# Patient Record
Sex: MALE | Race: WHITE | NOT HISPANIC OR LATINO | Employment: UNEMPLOYED | URBAN - METROPOLITAN AREA
[De-identification: names, ages, dates, MRNs, and addresses within clinical notes are randomized per-mention and may not be internally consistent; named-entity substitution may affect disease eponyms.]

---

## 2017-11-17 ENCOUNTER — APPOINTMENT (OUTPATIENT)
Dept: ADMISSIONS | Facility: MEDICAL CENTER | Age: 57
End: 2017-11-17
Attending: SURGERY
Payer: MEDICAID

## 2017-11-17 RX ORDER — ACETAMINOPHEN 500 MG
1000 TABLET ORAL EVERY 6 HOURS PRN
COMMUNITY

## 2017-11-17 RX ORDER — IBUPROFEN 200 MG
400 TABLET ORAL EVERY 4 HOURS PRN
COMMUNITY

## 2017-11-17 NOTE — OR NURSING
" Pre-admit appointment completed. \"Preparing for your procedure\" sheet given to pt along with verbal and written instructions. Pt instructed to continue regularly prescribed medications through the day before surgery. Pt instructed to take the following medications the day of surgery with a sip of water, per anesthesia protocol; tylenol.  "

## 2017-11-20 ENCOUNTER — HOSPITAL ENCOUNTER (OUTPATIENT)
Facility: MEDICAL CENTER | Age: 57
End: 2017-11-20
Attending: SURGERY | Admitting: SURGERY
Payer: MEDICAID

## 2017-11-20 VITALS
BODY MASS INDEX: 24.9 KG/M2 | HEART RATE: 62 BPM | TEMPERATURE: 97.3 F | DIASTOLIC BLOOD PRESSURE: 101 MMHG | RESPIRATION RATE: 18 BRPM | OXYGEN SATURATION: 96 % | SYSTOLIC BLOOD PRESSURE: 138 MMHG | HEIGHT: 74 IN | WEIGHT: 194 LBS

## 2017-11-20 PROCEDURE — 160047 HCHG PACU  - EA ADDL 30 MINS PHASE II: Performed by: SURGERY

## 2017-11-20 PROCEDURE — 502571 HCHG PACK, LAP CHOLE: Performed by: SURGERY

## 2017-11-20 PROCEDURE — 700105 HCHG RX REV CODE 258: Performed by: SURGERY

## 2017-11-20 PROCEDURE — 700111 HCHG RX REV CODE 636 W/ 250 OVERRIDE (IP)

## 2017-11-20 PROCEDURE — 501568 HCHG TROCAR, BLUNTPORT 12MM: Performed by: SURGERY

## 2017-11-20 PROCEDURE — 700101 HCHG RX REV CODE 250

## 2017-11-20 PROCEDURE — C1781 MESH (IMPLANTABLE): HCPCS | Performed by: SURGERY

## 2017-11-20 PROCEDURE — 160009 HCHG ANES TIME/MIN: Performed by: SURGERY

## 2017-11-20 PROCEDURE — 160002 HCHG RECOVERY MINUTES (STAT): Performed by: SURGERY

## 2017-11-20 PROCEDURE — 160048 HCHG OR STATISTICAL LEVEL 1-5: Performed by: SURGERY

## 2017-11-20 PROCEDURE — 501570 HCHG TROCAR, SEPARATOR: Performed by: SURGERY

## 2017-11-20 PROCEDURE — 160039 HCHG SURGERY MINUTES - EA ADDL 1 MIN LEVEL 3: Performed by: SURGERY

## 2017-11-20 PROCEDURE — 160046 HCHG PACU - 1ST 60 MINS PHASE II: Performed by: SURGERY

## 2017-11-20 PROCEDURE — 500048 HCHG BALLOON, TROCAR FOR HERNIA: Performed by: SURGERY

## 2017-11-20 PROCEDURE — 160025 RECOVERY II MINUTES (STATS): Performed by: SURGERY

## 2017-11-20 PROCEDURE — 160028 HCHG SURGERY MINUTES - 1ST 30 MINS LEVEL 3: Performed by: SURGERY

## 2017-11-20 PROCEDURE — 700102 HCHG RX REV CODE 250 W/ 637 OVERRIDE(OP)

## 2017-11-20 PROCEDURE — A9270 NON-COVERED ITEM OR SERVICE: HCPCS

## 2017-11-20 PROCEDURE — 501583 HCHG TROCAR, THRD CAN&SEAL 5X100: Performed by: SURGERY

## 2017-11-20 PROCEDURE — 502691 HCHG STAPLER ABSORBATACK FIXTN: Performed by: SURGERY

## 2017-11-20 PROCEDURE — 501838 HCHG SUTURE GENERAL: Performed by: SURGERY

## 2017-11-20 PROCEDURE — 160035 HCHG PACU - 1ST 60 MINS PHASE I: Performed by: SURGERY

## 2017-11-20 DEVICE — MESH PROGRIP LAPROSCOPIC SELF FIXATING (1/CA): Type: IMPLANTABLE DEVICE | Site: GROIN | Status: FUNCTIONAL

## 2017-11-20 RX ORDER — BUPIVACAINE HYDROCHLORIDE AND EPINEPHRINE 5; 5 MG/ML; UG/ML
INJECTION, SOLUTION EPIDURAL; INTRACAUDAL; PERINEURAL
Status: DISCONTINUED | OUTPATIENT
Start: 2017-11-20 | End: 2017-11-20 | Stop reason: HOSPADM

## 2017-11-20 RX ORDER — DOCUSATE SODIUM 100 MG/1
100 CAPSULE, LIQUID FILLED ORAL 2 TIMES DAILY
Qty: 30 CAP | Refills: 3 | Status: SHIPPED | OUTPATIENT
Start: 2017-11-20

## 2017-11-20 RX ORDER — LIDOCAINE HYDROCHLORIDE 10 MG/ML
INJECTION, SOLUTION INFILTRATION; PERINEURAL
Status: COMPLETED
Start: 2017-11-20 | End: 2017-11-20

## 2017-11-20 RX ORDER — CEFAZOLIN SODIUM 1 G/3ML
INJECTION, POWDER, FOR SOLUTION INTRAMUSCULAR; INTRAVENOUS
Status: DISCONTINUED | OUTPATIENT
Start: 2017-11-20 | End: 2017-11-20 | Stop reason: HOSPADM

## 2017-11-20 RX ORDER — SODIUM CHLORIDE, SODIUM LACTATE, POTASSIUM CHLORIDE, CALCIUM CHLORIDE 600; 310; 30; 20 MG/100ML; MG/100ML; MG/100ML; MG/100ML
INJECTION, SOLUTION INTRAVENOUS
Status: DISCONTINUED | OUTPATIENT
Start: 2017-11-20 | End: 2017-11-20 | Stop reason: HOSPADM

## 2017-11-20 RX ORDER — OXYCODONE HCL 5 MG/5 ML
SOLUTION, ORAL ORAL
Status: COMPLETED
Start: 2017-11-20 | End: 2017-11-20

## 2017-11-20 RX ORDER — HYDROMORPHONE HYDROCHLORIDE 2 MG/1
2-4 TABLET ORAL EVERY 4 HOURS PRN
Qty: 10 TAB | Refills: 0 | Status: SHIPPED | OUTPATIENT
Start: 2017-11-20

## 2017-11-20 RX ORDER — ONDANSETRON 4 MG/1
4 TABLET, FILM COATED ORAL EVERY 4 HOURS PRN
Qty: 20 TAB | Refills: 3 | Status: SHIPPED | OUTPATIENT
Start: 2017-11-20

## 2017-11-20 RX ADMIN — LIDOCAINE HYDROCHLORIDE 0.2 ML: 10 INJECTION, SOLUTION INFILTRATION; PERINEURAL at 14:00

## 2017-11-20 RX ADMIN — SODIUM CHLORIDE, POTASSIUM CHLORIDE, SODIUM LACTATE AND CALCIUM CHLORIDE: 600; 310; 30; 20 INJECTION, SOLUTION INTRAVENOUS at 14:13

## 2017-11-20 RX ADMIN — OXYCODONE HYDROCHLORIDE 10 MG: 5 SOLUTION ORAL at 17:11

## 2017-11-20 RX ADMIN — FENTANYL CITRATE 50 MCG: 50 INJECTION, SOLUTION INTRAMUSCULAR; INTRAVENOUS at 17:25

## 2017-11-20 RX ADMIN — FENTANYL CITRATE 50 MCG: 50 INJECTION, SOLUTION INTRAMUSCULAR; INTRAVENOUS at 17:09

## 2017-11-20 ASSESSMENT — PAIN SCALES - GENERAL
PAINLEVEL_OUTOF10: 0
PAINLEVEL_OUTOF10: 4
PAINLEVEL_OUTOF10: 0
PAINLEVEL_OUTOF10: 5
PAINLEVEL_OUTOF10: 4
PAINLEVEL_OUTOF10: 5

## 2017-11-20 NOTE — OR NURSING
Patient to preop, allergies and NPO status verified, home medications reconciled, belongings secured, verbalizes understanding of pain scale, surgical site verified, IV access established, SCDs in place.

## 2017-11-20 NOTE — PROGRESS NOTES
Discharge Instructions:  Inguinal Hernia Repair     1. DIET: After discharge from the hospital you may resume your normal preoperative diet without restrictions.    2. ACTIVITIES: After discharge from the hospital, you may resume full routine activities. Heavy lifting (over 15 pounds) and strenuous activities will make your incision sore and should be avoided for one month after surgery. Routine activities of daily living such as walking, going up and down stairs are acceptable.    3. DRIVING: You may drive whenever you are no longer taking narcotic pain medications and are able to perform the activities needed to drive safely, i.e. turning, bending, twisting, etc.    4. BATHING: OK to shower starting one day after surgery.  The incision is covered with skin glue which is waterproof.  It will start to peel off in 5-7 days which is normal.  Avoid submerging the incision in water (tub, bath, pool) for at least a week.     5. BOWEL FUNCTION: The combination of pain medication and decreased activity level can cause constipation in otherwise normal patients. If you feel this is occurring, take a laxative (Milk of Magnesia, Ex-Lax, Senokot, Miralax, Magnesium Citrate) until the problem has resolved.    6. PAIN MEDICATION: You will be given a prescription for pain medication at discharge. Please take these as directed. It is advisable to take your medication around the clock for the first 24 to 48 hours. You may continue any non-steroidal anti-inflammatory medications (NSAIDs) such as Advil in the post-operative period. These may and should be taken with your narcotic pain medication. It is important to remember not to take medications on an empty stomach as this may cause nausea. Do not consume alcohol while taking pain medication.  You can put ice packs on the groin(s) if it helps for additional pain relief.  Ice should be applied for 20 minutes at a time, with a 20 minute break before reapplying.    7. WHAT TO EXPECT:  You may develop swelling and bruising at the base of your genitalia and within the scrotum (males) or labias (females).  This is due to bleeding from the operative site tracking down into these areas.  The bleeding typically stops within a few hours after surgery.  The bruising may take several weeks to resolve.      8. CALL IF YOU HAVE: (1) Fevers to more than 101.5F, (2) Unusual chest or leg pain, (3) Drainage or fluid from incision that may be foul smelling, increased tenderness or soreness at the wound or the wound edges are no longer together, redness or swelling at the incision site.  (4) Profound swelling at the incision site or in the genitals.    9. FOLLOW-UP: Call and schedule a follow up appointment in about 2 weeks. Our office number is [phone]    If you have any additional questions at all, please do not hesitate to call the office and speak to my medical assistant, myself, or the physician on call.    Pradeep West MD  [facility]  [address] [place] [address], [address]  [place] [address]

## 2017-11-21 NOTE — OP REPORT
Operative Report     Date:    11/20/2017    Pre-operative Diagnosis:  Reducible bilateral inguinal hernias. Right side is recurrent    Post-operative Diagnosis: same     Procedure:   Laparoscopic Bilateral Inguinal Hernia Repair with Mesh    Surgeon:   Pradeep West M.D.     Assistant:    Stacy ADKINS    Anesthesia:   General plus local 0.5% Marcaine with epinephrine    Blood Loss:   Minimal    Specimen:   None    Findings:   small indirect hernias bilaterally      Laparoscopic Progrip mesh for the repair    Wound classification: Clean    Disposition:   PACU in stable condition  ---------------------------------------------------------    History:  57 y.o. male evaluated in the clinic and found to have a reducible bilateral inguinal hernias that were causing him frequent pain.  I had an extensive conversation with the patient regarding the recommendation for surgery.  I explained the details of the operation, alternatives, and potential risks, including but not limited to bleeding, infection, injury to vessels or nerves, injury to organs or intestines, and risks of anesthesia.  All questions were answered. They understand and agree to proceed.  Informed consent was obtained.    PROCEDURE:  The patient voluntarily voided their urinary bladder just prior to being brought back to the OR.  The patient was taken back to the operating room and placed in supine position.  General endotracheal anesthesia was administered and the patient was intubated. Intravenous antibiotics were administered by the anesthesiologist in correct time interval. Sequential compression devices were placed. All bony prominences were protected.  The abdomen was prepped and draped in a sterile fashion.  A time-out was performed and the patient and procedure were both verified.      Marcaine 0.5% with epinephrine was used to infiltrate all port sites. A 2cm transverse incision was made below the umbilicus.  The subcutaneous tissues were  spread bluntly to expose the fascia.  The anterior fascia was open sharply along the entire length of the incision to expose the right rectus muscular bundle.  This muscular bundle was swept laterally to expose the posterior fascia.  A 10mm dissecting balloon was then guided through the incision, along the pre-peritoneal space, and down to the pubic symphysis.  The balloon was then hand-pump inflated under direct visualization.  The balloon was deflated and removed.  A 12mm Centeno port was placed through the incision, the balloon was inflated, and the collar was cinched down to the skin level to secure the port.  Gas was applied to the port and insufflation was achieved.  A 10mm laparoscope was placed through the port.  There was no evidence of vascular injury or injury to the peritoneum.  Dissection by the dissecting balloon was adequate.  The peritoneum was noted to be very thin and there was a number of small holes in the peritoneum after the insufflation of the dissecting balloon.    Two 5mm ports were placed in the lower midline between the umbilicus and the pubic symphysis under direct visualization.  Dissection was begun on the right side.  The epigastric vascular bundle was identified in its usual location.  The loose areolar tissue was swept down laterally to free the peritoneum.  Dissection continued medially until the cord structures were identified.  The cord was carefully dissected free from surrounding attachments.  The vas deferens was identified and protected.  The femoral, direct, and indirect spaces were all examined and the peritoneum was swept down posteriorly.  The area of dissection was noted to be hemostatic. Pre-peritoneal fat was cleared off the pubic symphysis and Jeb's ligament on the right side.    At this point a laparoscopic progrip mesh was introduced into the right pre-peritoneal space through the Centeno port.  It was laid out flat and care was taken to cover the femoral, direct,  and indirect spaces entirely.  The mesh overlapped the midline by 1cm.    Dissection was performed in a similar fashion on the left side.    At this point a laparoscopic progrip mesh was introduced into the left pre-peritoneal space through the Centeno port.  It was laid out flat and care was taken to cover the femoral, direct, and indirect spaces entirely.  The mesh overlapped the midline by 1cm.    The gas was released slowly and the meshes appeared to lay in the pre-peritoneal space without wrinkles or folds.  The Centeno port was removed.  The gibson-umbilical port site fascia was closed with an 0 vicryl suture.  The fascia was noted to be tight and well approximated.  All remaining ports were then removed.  All skin incisions were closed with interrupted 4-0 Vicryl subcuticular sutures and dressed with skin glue.     The patient tolerated the procedure well and there were no immediate apparent complications. All sponge, sharps, and instrument counts were correct on 2 separate occasions. The patient was transferred to the PACU in stable condition.     Pradeep West MD  General and Vascular Surgery  [facility]  Cell: [phone]

## 2017-11-21 NOTE — OR NURSING
1653: To PACU from OR via gurney, respirations spontaneous and non-laboredIcepack applied over c/d/i midline abdominal surgical dressings.  1700: Sleepy, but arouses to voice. Pain is starting to increase, medications given per MAR.  1715: Pain is tolerable, no nausea. Talking on the phone with friend.  1730: Pain is not as tolerable, pain medication given per MAR,  no nausea, tolerating room air.  1745: Pain is tolerable, no nausea, tolerating room air. Meets criteria to transfer to Stage 2, report given bedside to JHONY Avila and she transported him to Stage 2.

## 2017-11-21 NOTE — OR NURSING
1822- Pt DC'd home to daughter via w/c to private vehicle.  VSS.  Pt stated pain tolerable, midline abd incisions x3 with surgical glue cdi. Pt and daughter verbalized understanding of DC instructions, script for dilaudid, zofran, and colace given to pt's daughter.

## 2017-11-21 NOTE — DISCHARGE INSTRUCTIONS
ACTIVITY: Rest and take it easy for the first 24 hours.  A responsible adult is recommended to remain with you during that time.  It is normal to feel sleepy.  We encourage you to not do anything that requires balance, judgment or coordination.    MILD FLU-LIKE SYMPTOMS ARE NORMAL. YOU MAY EXPERIENCE GENERALIZED MUSCLE ACHES, THROAT IRRITATION, HEADACHE AND/OR SOME NAUSEA.    FOR 24 HOURS DO NOT:  Drive, operate machinery or run household appliances.  Drink beer or alcoholic beverages.   Make important decisions or sign legal documents.    SPECIAL INSTRUCTIONS:     Discharge Instructions:  Inguinal Hernia Repair      1. DIET: After discharge from the hospital you may resume your normal preoperative diet without restrictions.     2. ACTIVITIES: After discharge from the hospital, you may resume full routine activities. Heavy lifting (over 15 pounds) and strenuous activities will make your incision sore and should be avoided for one month after surgery. Routine activities of daily living such as walking, going up and down stairs are acceptable.     3. DRIVING: You may drive whenever you are no longer taking narcotic pain medications and are able to perform the activities needed to drive safely, i.e. turning, bending, twisting, etc.     4. BATHING: OK to shower starting one day after surgery.  The incision is covered with skin glue which is waterproof.  It will start to peel off in 5-7 days which is normal.  Avoid submerging the incision in water (tub, bath, pool) for at least a week.      5. BOWEL FUNCTION: The combination of pain medication and decreased activity level can cause constipation in otherwise normal patients. If you feel this is occurring, take a laxative (Milk of Magnesia, Ex-Lax, Senokot, Miralax, Magnesium Citrate) until the problem has resolved.     6. PAIN MEDICATION: You will be given a prescription for pain medication at discharge. Please take these as directed. It is advisable to take your  medication around the clock for the first 24 to 48 hours. You may continue any non-steroidal anti-inflammatory medications (NSAIDs) such as Advil in the post-operative period. These may and should be taken with your narcotic pain medication. It is important to remember not to take medications on an empty stomach as this may cause nausea. Do not consume alcohol while taking pain medication.  You can put ice packs on the groin(s) if it helps for additional pain relief.  Ice should be applied for 20 minutes at a time, with a 20 minute break before reapplying.     7. WHAT TO EXPECT: You may develop swelling and bruising at the base of your genitalia and within the scrotum (males) or labias (females).  This is due to bleeding from the operative site tracking down into these areas.  The bleeding typically stops within a few hours after surgery.  The bruising may take several weeks to resolve.       8. CALL IF YOU HAVE: (1) Fevers to more than 101.5F, (2) Unusual chest or leg pain, (3) Drainage or fluid from incision that may be foul smelling, increased tenderness or soreness at the wound or the wound edges are no longer together, redness or swelling at the incision site.  (4) Profound swelling at the incision site or in the genitals.     9. FOLLOW-UP: Call and schedule a follow up appointment in about 2 weeks. Our office number is [phone]     If you have any additional questions at all, please do not hesitate to call the office and speak to my medical assistant, myself, or the physician on call.     Pradeep West MD  [facility]  [address] [place] [address], [address]  [place] [address]       DIET: To avoid nausea, slowly advance diet as tolerated, avoiding spicy or greasy foods for the first day.  Add more substantial food to your diet according to your physician's instructions. INCREASE FLUIDS AND FIBER TO AVOID CONSTIPATION.      FOLLOW-UP APPOINTMENT:  A follow-up appointment should be arranged with your doctor  in; call to schedule.    You should CALL YOUR PHYSICIAN if you develop:  Fever greater than 101 degrees F.  Pain not relieved by medication, or persistent nausea or vomiting.  Excessive bleeding (blood soaking through dressing) or unexpected drainage from the wound.  Extreme redness or swelling around the incision site, drainage of pus or foul smelling drainage.  Inability to urinate or empty your bladder within 8 hours.  Problems with breathing or chest pain.    You should call 911 if you develop problems with breathing or chest pain.  If you are unable to contact your doctor or surgical center, you should go to the nearest emergency room or urgent care center.     Dr West's telephone #: [phone]    If any questions arise, call your doctor.  If your doctor is not available, please feel free to call the Surgical Center at [phone].  The Center is open Monday through Friday from 7AM to 7PM.  You can also call the [facility] HOTLINE open 24 hours/day, 7 days/week and speak to a nurse at [phone], or toll free at [phone].    A registered nurse may call you a few days after your surgery to see how you are doing after your procedure.    MEDICATIONS: Resume taking daily medication.  Take prescribed pain medication with food.  If no medication is prescribed, you may take non-aspirin pain medication if needed.  PAIN MEDICATION CAN BE VERY CONSTIPATING.  Take a stool softener or laxative such as senokot, pericolace, or milk of magnesia if needed.    Prescription given for Dilaudid and Colace.  Last pain medication given at 5:11 PM.    If your physician has prescribed pain medication that includes Acetaminophen (Tylenol), do not take additional Acetaminophen (Tylenol) while taking the prescribed medication.    Depression / Suicide Risk    As you are discharged from this [facility] facility, it is important to learn how to keep safe from harming yourself.    Recognize the warning signs:  · Abrupt changes in  personality, positive or negative- including increase in energy   · Giving away possessions  · Change in eating patterns- significant weight changes-  positive or negative  · Change in sleeping patterns- unable to sleep or sleeping all the time   · Unwillingness or inability to communicate  · Depression  · Unusual sadness, discouragement and loneliness  · Talk of wanting to die  · Neglect of personal appearance   · Rebelliousness- reckless behavior  · Withdrawal from people/activities they love  · Confusion- inability to concentrate     If you or a loved one observes any of these behaviors or has concerns about self-harm, here's what you can do:  · Talk about it- your feelings and reasons for harming yourself  · Remove any means that you might use to hurt yourself (examples: pills, rope, extension cords, firearm)  · Get professional help from the community (Mental Health, Substance Abuse, psychological counseling)  · Do not be alone:Call your Safe Contact- someone whom you trust who will be there for you.  · Call your local CRISIS HOTLINE 299-0039 or 918-649-8798  · Call your local Children's Mobile Crisis Response Team [place] (993) 950-9514 or www.[facility]  · Call the toll free National Suicide Prevention Hotlines   · National Suicide Prevention Lifeline 627-181-UCSP (8019)  · National Hope Line Network 800-SUICIDE (272-3488)

## 2020-09-18 ENCOUNTER — APPOINTMENT (OUTPATIENT)
Dept: RADIOLOGY | Facility: MEDICAL CENTER | Age: 60
End: 2020-09-18
Attending: EMERGENCY MEDICINE
Payer: MEDICAID

## 2020-09-18 ENCOUNTER — HOSPITAL ENCOUNTER (EMERGENCY)
Facility: MEDICAL CENTER | Age: 60
End: 2020-09-18
Attending: EMERGENCY MEDICINE | Admitting: EMERGENCY MEDICINE
Payer: MEDICAID

## 2020-09-18 VITALS
WEIGHT: 185 LBS | SYSTOLIC BLOOD PRESSURE: 134 MMHG | TEMPERATURE: 98.8 F | BODY MASS INDEX: 23.74 KG/M2 | DIASTOLIC BLOOD PRESSURE: 86 MMHG | HEIGHT: 74 IN | RESPIRATION RATE: 17 BRPM | HEART RATE: 84 BPM | OXYGEN SATURATION: 93 %

## 2020-09-18 DIAGNOSIS — L03.119 CELLULITIS OF LOWER EXTREMITY, UNSPECIFIED LATERALITY: ICD-10-CM

## 2020-09-18 DIAGNOSIS — S93.402A SPRAIN OF LEFT ANKLE, UNSPECIFIED LIGAMENT, INITIAL ENCOUNTER: ICD-10-CM
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
## 2020-09-18 DIAGNOSIS — T07.XXXA ABRASIONS OF MULTIPLE SITES: ICD-10-CM

## 2020-09-18 PROCEDURE — 99284 EMERGENCY DEPT VISIT MOD MDM: CPT

## 2020-09-18 PROCEDURE — 73610 X-RAY EXAM OF ANKLE: CPT | Mod: LT

## 2020-09-18 PROCEDURE — 700102 HCHG RX REV CODE 250 W/ 637 OVERRIDE(OP): Performed by: EMERGENCY MEDICINE

## 2020-09-18 PROCEDURE — A9270 NON-COVERED ITEM OR SERVICE: HCPCS | Performed by: EMERGENCY MEDICINE

## 2020-09-18 PROCEDURE — 304217 HCHG IRRIGATION SYSTEM

## 2020-09-18 RX ORDER — CEPHALEXIN 500 MG/1
500 CAPSULE ORAL 3 TIMES DAILY
Qty: 21 CAP | Refills: 0 | Status: SHIPPED | OUTPATIENT
Start: 2020-09-18 | End: 2020-09-25

## 2020-09-18 RX ORDER — ACETAMINOPHEN 325 MG/1
650 TABLET ORAL ONCE
Status: COMPLETED | OUTPATIENT
Start: 2020-09-18 | End: 2020-09-18

## 2020-09-18 RX ADMIN — ACETAMINOPHEN 650 MG: 325 TABLET, FILM COATED ORAL at 07:42

## 2020-09-18 NOTE — ED NOTES
Air splint to left ankle. Pt ambulating with steady gait. Discharged to self. Pt understands to take prescription as prescribed.  from pharmacy.
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
## 2022-06-08 ENCOUNTER — HOSPITAL ENCOUNTER (EMERGENCY)
Facility: MEDICAL CENTER | Age: 62
End: 2022-06-08
Attending: EMERGENCY MEDICINE
Payer: MEDICAID

## 2022-06-08 ENCOUNTER — APPOINTMENT (OUTPATIENT)
Dept: RADIOLOGY | Facility: MEDICAL CENTER | Age: 62
End: 2022-06-08
Attending: EMERGENCY MEDICINE
Payer: MEDICAID

## 2022-06-08 VITALS
HEIGHT: 74 IN | OXYGEN SATURATION: 93 % | BODY MASS INDEX: 22.89 KG/M2 | TEMPERATURE: 98.5 F | SYSTOLIC BLOOD PRESSURE: 141 MMHG | DIASTOLIC BLOOD PRESSURE: 92 MMHG | RESPIRATION RATE: 18 BRPM | WEIGHT: 178.35 LBS | HEART RATE: 69 BPM

## 2022-06-08 DIAGNOSIS — J10.1 INFLUENZA A: ICD-10-CM

## 2022-06-08 LAB
ALBUMIN SERPL BCP-MCNC: 4 G/DL (ref 3.2–4.9)
ALBUMIN/GLOB SERPL: 1.2 G/DL
ALP SERPL-CCNC: 98 U/L (ref 30–99)
ALT SERPL-CCNC: 17 U/L (ref 2–50)
ANION GAP SERPL CALC-SCNC: 12 MMOL/L (ref 7–16)
AST SERPL-CCNC: 25 U/L (ref 12–45)
BASOPHILS # BLD AUTO: 0.7 % (ref 0–1.8)
BASOPHILS # BLD: 0.05 K/UL (ref 0–0.12)
BILIRUB SERPL-MCNC: 0.5 MG/DL (ref 0.1–1.5)
BUN SERPL-MCNC: 10 MG/DL (ref 8–22)
CALCIUM SERPL-MCNC: 8.7 MG/DL (ref 8.5–10.5)
CHLORIDE SERPL-SCNC: 99 MMOL/L (ref 96–112)
CO2 SERPL-SCNC: 20 MMOL/L (ref 20–33)
CREAT SERPL-MCNC: 0.58 MG/DL (ref 0.5–1.4)
EOSINOPHIL # BLD AUTO: 0.02 K/UL (ref 0–0.51)
EOSINOPHIL NFR BLD: 0.3 % (ref 0–6.9)
ERYTHROCYTE [DISTWIDTH] IN BLOOD BY AUTOMATED COUNT: 41.1 FL (ref 35.9–50)
FLUAV RNA SPEC QL NAA+PROBE: POSITIVE
FLUBV RNA SPEC QL NAA+PROBE: NEGATIVE
GFR SERPLBLD CREATININE-BSD FMLA CKD-EPI: 112 ML/MIN/1.73 M 2
GLOBULIN SER CALC-MCNC: 3.4 G/DL (ref 1.9–3.5)
GLUCOSE SERPL-MCNC: 104 MG/DL (ref 65–99)
HCT VFR BLD AUTO: 39.7 % (ref 42–52)
HGB BLD-MCNC: 13.3 G/DL (ref 14–18)
IMM GRANULOCYTES # BLD AUTO: 0.05 K/UL (ref 0–0.11)
IMM GRANULOCYTES NFR BLD AUTO: 0.7 % (ref 0–0.9)
LYMPHOCYTES # BLD AUTO: 0.42 K/UL (ref 1–4.8)
LYMPHOCYTES NFR BLD: 5.5 % (ref 22–41)
MCH RBC QN AUTO: 26.8 PG (ref 27–33)
MCHC RBC AUTO-ENTMCNC: 33.5 G/DL (ref 33.7–35.3)
MCV RBC AUTO: 79.9 FL (ref 81.4–97.8)
MONOCYTES # BLD AUTO: 0.83 K/UL (ref 0–0.85)
MONOCYTES NFR BLD AUTO: 10.9 % (ref 0–13.4)
NEUTROPHILS # BLD AUTO: 6.27 K/UL (ref 1.82–7.42)
NEUTROPHILS NFR BLD: 81.9 % (ref 44–72)
NRBC # BLD AUTO: 0 K/UL
NRBC BLD-RTO: 0 /100 WBC
PLATELET # BLD AUTO: 320 K/UL (ref 164–446)
PMV BLD AUTO: 8.5 FL (ref 9–12.9)
POTASSIUM SERPL-SCNC: 3.8 MMOL/L (ref 3.6–5.5)
PROT SERPL-MCNC: 7.4 G/DL (ref 6–8.2)
RBC # BLD AUTO: 4.97 M/UL (ref 4.7–6.1)
RSV RNA SPEC QL NAA+PROBE: NEGATIVE
SARS-COV-2 RNA RESP QL NAA+PROBE: NOTDETECTED
SODIUM SERPL-SCNC: 131 MMOL/L (ref 135–145)
SPECIMEN SOURCE: ABNORMAL
WBC # BLD AUTO: 7.6 K/UL (ref 4.8–10.8)

## 2022-06-08 PROCEDURE — 36415 COLL VENOUS BLD VENIPUNCTURE: CPT

## 2022-06-08 PROCEDURE — 80053 COMPREHEN METABOLIC PANEL: CPT

## 2022-06-08 PROCEDURE — A9270 NON-COVERED ITEM OR SERVICE: HCPCS | Performed by: EMERGENCY MEDICINE

## 2022-06-08 PROCEDURE — 700102 HCHG RX REV CODE 250 W/ 637 OVERRIDE(OP): Performed by: EMERGENCY MEDICINE

## 2022-06-08 PROCEDURE — 71045 X-RAY EXAM CHEST 1 VIEW: CPT

## 2022-06-08 PROCEDURE — 700111 HCHG RX REV CODE 636 W/ 250 OVERRIDE (IP): Performed by: EMERGENCY MEDICINE

## 2022-06-08 PROCEDURE — 0241U HCHG SARS-COV-2 COVID-19 NFCT DS RESP RNA 4 TRGT MIC: CPT

## 2022-06-08 PROCEDURE — 96372 THER/PROPH/DIAG INJ SC/IM: CPT

## 2022-06-08 PROCEDURE — 85025 COMPLETE CBC W/AUTO DIFF WBC: CPT

## 2022-06-08 PROCEDURE — C9803 HOPD COVID-19 SPEC COLLECT: HCPCS | Performed by: EMERGENCY MEDICINE

## 2022-06-08 PROCEDURE — 99283 EMERGENCY DEPT VISIT LOW MDM: CPT

## 2022-06-08 RX ORDER — KETOROLAC TROMETHAMINE 30 MG/ML
30 INJECTION, SOLUTION INTRAMUSCULAR; INTRAVENOUS ONCE
Status: COMPLETED | OUTPATIENT
Start: 2022-06-08 | End: 2022-06-08

## 2022-06-08 RX ORDER — OSELTAMIVIR PHOSPHATE 75 MG/1
75 CAPSULE ORAL 2 TIMES DAILY
Qty: 10 CAPSULE | Refills: 0 | Status: SHIPPED | OUTPATIENT
Start: 2022-06-08

## 2022-06-08 RX ORDER — ACETAMINOPHEN 325 MG/1
650 TABLET ORAL ONCE
Status: COMPLETED | OUTPATIENT
Start: 2022-06-08 | End: 2022-06-08

## 2022-06-08 RX ADMIN — ACETAMINOPHEN 650 MG: 325 TABLET ORAL at 11:07

## 2022-06-08 RX ADMIN — KETOROLAC TROMETHAMINE 30 MG: 30 INJECTION, SOLUTION INTRAMUSCULAR at 11:10

## 2022-06-08 ASSESSMENT — PAIN DESCRIPTION - PAIN TYPE: TYPE: ACUTE PAIN

## 2022-06-08 NOTE — ED NOTES
Patient ambulated to Ortho 1 without incident. Patient changed into gown and oriented to care area. Primary assessment complete. Chart up for ERP.

## 2022-06-08 NOTE — ED TRIAGE NOTES
"Chief Complaint   Patient presents with   • Generalized Body Aches     For a few days with associated cough. Denies SOB       Patient to triage ambulatory with a steady gait, AAOx4, Appropriate precautions in place.     Explained wait time and triage process. Placed back in lobby. Told to notify ED tech or RN of any changes, verbalized understanding.    BP (!) 134/98   Pulse 93   Temp 36.3 °C (97.4 °F) (Temporal)   Resp 20   Ht 1.88 m (6' 2\")   Wt 80.9 kg (178 lb 5.6 oz)   SpO2 97%   BMI 22.90 kg/m²     "

## 2022-06-08 NOTE — DISCHARGE INSTRUCTIONS
Continue Tylenol or ibuprofen for your body aches and fever.  You have the flu and will take a few days to get better.  Rest and drink plenty of fluids.  I hope you get better soon.

## 2022-06-08 NOTE — ED PROVIDER NOTES
ED Provider Note    Scribed for Basilia Joseph M.D. by Oumar Shipman. 6/8/2022, 10:17 AM.    Primary care provider: Pcp Pt States None  Means of arrival: Private Vehicle  History obtained from: Patient  History limited by: None    CHIEF COMPLAINT  Chief Complaint   Patient presents with    Generalized Body Aches     For a few days with associated cough. Denies SOB     HPI  Donovan Jerrell Vail is a 62 y.o. male who presents to the Emergency Department with moderate body aches for a couple of days. He reports associated symptoms of cough, congestion and headache, but denies vomiting, diarrhea, neck pain, sore throat, dysuria, chest pain, swelling or rash. No alleviating or exacerbating factors reported. He is vaccinated for COVID-19. He denies history of diabetes or pulmonary history.     REVIEW OF SYSTEMS  Pertinent positives include body aches, cough, congestion, headache.   Pertinent negatives include no vomiting, diarrhea, neck pain, sore throat, dysuria, chest pain, swelling and rash.    All other system negative     PAST MEDICAL HISTORY   has a past medical history of Dental disorder, Marijuana use (11/17/2017), and Pain (11/17/2017).    SURGICAL HISTORY   has a past surgical history that includes split thickness skin graft (1/21/2015); other surgical procedure (Right, 2013); other surgical procedure (Right, 2014); nerve ulnar transfer (Right, 03/2017); other surgical procedure (Right, 2234-2339); inguinal hernia repair (Right, 2005); and inguinal hernia laparoscopic bilateral (Bilateral, 11/20/2017).    SOCIAL HISTORY  Social History     Tobacco Use    Smoking status: Never Smoker    Smokeless tobacco: Current User     Types: Snuff, Chew   Substance Use Topics    Alcohol use: No    Drug use: Yes     Types: Inhaled     Comment: marijuana 3-4 times per week      Social History     Substance and Sexual Activity   Drug Use Yes    Types: Inhaled    Comment: marijuana 3-4 times per week       FAMILY HISTORY  Not  "pertinent    CURRENT MEDICATIONS  Home Medications       Reviewed by Shirlene Nunn R.N. (Registered Nurse) on 06/08/22 at 1006  Med List Status: Partial     Medication Last Dose Status   acetaminophen (TYLENOL) 500 MG Tab  Active   docusate sodium (COLACE) 100 MG Cap  Active   HYDROmorphone (DILAUDID) 2 MG Tab  Active   ibuprofen (MOTRIN) 200 MG Tab  Active   ondansetron (ZOFRAN) 4 MG Tab tablet  Active                    ALLERGIES  Allergies   Allergen Reactions    Percocet [Oxycodone-Acetaminophen] Vomiting and Nausea     \"it makes me VERY sick\"    Vicodin [Hydrocodone-Acetaminophen] Vomiting and Nausea     \"it makes me VERY sick\"       PHYSICAL EXAM  VITAL SIGNS: BP (!) 134/98   Pulse 93   Temp 36.3 °C (97.4 °F) (Temporal)   Resp 20   Ht 1.88 m (6' 2\")   Wt 80.9 kg (178 lb 5.6 oz)   SpO2 97%   BMI 22.90 kg/m²     Constitutional:  Alert in no apparent distress. Looks uncomfortable   HENT: Normocephalic, Bilateral external ears normal. Nose normal.   Neck: no meningeal signs   Eyes: Pupils are equal and reactive. Conjunctiva normal, non-icteric.   Cardiovascular: Regular rhythm, Regular rate  Thorax & Lungs: Easy unlabored respirations, clear to ausculation throughout   Abdomen:  No gross signs of peritonitis, no pain with movement   Skin: Visualized skin is  Dry, No erythema, No rash.   Extremities:   No edema, No asymmetry  Neurologic: Alert, Grossly non-focal.   Psychiatric: Affect and Mood normal    DIAGNOSTIC STUDIES / PROCEDURES    LABS  Results for orders placed or performed during the hospital encounter of 06/08/22   CoV-2, FLU A/B, and RSV by PCR (2-4 Hours CEPHEID) : Collect NP swab in VTM    Specimen: Nasopharyngeal; Respirate   Result Value Ref Range    Influenza virus A RNA POSITIVE (A) Negative    Influenza virus B, PCR Negative Negative    RSV, PCR Negative Negative    SARS-CoV-2 by PCR NotDetected     SARS-CoV-2 Source NP Swab    CBC WITH DIFFERENTIAL   Result Value Ref Range    WBC 7.6 4.8 " - 10.8 K/uL    RBC 4.97 4.70 - 6.10 M/uL    Hemoglobin 13.3 (L) 14.0 - 18.0 g/dL    Hematocrit 39.7 (L) 42.0 - 52.0 %    MCV 79.9 (L) 81.4 - 97.8 fL    MCH 26.8 (L) 27.0 - 33.0 pg    MCHC 33.5 (L) 33.7 - 35.3 g/dL    RDW 41.1 35.9 - 50.0 fL    Platelet Count 320 164 - 446 K/uL    MPV 8.5 (L) 9.0 - 12.9 fL    Neutrophils-Polys 81.90 (H) 44.00 - 72.00 %    Lymphocytes 5.50 (L) 22.00 - 41.00 %    Monocytes 10.90 0.00 - 13.40 %    Eosinophils 0.30 0.00 - 6.90 %    Basophils 0.70 0.00 - 1.80 %    Immature Granulocytes 0.70 0.00 - 0.90 %    Nucleated RBC 0.00 /100 WBC    Neutrophils (Absolute) 6.27 1.82 - 7.42 K/uL    Lymphs (Absolute) 0.42 (L) 1.00 - 4.80 K/uL    Monos (Absolute) 0.83 0.00 - 0.85 K/uL    Eos (Absolute) 0.02 0.00 - 0.51 K/uL    Baso (Absolute) 0.05 0.00 - 0.12 K/uL    Immature Granulocytes (abs) 0.05 0.00 - 0.11 K/uL    NRBC (Absolute) 0.00 K/uL   CMP   Result Value Ref Range    Sodium 131 (L) 135 - 145 mmol/L    Potassium 3.8 3.6 - 5.5 mmol/L    Chloride 99 96 - 112 mmol/L    Co2 20 20 - 33 mmol/L    Anion Gap 12.0 7.0 - 16.0    Glucose 104 (H) 65 - 99 mg/dL    Bun 10 8 - 22 mg/dL    Creatinine 0.58 0.50 - 1.40 mg/dL    Calcium 8.7 8.5 - 10.5 mg/dL    AST(SGOT) 25 12 - 45 U/L    ALT(SGPT) 17 2 - 50 U/L    Alkaline Phosphatase 98 30 - 99 U/L    Total Bilirubin 0.5 0.1 - 1.5 mg/dL    Albumin 4.0 3.2 - 4.9 g/dL    Total Protein 7.4 6.0 - 8.2 g/dL    Globulin 3.4 1.9 - 3.5 g/dL    A-G Ratio 1.2 g/dL   ESTIMATED GFR   Result Value Ref Range    GFR (CKD-EPI) 112 >60 mL/min/1.73 m 2      All labs reviewed by me.    RADIOLOGY  DX-CHEST-PORTABLE (1 VIEW)   Final Result         1. No acute cardiopulmonary abnormalities are identified.        The radiologist's interpretation of all radiological studies have been reviewed by me.    COURSE & MEDICAL DECISION MAKING  Nursing notes, VS, PMSFHx reviewed in chart.    Patient presents emergency department with a dry cough and generalized body aches.  Patient is not  hypoxic or any acute respiratory distress.  Does not complain of any difficulty breathing.  He has a mild headache but no meningeal signs.  He looks well-hydrated.  No vomiting or diarrhea.    10:17 AM - Patient seen and examined at bedside with body aches and the differential diagnosis include but are not limited to Viral Illness, COVID, Pneumonia, Electrolyte Abnormality. Patient will be treated with Toradol 30 mg injection, Tylenol 650 mg tablet. I informed the patient of my plan to run diagnostic studies to evaluate their symptoms including imaging and labs. Patient verbalizes understanding and support with my plan of care. Ordered DX- chest, CBC with diff, CMP, CoV-2, Flu A/B, and RSV by  PCR to evaluate his symptoms.     Patient has a normal white count.  Sodium was 131.  Otherwise no significant electrolyte abnormalities.  No significant anemia.  Chest x-ray was normal.  Patient feels slightly better after the medications given.  COVID test was negative but patient is influenza positive which is consistent with his history and exam.  Symptoms only started yesterday and therefore will write for Tamiflu.    2:40 PM - I reevaluated the patient at bedside. I discussed the patient's diagnostic study results as shown above, which shows he tested positive for Influenza. I discussed plan for discharge and follow up as outlined below. He was prescribed Tamiflu. The patient is stable for discharge at this time and will return for any new or worsening symptoms. Patient verbalizes understanding and support with my plan for discharge.      The patient is referred to a primary physician for blood pressure management, diabetic screening, and for all other preventative health concerns.    DISPOSITION:  Patient will be discharged home in stable condition.    FOLLOW UP:  [facility], Emergency Dept  [address] [place] [id number] [phone]    If symptoms worsen, return to the er.    OUTPATIENT  MEDICATIONS:  New Prescriptions    OSELTAMIVIR (TAMIFLU) 75 MG CAP    Take 1 Capsule by mouth 2 times a day.     FINAL IMPRESSION  1. Influenza A          IOumar (Scribe), am scribing for, and in the presence of, Basilia Joseph M.D..    Electronically signed by: Oumar Shipman (Lilliane), 6/8/2022    Basilia NYE M.D. personally performed the services described in this documentation, as scribed by Oumar Shipman in my presence, and it is both accurate and complete.    The note accurately reflects work and decisions made by me.  Basilia Joseph M.D.  6/8/2022  3:47 PM

## 2022-06-08 NOTE — ED NOTES
PT now resting in bed with equal rise and fall of chest. In last hour PT bed sheets changed. PT given urinal at bedside.

## 2023-10-03 ENCOUNTER — HOSPITAL ENCOUNTER (EMERGENCY)
Facility: MEDICAL CENTER | Age: 63
End: 2023-10-03
Attending: STUDENT IN AN ORGANIZED HEALTH CARE EDUCATION/TRAINING PROGRAM
Payer: MEDICAID

## 2023-10-03 VITALS
BODY MASS INDEX: 22.52 KG/M2 | RESPIRATION RATE: 17 BRPM | TEMPERATURE: 98.6 F | HEART RATE: 53 BPM | HEIGHT: 74 IN | OXYGEN SATURATION: 98 % | WEIGHT: 175.49 LBS | DIASTOLIC BLOOD PRESSURE: 90 MMHG | SYSTOLIC BLOOD PRESSURE: 152 MMHG

## 2023-10-03 DIAGNOSIS — S61.412A LACERATION OF LEFT PALM, INITIAL ENCOUNTER: ICD-10-CM

## 2023-10-03 DIAGNOSIS — R29.898 THUMB WEAKNESS: ICD-10-CM

## 2023-10-03 PROCEDURE — 304999 HCHG REPAIR-SIMPLE/INTERMED LEVEL 1

## 2023-10-03 PROCEDURE — 99283 EMERGENCY DEPT VISIT LOW MDM: CPT

## 2023-10-03 PROCEDURE — 303747 HCHG EXTRA SUTURE

## 2023-10-03 PROCEDURE — 304217 HCHG IRRIGATION SYSTEM

## 2023-10-03 PROCEDURE — 700111 HCHG RX REV CODE 636 W/ 250 OVERRIDE (IP): Mod: UD | Performed by: STUDENT IN AN ORGANIZED HEALTH CARE EDUCATION/TRAINING PROGRAM

## 2023-10-03 RX ORDER — CEPHALEXIN 500 MG/1
500 CAPSULE ORAL 2 TIMES DAILY
Qty: 6 CAPSULE | Refills: 0 | Status: ACTIVE | OUTPATIENT
Start: 2023-10-03 | End: 2023-10-06

## 2023-10-03 RX ORDER — BUPIVACAINE HYDROCHLORIDE 2.5 MG/ML
20 INJECTION, SOLUTION EPIDURAL; INFILTRATION; INTRACAUDAL ONCE
Status: COMPLETED | OUTPATIENT
Start: 2023-10-03 | End: 2023-10-03

## 2023-10-03 RX ORDER — CEPHALEXIN 500 MG/1
500 CAPSULE ORAL 2 TIMES DAILY
Qty: 6 CAPSULE | Refills: 0 | Status: ACTIVE | OUTPATIENT
Start: 2023-10-03 | End: 2023-10-03 | Stop reason: SDUPTHER

## 2023-10-03 RX ADMIN — BUPIVACAINE HYDROCHLORIDE 20 ML: 2.5 INJECTION, SOLUTION EPIDURAL; INFILTRATION; INTRACAUDAL; PERINEURAL at 18:42

## 2023-10-03 ASSESSMENT — FIBROSIS 4 INDEX: FIB4 SCORE: 0.89

## 2023-10-03 NOTE — ED TRIAGE NOTES
Donovan Jerrell Yared  63 y.o. male  Chief Complaint   Patient presents with    Hand Laceration     Pt states he caught the inside of his left palm with a hooked razor blade. Entire t-shirt soaked with blood that pt brought in for blood control.        There were no vitals filed for this visit.    Patient educated on triage process and encouraged to alert staff of any changes in condition.    While switching to pressure bandage, RN observed the hand, which is fairly filleted open. Pressure dressing in place, but is already bleeding through. Pt able to wiggle fingers, sensation intact. Last tetanus vaccine last year.

## 2023-10-04 NOTE — ED NOTES
Bedside report received from Mary BOOKER. Fall precautions in place. No supplemental O2 use at this time

## 2023-10-04 NOTE — ED NOTES
ERP at bedside for lac repair    cpap night time  nasal pillows for CPAP use  sleep hygiene discussed  weight loss discussed

## 2023-10-04 NOTE — ED NOTES
Pt back in waiting room for bleeding from lac repair site. Area cleaned, bleeding controlled, dressing applied. Pt brought back to same bed, Blue 21H. ERP made aware.

## 2023-10-04 NOTE — ED PROVIDER NOTES
ED Provider Note    CHIEF COMPLAINT  Chief Complaint   Patient presents with    Hand Laceration     Pt states he caught the inside of his left palm with a hooked razor blade. Entire t-shirt soaked with blood that pt brought in for blood control.        EXTERNAL RECORDS REVIEWED  External ED Note [facility] visit on 11/15/2022 for suture removal    HPI/ROS  LIMITATION TO HISTORY   Select: : None  OUTSIDE HISTORIAN(S):      Donovan Vail is a 63 y.o. male who presents with a wound to the left palm.  Patient reports that he was working with a hooked razor blade and it slipped on rubber that he was working on and it hit him in the hand.  Patient reports tetanus is up-to-date.  Patient denies weakness or numbness in the hand distally.  Patient denies lightheadedness or syncope.    PAST MEDICAL HISTORY   has a past medical history of Dental disorder, Marijuana use (11/17/2017), and Pain (11/17/2017).    SURGICAL HISTORY   has a past surgical history that includes split thickness skin graft (1/21/2015); other surgical procedure (Right, 2013); other surgical procedure (Right, 2014); nerve ulnar transfer (Right, 03/2017); other surgical procedure (Right, 9098-4605); inguinal hernia repair (Right, 2005); and inguinal hernia laparoscopic bilateral (Bilateral, 11/20/2017).    FAMILY HISTORY  History reviewed. No pertinent family history.    SOCIAL HISTORY  Social History     Tobacco Use    Smoking status: Every Day     Current packs/day: 0.50     Types: Cigarettes    Smokeless tobacco: Current     Types: Snuff, Chew   Vaping Use    Vaping Use: Never used   Substance and Sexual Activity    Alcohol use: No    Drug use: Yes     Types: Inhaled     Comment: marijuana 3-4 times per week    Sexual activity: Not on file       CURRENT MEDICATIONS  Home Medications       Reviewed by Carina Joshi R.N. (Registered Nurse) on 10/03/23 at 2153  Med List Status: Partial     Medication Last Dose Status   acetaminophen  "(TYLENOL) 500 MG Tab  Active   docusate sodium (COLACE) 100 MG Cap  Active   HYDROmorphone (DILAUDID) 2 MG Tab  Active   ibuprofen (MOTRIN) 200 MG Tab  Active   ondansetron (ZOFRAN) 4 MG Tab tablet  Active   oseltamivir (TAMIFLU) 75 MG Cap  Active                    ALLERGIES  Allergies   Allergen Reactions    Percocet [Oxycodone-Acetaminophen] Vomiting and Nausea     \"it makes me VERY sick\"    Vicodin [Hydrocodone-Acetaminophen] Vomiting and Nausea     \"it makes me VERY sick\"       PHYSICAL EXAM  VITAL SIGNS: BP (!) 152/90   Pulse (!) 53   Temp 37 °C (98.6 °F) (Temporal)   Resp 17   Ht 1.88 m (6' 2\")   Wt 79.6 kg (175 lb 7.8 oz)   SpO2 98%   BMI 22.53 kg/m²    Vitals and nursing note reviewed.   Constitutional:       Comments: Patient is lying in bed supine, pleasant, conversant, speaking in complete sentences   HENT:      Head: Normocephalic and atraumatic.   Eyes:      Extraocular Movements: Extraocular movements intact.      Conjunctiva/sclera: Conjunctivae normal.      Pupils: Pupils are equal, round, and reactive to light.   Cardiovascular:      Pulses: Normal pulses.      Comments: HR 75  Pulmonary:      Effort: Pulmonary effort is normal. No respiratory distress.   Musculoskeletal:      Full-thickness 5 cm laceration to the thenar eminence of the left hand.  Opposition and abduction of the left thumb are limited due to wound.  Sensation at the median/radial/ulnar nerve distributions within normal limits.  Cap refill less than 2 seconds to the tips of the distal fingers including the thumb.     Cervical back: Normal range of motion. No rigidity.   Skin:     General: Skin is warm and dry.      Capillary Refill: Capillary refill takes less than 2 seconds.   Neurological:      Mental Status: Alert.           COURSE & MEDICAL DECISION MAKING    INITIAL ASSESSMENT, COURSE AND PLAN  Care Narrative: No evidence of neurovascular compromise on exam but I am concerned about tendon rupture given lack of " opposition and abduction.  Patient's wound will be irrigated and orthopedic surgery was consulted prior to repair.  Disposition pending orthopedic surgery consultation.    Electronically signed by: Brendan Joseph M.D., 10/3/2023 5:57 PM    Laceration repaired without complication.  Patient was consulted with Dr. Perry of orthopedic surgery who recommends outpatient follow-up due to concern for tendon rupture since patient's range of motion is limited in his hand.  Patient given short course of antibiotics due to dirty wound.  Disposition Home with outpatient orthopedic surgery follow-up.  Patient counseled to return to the emergency department should his symptoms worsen or he experience worsening weakness or signs of infection.    Repeat physical exam benign.  I doubt any serious emergency process at this time.  Patient and/or family, friends given strict return precautions for worsening symptoms and care instructions. They have demonstrated understanding of discharge instructions through teach back mechanism. Advised PCP follow-up in 1-2 days.  Patient/family/friend expresses understanding and agrees to plan.    This dictation has been created using voice recognition software. I am continuously working with the software to minimize the number of voice recognition errors and I have made every attempt to manually correct the errors within my dictation. However errors  related to this voice recognition software may still exist and should be interpreted within the appropriate context.     Electronically signed by: Brendan Joseph M.D., 10/3/2023 9:34 PM    LACERATION REPAIR PROCEDURE NOTE  The patient's identification was confirmed and consent was obtained.  This procedure was performed by Dr. Joseph at 9:20 PM.  Site: left hand, thenar eminence  Sterile procedures observed  Anesthetic used (type and amt): .25% Marcaine without epinephrine, 3 cc  Suture type/size: 3-0 Ethilon  Length: 5 cm  # of Sutures:  3  Technique: Simple interrupted and horizontal mattress  Complexity: Complex  Antibx ointment applied  Tetanus UTD  Site anesthetized, irrigated with NS, explored without evidence of foreign body, wound well approximated, site covered with dry, sterile dressing. Patient tolerated procedure well without complications. Instructions for care discussed verbally and patient provided with additional written instructions for homecare and f/u.          ADDITIONAL PROBLEM LIST    DISPOSITION AND DISCUSSIONS  I have discussed management of the patient with the following physicians and CHRISTIAN's:  Vicky    Escalation of care considered, and ultimately not performed:diagnostic imaging    Decision tools and prescription drugs considered including, but not limited to: Pain Medications   over-the-counter pain medications are appropriate, narcotics not indicated at this time  .    FINAL DIAGNOSIS  1. Thumb weakness    2. Laceration of left palm, initial encounter           Electronically signed by: Brendan Joseph M.D., 10/3/2023 5:55 PM

## 2023-10-04 NOTE — ED NOTES
Previous discharge instruction reviewed with pt, pt verbalized understanding. Patient A/0x4 and ambulatory to the [facility] with a steady gait. Patient discharged home to self care.

## 2023-10-04 NOTE — ED NOTES
Performed irrigation to left hand. Used 2 liters of N/S with pressure nozzle cap. Wrapped with adaptic and sterile gauze.

## 2023-10-22 NOTE — DOCUMENTATION QUERY
"                                                                         [facility]                                                                       Query Response Note      PATIENT:               TARYN ELLINGTON  ACCT #:                  [id number]  MRN:                     [id number]  :                      1960  ADMIT DATE:       10/3/2023 3:44 PM  DISCH DATE:        10/3/2023 11:05 PM  RESPONDING  PROVIDER #:        [id number]           QUERY TEXT:    There is conflicting documentation in the medical record.      Chief Complaint is documented as a Left Hand laceration.    Procedure Notes and Final DX is documented as a Right Hand laceration.    Based on treatment, clinical findings and risk factors, can this documentation be further clarified?    Contact information: [email or web address]     The patient's Clinical Indicators include:  * Clinical indicators  Chief Complaint and Physical Exam documented:  Left palm/hand laceration    * Treatment or Monitor  Laceration Repair Procedure documented site: \"Right Hand\"    * Risk Factors:  N/A  Options provided:   -- LEFT hand laceration and repair   -- RIGHT hand laceration and repair      Query created by: Deon Ragland on 10/22/2023 6:20 AM    RESPONSE TEXT:    LEFT hand laceration and repair          Electronically signed by:  CHARLENE ARGUETA MD 10/22/2023 4:32 PM              "

## 2024-06-24 PROBLEM — H26.9 CATARACT, BILATERAL: Status: ACTIVE | Noted: 2017-10-04

## 2024-06-24 PROBLEM — G56.21 ULNAR NERVE ENTRAPMENT, RIGHT: Status: ACTIVE | Noted: 2017-10-04

## 2024-06-24 PROBLEM — E78.5 HYPERLIPIDEMIA: Status: ACTIVE | Noted: 2024-06-24

## 2024-06-24 PROBLEM — G56.21 ULNAR NERVE ENTRAPMENT, RIGHT: Status: RESOLVED | Noted: 2017-10-04 | Resolved: 2024-06-24

## 2024-06-24 PROBLEM — K40.90 INGUINAL HERNIA, LEFT: Status: ACTIVE | Noted: 2017-10-04

## 2024-07-01 ENCOUNTER — OFFICE VISIT (OUTPATIENT)
Dept: INTERNAL MEDICINE | Facility: OTHER | Age: 64
End: 2024-07-01
Payer: MEDICAID

## 2024-07-01 VITALS
HEIGHT: 74 IN | TEMPERATURE: 97.8 F | SYSTOLIC BLOOD PRESSURE: 117 MMHG | DIASTOLIC BLOOD PRESSURE: 75 MMHG | WEIGHT: 197.6 LBS | HEART RATE: 61 BPM | OXYGEN SATURATION: 95 % | BODY MASS INDEX: 25.36 KG/M2

## 2024-07-01 DIAGNOSIS — G89.21 CHRONIC PAIN AFTER MUSCULOSKELETAL INJURY: ICD-10-CM

## 2024-07-01 DIAGNOSIS — Z00.00 HEALTHCARE MAINTENANCE: ICD-10-CM

## 2024-07-01 DIAGNOSIS — E78.5 HYPERLIPIDEMIA, UNSPECIFIED HYPERLIPIDEMIA TYPE: ICD-10-CM

## 2024-07-01 DIAGNOSIS — Z11.4 SCREENING FOR HIV (HUMAN IMMUNODEFICIENCY VIRUS): ICD-10-CM

## 2024-07-01 DIAGNOSIS — Z11.59 NEED FOR HEPATITIS C SCREENING TEST: ICD-10-CM

## 2024-07-01 DIAGNOSIS — I47.10 SVT (SUPRAVENTRICULAR TACHYCARDIA) (HCC): ICD-10-CM

## 2024-07-01 PROCEDURE — 3074F SYST BP LT 130 MM HG: CPT | Mod: GC

## 2024-07-01 PROCEDURE — 90471 IMMUNIZATION ADMIN: CPT | Mod: GC

## 2024-07-01 PROCEDURE — 99204 OFFICE O/P NEW MOD 45 MIN: CPT | Mod: 25,GC

## 2024-07-01 PROCEDURE — 3078F DIAST BP <80 MM HG: CPT | Mod: GC

## 2024-07-01 PROCEDURE — 93000 ELECTROCARDIOGRAM COMPLETE: CPT | Mod: GC

## 2024-07-01 PROCEDURE — 90677 PCV20 VACCINE IM: CPT | Mod: GC

## 2024-07-01 ASSESSMENT — FIBROSIS 4 INDEX: FIB4 SCORE: 0.9

## 2024-07-16 ENCOUNTER — HOSPITAL ENCOUNTER (OUTPATIENT)
Dept: LAB | Facility: MEDICAL CENTER | Age: 64
End: 2024-07-16
Payer: MEDICAID

## 2024-07-16 DIAGNOSIS — Z00.00 HEALTHCARE MAINTENANCE: ICD-10-CM

## 2024-07-16 DIAGNOSIS — Z11.4 SCREENING FOR HIV (HUMAN IMMUNODEFICIENCY VIRUS): ICD-10-CM

## 2024-07-16 DIAGNOSIS — Z11.59 NEED FOR HEPATITIS C SCREENING TEST: ICD-10-CM

## 2024-07-16 DIAGNOSIS — E78.5 HYPERLIPIDEMIA, UNSPECIFIED HYPERLIPIDEMIA TYPE: ICD-10-CM

## 2024-07-16 LAB
ALBUMIN SERPL BCP-MCNC: 4.2 G/DL (ref 3.2–4.9)
ALBUMIN/GLOB SERPL: 1.8 G/DL
ALP SERPL-CCNC: 74 U/L (ref 30–99)
ALT SERPL-CCNC: 26 U/L (ref 2–50)
ANION GAP SERPL CALC-SCNC: 12 MMOL/L (ref 7–16)
AST SERPL-CCNC: 19 U/L (ref 12–45)
BASOPHILS # BLD AUTO: 1 % (ref 0–1.8)
BASOPHILS # BLD: 0.07 K/UL (ref 0–0.12)
BILIRUB SERPL-MCNC: 0.5 MG/DL (ref 0.1–1.5)
BUN SERPL-MCNC: 15 MG/DL (ref 8–22)
CALCIUM ALBUM COR SERPL-MCNC: 9.1 MG/DL (ref 8.5–10.5)
CALCIUM SERPL-MCNC: 9.3 MG/DL (ref 8.5–10.5)
CHLORIDE SERPL-SCNC: 106 MMOL/L (ref 96–112)
CHOLEST SERPL-MCNC: 190 MG/DL (ref 100–199)
CO2 SERPL-SCNC: 23 MMOL/L (ref 20–33)
CREAT SERPL-MCNC: 0.97 MG/DL (ref 0.5–1.4)
EOSINOPHIL # BLD AUTO: 0.05 K/UL (ref 0–0.51)
EOSINOPHIL NFR BLD: 0.7 % (ref 0–6.9)
ERYTHROCYTE [DISTWIDTH] IN BLOOD BY AUTOMATED COUNT: 48.4 FL (ref 35.9–50)
EST. AVERAGE GLUCOSE BLD GHB EST-MCNC: 123 MG/DL
GFR SERPLBLD CREATININE-BSD FMLA CKD-EPI: 88 ML/MIN/1.73 M 2
GLOBULIN SER CALC-MCNC: 2.4 G/DL (ref 1.9–3.5)
GLUCOSE SERPL-MCNC: 95 MG/DL (ref 65–99)
HBA1C MFR BLD: 5.9 % (ref 4–5.6)
HCT VFR BLD AUTO: 42.3 % (ref 42–52)
HCV AB SER QL: NORMAL
HDLC SERPL-MCNC: 38 MG/DL
HGB BLD-MCNC: 14.4 G/DL (ref 14–18)
HIV 1+2 AB+HIV1 P24 AG SERPL QL IA: NORMAL
IMM GRANULOCYTES # BLD AUTO: 0.02 K/UL (ref 0–0.11)
IMM GRANULOCYTES NFR BLD AUTO: 0.3 % (ref 0–0.9)
LDLC SERPL CALC-MCNC: 114 MG/DL
LYMPHOCYTES # BLD AUTO: 1.75 K/UL (ref 1–4.8)
LYMPHOCYTES NFR BLD: 26 % (ref 22–41)
MCH RBC QN AUTO: 28.6 PG (ref 27–33)
MCHC RBC AUTO-ENTMCNC: 34 G/DL (ref 32.3–36.5)
MCV RBC AUTO: 83.9 FL (ref 81.4–97.8)
MONOCYTES # BLD AUTO: 0.52 K/UL (ref 0–0.85)
MONOCYTES NFR BLD AUTO: 7.7 % (ref 0–13.4)
NEUTROPHILS # BLD AUTO: 4.33 K/UL (ref 1.82–7.42)
NEUTROPHILS NFR BLD: 64.3 % (ref 44–72)
NRBC # BLD AUTO: 0 K/UL
NRBC BLD-RTO: 0 /100 WBC (ref 0–0.2)
PLATELET # BLD AUTO: 304 K/UL (ref 164–446)
PMV BLD AUTO: 9.6 FL (ref 9–12.9)
POTASSIUM SERPL-SCNC: 4.5 MMOL/L (ref 3.6–5.5)
PROT SERPL-MCNC: 6.6 G/DL (ref 6–8.2)
RBC # BLD AUTO: 5.04 M/UL (ref 4.7–6.1)
SODIUM SERPL-SCNC: 141 MMOL/L (ref 135–145)
TRIGL SERPL-MCNC: 190 MG/DL (ref 0–149)
WBC # BLD AUTO: 6.7 K/UL (ref 4.8–10.8)

## 2024-07-16 PROCEDURE — 80053 COMPREHEN METABOLIC PANEL: CPT

## 2024-07-16 PROCEDURE — 36415 COLL VENOUS BLD VENIPUNCTURE: CPT

## 2024-07-16 PROCEDURE — 85025 COMPLETE CBC W/AUTO DIFF WBC: CPT

## 2024-07-16 PROCEDURE — 87389 HIV-1 AG W/HIV-1&-2 AB AG IA: CPT

## 2024-07-16 PROCEDURE — 83036 HEMOGLOBIN GLYCOSYLATED A1C: CPT

## 2024-07-16 PROCEDURE — 86803 HEPATITIS C AB TEST: CPT

## 2024-07-16 PROCEDURE — 80061 LIPID PANEL: CPT

## 2024-08-12 ENCOUNTER — APPOINTMENT (OUTPATIENT)
Dept: RADIOLOGY | Facility: MEDICAL CENTER | Age: 64
End: 2024-08-12
Attending: EMERGENCY MEDICINE
Payer: OTHER MISCELLANEOUS

## 2024-08-12 ENCOUNTER — HOSPITAL ENCOUNTER (EMERGENCY)
Facility: MEDICAL CENTER | Age: 64
End: 2024-08-12
Attending: EMERGENCY MEDICINE
Payer: OTHER MISCELLANEOUS

## 2024-08-12 ENCOUNTER — PHARMACY VISIT (OUTPATIENT)
Dept: PHARMACY | Facility: MEDICAL CENTER | Age: 64
End: 2024-08-12
Payer: COMMERCIAL

## 2024-08-12 VITALS
WEIGHT: 195 LBS | TEMPERATURE: 97.8 F | RESPIRATION RATE: 16 BRPM | OXYGEN SATURATION: 91 % | HEART RATE: 85 BPM | DIASTOLIC BLOOD PRESSURE: 77 MMHG | SYSTOLIC BLOOD PRESSURE: 120 MMHG | HEIGHT: 74 IN | BODY MASS INDEX: 25.03 KG/M2

## 2024-08-12 DIAGNOSIS — I71.21 ANEURYSM OF ASCENDING AORTA WITHOUT RUPTURE (HCC): ICD-10-CM

## 2024-08-12 DIAGNOSIS — S02.2XXA CLOSED FRACTURE OF NASAL BONE, INITIAL ENCOUNTER: ICD-10-CM

## 2024-08-12 DIAGNOSIS — S49.91XA INJURY OF RIGHT SHOULDER, INITIAL ENCOUNTER: ICD-10-CM

## 2024-08-12 DIAGNOSIS — S02.5XXA CLOSED FRACTURE OF TOOTH, INITIAL ENCOUNTER: ICD-10-CM

## 2024-08-12 DIAGNOSIS — S99.911A INJURY OF RIGHT ANKLE, INITIAL ENCOUNTER: ICD-10-CM

## 2024-08-12 DIAGNOSIS — E04.1 THYROID NODULE: ICD-10-CM

## 2024-08-12 LAB
ABO + RH BLD: NORMAL
ABO GROUP BLD: NORMAL
ALBUMIN SERPL BCP-MCNC: 4.1 G/DL (ref 3.2–4.9)
ALBUMIN/GLOB SERPL: 1.6 G/DL
ALP SERPL-CCNC: 76 U/L (ref 30–99)
ALT SERPL-CCNC: 20 U/L (ref 2–50)
ANION GAP SERPL CALC-SCNC: 15 MMOL/L (ref 7–16)
APTT PPP: 25.9 SEC (ref 24.7–36)
AST SERPL-CCNC: 23 U/L (ref 12–45)
BILIRUB SERPL-MCNC: 0.7 MG/DL (ref 0.1–1.5)
BLD GP AB SCN SERPL QL: NORMAL
BUN SERPL-MCNC: 17 MG/DL (ref 8–22)
CALCIUM ALBUM COR SERPL-MCNC: 8.8 MG/DL (ref 8.5–10.5)
CALCIUM SERPL-MCNC: 8.9 MG/DL (ref 8.5–10.5)
CHLORIDE SERPL-SCNC: 104 MMOL/L (ref 96–112)
CO2 SERPL-SCNC: 19 MMOL/L (ref 20–33)
CREAT SERPL-MCNC: 0.9 MG/DL (ref 0.5–1.4)
ERYTHROCYTE [DISTWIDTH] IN BLOOD BY AUTOMATED COUNT: 48.7 FL (ref 35.9–50)
ETHANOL BLD-MCNC: <10.1 MG/DL
GFR SERPLBLD CREATININE-BSD FMLA CKD-EPI: 96 ML/MIN/1.73 M 2
GLOBULIN SER CALC-MCNC: 2.5 G/DL (ref 1.9–3.5)
GLUCOSE SERPL-MCNC: 148 MG/DL (ref 65–99)
HCT VFR BLD AUTO: 43.1 % (ref 42–52)
HGB BLD-MCNC: 14.3 G/DL (ref 14–18)
INR PPP: 1.06 (ref 0.87–1.13)
MCH RBC QN AUTO: 27.9 PG (ref 27–33)
MCHC RBC AUTO-ENTMCNC: 33.2 G/DL (ref 32.3–36.5)
MCV RBC AUTO: 84.2 FL (ref 81.4–97.8)
PLATELET # BLD AUTO: 328 K/UL (ref 164–446)
PMV BLD AUTO: 8.9 FL (ref 9–12.9)
POTASSIUM SERPL-SCNC: 4 MMOL/L (ref 3.6–5.5)
PROT SERPL-MCNC: 6.6 G/DL (ref 6–8.2)
PROTHROMBIN TIME: 13.9 SEC (ref 12–14.6)
RBC # BLD AUTO: 5.12 M/UL (ref 4.7–6.1)
RH BLD: NORMAL
SODIUM SERPL-SCNC: 138 MMOL/L (ref 135–145)
WBC # BLD AUTO: 9.1 K/UL (ref 4.8–10.8)

## 2024-08-12 PROCEDURE — 73600 X-RAY EXAM OF ANKLE: CPT | Mod: RT

## 2024-08-12 PROCEDURE — 72125 CT NECK SPINE W/O DYE: CPT

## 2024-08-12 PROCEDURE — 96374 THER/PROPH/DIAG INJ IV PUSH: CPT

## 2024-08-12 PROCEDURE — 71045 X-RAY EXAM CHEST 1 VIEW: CPT

## 2024-08-12 PROCEDURE — 85027 COMPLETE CBC AUTOMATED: CPT

## 2024-08-12 PROCEDURE — 85730 THROMBOPLASTIN TIME PARTIAL: CPT

## 2024-08-12 PROCEDURE — 86850 RBC ANTIBODY SCREEN: CPT

## 2024-08-12 PROCEDURE — RXMED WILLOW AMBULATORY MEDICATION CHARGE: Performed by: EMERGENCY MEDICINE

## 2024-08-12 PROCEDURE — 700101 HCHG RX REV CODE 250: Mod: UD | Performed by: EMERGENCY MEDICINE

## 2024-08-12 PROCEDURE — 36415 COLL VENOUS BLD VENIPUNCTURE: CPT

## 2024-08-12 PROCEDURE — 70486 CT MAXILLOFACIAL W/O DYE: CPT

## 2024-08-12 PROCEDURE — 82077 ASSAY SPEC XCP UR&BREATH IA: CPT

## 2024-08-12 PROCEDURE — 71260 CT THORAX DX C+: CPT

## 2024-08-12 PROCEDURE — 86901 BLOOD TYPING SEROLOGIC RH(D): CPT

## 2024-08-12 PROCEDURE — 72128 CT CHEST SPINE W/O DYE: CPT

## 2024-08-12 PROCEDURE — 99285 EMERGENCY DEPT VISIT HI MDM: CPT

## 2024-08-12 PROCEDURE — 305948 HCHG GREEN TRAUMA ACT PRE-NOTIFY NO CC

## 2024-08-12 PROCEDURE — 72170 X-RAY EXAM OF PELVIS: CPT

## 2024-08-12 PROCEDURE — 85610 PROTHROMBIN TIME: CPT

## 2024-08-12 PROCEDURE — 700117 HCHG RX CONTRAST REV CODE 255: Performed by: EMERGENCY MEDICINE

## 2024-08-12 PROCEDURE — 86900 BLOOD TYPING SEROLOGIC ABO: CPT

## 2024-08-12 PROCEDURE — 80053 COMPREHEN METABOLIC PANEL: CPT

## 2024-08-12 PROCEDURE — 96375 TX/PRO/DX INJ NEW DRUG ADDON: CPT

## 2024-08-12 PROCEDURE — 70450 CT HEAD/BRAIN W/O DYE: CPT

## 2024-08-12 PROCEDURE — 700111 HCHG RX REV CODE 636 W/ 250 OVERRIDE (IP): Mod: JZ | Performed by: EMERGENCY MEDICINE

## 2024-08-12 PROCEDURE — 72131 CT LUMBAR SPINE W/O DYE: CPT

## 2024-08-12 RX ORDER — LIDOCAINE 50 MG/G
1 PATCH TOPICAL EVERY 24 HOURS
Qty: 10 PATCH | Refills: 0 | Status: SHIPPED | OUTPATIENT
Start: 2024-08-12 | End: 2024-09-09

## 2024-08-12 RX ORDER — KETOROLAC TROMETHAMINE 15 MG/ML
15 INJECTION, SOLUTION INTRAMUSCULAR; INTRAVENOUS EVERY 6 HOURS PRN
Status: DISCONTINUED | OUTPATIENT
Start: 2024-08-12 | End: 2024-08-12 | Stop reason: HOSPADM

## 2024-08-12 RX ORDER — LIDOCAINE 4 G/G
2 PATCH TOPICAL EVERY 24 HOURS
Status: DISCONTINUED | OUTPATIENT
Start: 2024-08-12 | End: 2024-08-12 | Stop reason: HOSPADM

## 2024-08-12 RX ORDER — MORPHINE SULFATE 4 MG/ML
4 INJECTION INTRAVENOUS ONCE
Status: COMPLETED | OUTPATIENT
Start: 2024-08-12 | End: 2024-08-12

## 2024-08-12 RX ORDER — ONDANSETRON 2 MG/ML
INJECTION INTRAMUSCULAR; INTRAVENOUS
Status: COMPLETED | OUTPATIENT
Start: 2024-08-12 | End: 2024-08-12

## 2024-08-12 RX ORDER — OXYCODONE AND ACETAMINOPHEN 5; 325 MG/1; MG/1
1 TABLET ORAL EVERY 4 HOURS PRN
Qty: 20 TABLET | Refills: 0 | Status: SHIPPED | OUTPATIENT
Start: 2024-08-12 | End: 2024-08-17

## 2024-08-12 RX ADMIN — KETOROLAC TROMETHAMINE 15 MG: 15 INJECTION, SOLUTION INTRAMUSCULAR; INTRAVENOUS at 18:20

## 2024-08-12 RX ADMIN — MORPHINE SULFATE 4 MG: 4 INJECTION, SOLUTION INTRAMUSCULAR; INTRAVENOUS at 17:52

## 2024-08-12 RX ADMIN — ONDANSETRON 4 MG: 2 INJECTION INTRAMUSCULAR; INTRAVENOUS at 15:54

## 2024-08-12 RX ADMIN — FENTANYL CITRATE 50 MCG: 50 INJECTION, SOLUTION INTRAMUSCULAR; INTRAVENOUS at 15:52

## 2024-08-12 RX ADMIN — IOHEXOL 100 ML: 350 INJECTION, SOLUTION INTRAVENOUS at 16:12

## 2024-08-12 RX ADMIN — LIDOCAINE 2 PATCH: 4 PATCH TOPICAL at 18:19
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
## 2024-08-13 ENCOUNTER — OFFICE VISIT (OUTPATIENT)
Dept: INTERNAL MEDICINE | Facility: OTHER | Age: 64
End: 2024-08-13
Payer: MEDICAID

## 2024-08-13 VITALS
HEIGHT: 74 IN | BODY MASS INDEX: 25.13 KG/M2 | OXYGEN SATURATION: 92 % | DIASTOLIC BLOOD PRESSURE: 68 MMHG | TEMPERATURE: 98.2 F | HEART RATE: 78 BPM | WEIGHT: 195.8 LBS | SYSTOLIC BLOOD PRESSURE: 100 MMHG

## 2024-08-13 DIAGNOSIS — M79.671 ACUTE PAIN OF RIGHT FOOT: ICD-10-CM

## 2024-08-13 DIAGNOSIS — R19.5 LOOSE STOOLS: ICD-10-CM

## 2024-08-13 DIAGNOSIS — I71.21 ANEURYSM OF ASCENDING AORTA WITHOUT RUPTURE (HCC): ICD-10-CM

## 2024-08-13 DIAGNOSIS — E78.5 DYSLIPIDEMIA (HIGH LDL; LOW HDL): ICD-10-CM

## 2024-08-13 DIAGNOSIS — S02.2XXS CLOSED FRACTURE OF NASAL BONE, SEQUELA: ICD-10-CM

## 2024-08-13 DIAGNOSIS — M25.511 ACUTE PAIN OF RIGHT SHOULDER DUE TO TRAUMA: ICD-10-CM

## 2024-08-13 DIAGNOSIS — Z72.0 CURRENT TOBACCO USE: ICD-10-CM

## 2024-08-13 DIAGNOSIS — S29.9XXA TRAUMATIC INJURY OF RIB: ICD-10-CM

## 2024-08-13 DIAGNOSIS — S29.9XXA INJURY OF CHEST WALL, INITIAL ENCOUNTER: ICD-10-CM

## 2024-08-13 DIAGNOSIS — E04.1 THYROID NODULE GREATER THAN OR EQUAL TO 1.5 CM IN DIAMETER INCIDENTALLY NOTED ON IMAGING STUDY: ICD-10-CM

## 2024-08-13 DIAGNOSIS — I47.10 SVT (SUPRAVENTRICULAR TACHYCARDIA) (HCC): ICD-10-CM

## 2024-08-13 DIAGNOSIS — Z91.89 CANDIDATE FOR STATIN THERAPY DUE TO RISK OF FUTURE CARDIOVASCULAR EVENT: ICD-10-CM
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
## 2024-08-13 DIAGNOSIS — R73.03 PREDIABETES: ICD-10-CM

## 2024-08-13 DIAGNOSIS — G89.11 ACUTE PAIN OF RIGHT SHOULDER DUE TO TRAUMA: ICD-10-CM

## 2024-08-13 PROBLEM — S02.2XXA CLOSED FRACTURE OF NASAL BONES: Status: ACTIVE | Noted: 2024-08-13

## 2024-08-13 PROCEDURE — 99214 OFFICE O/P EST MOD 30 MIN: CPT | Mod: GC

## 2024-08-13 RX ORDER — ROSUVASTATIN CALCIUM 20 MG/1
20 TABLET, COATED ORAL EVERY EVENING
Qty: 30 TABLET | Refills: 11 | Status: SHIPPED | OUTPATIENT
Start: 2024-08-13

## 2024-08-13 RX ORDER — OXYCODONE AND ACETAMINOPHEN 5; 325 MG/1; MG/1
1 TABLET ORAL EVERY 4 HOURS PRN
COMMUNITY
Start: 2024-08-12

## 2024-08-13 RX ORDER — LIDOCAINE 50 MG/G
PATCH TOPICAL
Qty: 30 PATCH | Refills: 0 | Status: SHIPPED | OUTPATIENT
Start: 2024-08-13

## 2024-08-13 RX ORDER — LIDOCAINE 50 MG/G
1 PATCH TOPICAL EVERY 24 HOURS
COMMUNITY

## 2024-08-13 ASSESSMENT — ENCOUNTER SYMPTOMS
DIARRHEA: 0
SPUTUM PRODUCTION: 0
ABDOMINAL PAIN: 0
BACK PAIN: 1
COUGH: 0
WHEEZING: 0
CHILLS: 0
FEVER: 0
VOMITING: 0
LOSS OF CONSCIOUSNESS: 0
HEARTBURN: 0
SHORTNESS OF BREATH: 0
BLOOD IN STOOL: 0
WEAKNESS: 1
ORTHOPNEA: 0
DIZZINESS: 0
HEADACHES: 0
WEIGHT LOSS: 0
CONSTIPATION: 0
HEMOPTYSIS: 0
PALPITATIONS: 1
PND: 0
MYALGIAS: 1
NAUSEA: 0
CLAUDICATION: 0

## 2024-08-13 ASSESSMENT — VISUAL ACUITY: OU: 1

## 2024-08-13 ASSESSMENT — FIBROSIS 4 INDEX: FIB4 SCORE: 0.76

## 2024-08-13 ASSESSMENT — PATIENT HEALTH QUESTIONNAIRE - PHQ9: CLINICAL INTERPRETATION OF PHQ2 SCORE: 0
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
## 2024-08-13 NOTE — ED NOTES
Patient with mild hypoxia after ambulation and pain medication. Discussed with ERP, will monitor prior to discharge.

## 2024-08-13 NOTE — ED NOTES
Pt discharged home. GCS 15. IV discontinued and gauze placed, pt in possession of belongings. Pt provided discharge education and information pertaining to medications and follow up appointments. Pt received copy of discharge instructions and verbalized understanding.

## 2024-08-13 NOTE — PATIENT INSTRUCTIONS
[facility]  [phone]       Get new labs done      Thyroid ultrasound
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
## 2024-08-13 NOTE — ED NOTES
Patient discharged per order. Oral and written discharge instructions reviewed. IV removed. Medications sent to home pharmacy. New medications reviewed. Opiate consent signed. All belongings accounted for and taken with patient. Questions answered, and patient agrees with discharge plan. Encouraged to follow up with PCP. Ambulatory to [facility], encouraged to follow up with ortho and plastics as needed. Patient given shirt for discharge, belongings taken with patient. Discharged home with family

## 2024-08-14 NOTE — DISCHARGE PLANNING
Patient calls today re: drug testing done outside of our facility.  He is asking if an initial was done on arrival to our facility. Patient informed that there is no documentation that this occurred that I can see.    Patient states understanding. I also notify him that I do not see any documentation that [facility] or EMS gave any medications but he could call [facility] to verify that information.    Patient is appreciative of call back.

## 2024-08-21 ENCOUNTER — HOSPITAL ENCOUNTER (OUTPATIENT)
Dept: RADIOLOGY | Facility: MEDICAL CENTER | Age: 64
End: 2024-08-21
Payer: MEDICAID

## 2024-08-21 PROCEDURE — 76536 US EXAM OF HEAD AND NECK: CPT

## 2024-09-03 ENCOUNTER — TELEPHONE (OUTPATIENT)
Dept: CARDIOLOGY | Facility: MEDICAL CENTER | Age: 64
End: 2024-09-03
Payer: MEDICAID

## 2024-09-03 NOTE — TELEPHONE ENCOUNTER
Received medical records from [facility] and scanned to pt's chart under media.

## 2024-09-03 NOTE — TELEPHONE ENCOUNTER
Spoke to patient in regards to records for NP appointment with [facility].     Patient has seen a cardiologist before?  Yes   If yes, where?: [place] ( [facility]) and [place] ([facility])     Any recent cardiac testing outside of [facility]?  Yes   What testing: EKG tracings and all cardiac-related medical records.      Where was it completed?: [facility]    Were any records requested?  Yes   Fax: [facility] [phone]              Fax: [facility] [phone]

## 2024-09-03 NOTE — TELEPHONE ENCOUNTER
Requested medical records from [facility] and fax confirmation received. Scanned to [facility].      Requested medical records from [facility] and fax confirmation received. Scanned to [facility].

## 2024-09-09 ENCOUNTER — OFFICE VISIT (OUTPATIENT)
Dept: CARDIOLOGY | Facility: MEDICAL CENTER | Age: 64
End: 2024-09-09
Payer: MEDICAID

## 2024-09-09 ENCOUNTER — TELEPHONE (OUTPATIENT)
Dept: CARDIOLOGY | Facility: MEDICAL CENTER | Age: 64
End: 2024-09-09

## 2024-09-09 VITALS
OXYGEN SATURATION: 96 % | DIASTOLIC BLOOD PRESSURE: 62 MMHG | SYSTOLIC BLOOD PRESSURE: 118 MMHG | HEART RATE: 74 BPM | RESPIRATION RATE: 16 BRPM | HEIGHT: 74 IN | WEIGHT: 197 LBS | BODY MASS INDEX: 25.28 KG/M2

## 2024-09-09 DIAGNOSIS — Z72.0 CURRENT TOBACCO USE: ICD-10-CM

## 2024-09-09 DIAGNOSIS — I71.21 ANEURYSM OF ASCENDING AORTA WITHOUT RUPTURE (HCC): ICD-10-CM

## 2024-09-09 DIAGNOSIS — E78.00 PURE HYPERCHOLESTEROLEMIA: ICD-10-CM

## 2024-09-09 DIAGNOSIS — I47.10 SVT (SUPRAVENTRICULAR TACHYCARDIA) (HCC): ICD-10-CM

## 2024-09-09 LAB — EKG IMPRESSION: NORMAL

## 2024-09-09 PROCEDURE — 3078F DIAST BP <80 MM HG: CPT | Performed by: INTERNAL MEDICINE

## 2024-09-09 PROCEDURE — 99204 OFFICE O/P NEW MOD 45 MIN: CPT | Mod: 25 | Performed by: INTERNAL MEDICINE

## 2024-09-09 PROCEDURE — 93010 ELECTROCARDIOGRAM REPORT: CPT | Performed by: INTERNAL MEDICINE

## 2024-09-09 PROCEDURE — 99406 BEHAV CHNG SMOKING 3-10 MIN: CPT | Performed by: INTERNAL MEDICINE

## 2024-09-09 PROCEDURE — 99211 OFF/OP EST MAY X REQ PHY/QHP: CPT | Performed by: INTERNAL MEDICINE

## 2024-09-09 PROCEDURE — 93005 ELECTROCARDIOGRAM TRACING: CPT | Performed by: INTERNAL MEDICINE

## 2024-09-09 PROCEDURE — 3074F SYST BP LT 130 MM HG: CPT | Performed by: INTERNAL MEDICINE

## 2024-09-09 RX ORDER — METOPROLOL SUCCINATE 25 MG/1
50 TABLET, EXTENDED RELEASE ORAL DAILY
Qty: 90 TABLET | Refills: 3 | Status: SHIPPED | OUTPATIENT
Start: 2024-09-09

## 2024-09-09 RX ORDER — ROSUVASTATIN CALCIUM 5 MG/1
5 TABLET, COATED ORAL EVERY EVENING
Qty: 90 TABLET | Refills: 3 | Status: SHIPPED | OUTPATIENT
Start: 2024-09-09

## 2024-09-09 ASSESSMENT — ENCOUNTER SYMPTOMS
WEAKNESS: 0
DEPRESSION: 0
EYES NEGATIVE: 1
VOMITING: 0
CHILLS: 0
FOCAL WEAKNESS: 0
DIZZINESS: 0
ABDOMINAL PAIN: 0
SHORTNESS OF BREATH: 0
MUSCULOSKELETAL NEGATIVE: 1
PALPITATIONS: 1
CLAUDICATION: 0
BRUISES/BLEEDS EASILY: 0
GASTROINTESTINAL NEGATIVE: 1
WEIGHT LOSS: 0
RESPIRATORY NEGATIVE: 1
FEVER: 0
BLURRED VISION: 0
CONSTITUTIONAL NEGATIVE: 1
PSYCHIATRIC NEGATIVE: 1
DOUBLE VISION: 0
NAUSEA: 0
HEADACHES: 0
COUGH: 0
NEUROLOGICAL NEGATIVE: 1
NERVOUS/ANXIOUS: 0
MYALGIAS: 0

## 2024-09-09 ASSESSMENT — FIBROSIS 4 INDEX: FIB4 SCORE: 0.97
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
## 2024-09-09 NOTE — TELEPHONE ENCOUNTER
Schedule [facility] w/aortic root per . Emailed Dr. Rich to Stefany BOOKER to have .

## 2024-09-10 ENCOUNTER — HOSPITAL ENCOUNTER (OUTPATIENT)
Dept: CARDIOLOGY | Facility: MEDICAL CENTER | Age: 64
End: 2024-09-10
Attending: INTERNAL MEDICINE
Payer: MEDICAID

## 2024-09-10 ENCOUNTER — PATIENT MESSAGE (OUTPATIENT)
Dept: CARDIOLOGY | Facility: MEDICAL CENTER | Age: 64
End: 2024-09-10
Payer: MEDICAID

## 2024-09-10 DIAGNOSIS — I47.10 SVT (SUPRAVENTRICULAR TACHYCARDIA) (HCC): ICD-10-CM

## 2024-09-10 DIAGNOSIS — I71.21 ANEURYSM OF ASCENDING AORTA WITHOUT RUPTURE (HCC): ICD-10-CM

## 2024-09-10 LAB
LV EJECT FRACT  99904: 65
LV EJECT FRACT MOD 2C 99903: 62.15
LV EJECT FRACT MOD 4C 99902: 63.78
LV EJECT FRACT MOD BP 99901: 62.79

## 2024-09-10 PROCEDURE — 93306 TTE W/DOPPLER COMPLETE: CPT

## 2024-09-10 PROCEDURE — 93306 TTE W/DOPPLER COMPLETE: CPT | Mod: 26 | Performed by: INTERNAL MEDICINE

## 2024-09-10 NOTE — TELEPHONE ENCOUNTER
Patient is scheduled on 9-16-24 for a C w/aortic root with Dr. Dtoson. No meds to stop and patient to check in at 7:30 for a 9:30 procedure. H&P was done on 9-9-24 by Dr. Tarango. Pre admit to call patient.

## 2024-09-11 ENCOUNTER — APPOINTMENT (OUTPATIENT)
Dept: ADMISSIONS | Facility: MEDICAL CENTER | Age: 64
End: 2024-09-11
Attending: INTERNAL MEDICINE
Payer: MEDICAID

## 2024-09-13 ENCOUNTER — PRE-ADMISSION TESTING (OUTPATIENT)
Dept: ADMISSIONS | Facility: MEDICAL CENTER | Age: 64
End: 2024-09-13
Attending: INTERNAL MEDICINE
Payer: MEDICAID

## 2024-09-13 DIAGNOSIS — Z01.810 PRE-OPERATIVE CARDIOVASCULAR EXAMINATION: ICD-10-CM

## 2024-09-13 DIAGNOSIS — Z01.812 PRE-OPERATIVE LABORATORY EXAMINATION: ICD-10-CM

## 2024-09-13 LAB
ALBUMIN SERPL BCP-MCNC: 4.3 G/DL (ref 3.2–4.9)
ALBUMIN/GLOB SERPL: 1.5 G/DL
ALP SERPL-CCNC: 131 U/L (ref 30–99)
ALT SERPL-CCNC: 21 U/L (ref 2–50)
ANION GAP SERPL CALC-SCNC: 10 MMOL/L (ref 7–16)
APTT PPP: 29 SEC (ref 24.7–36)
AST SERPL-CCNC: 21 U/L (ref 12–45)
BILIRUB SERPL-MCNC: 0.5 MG/DL (ref 0.1–1.5)
BUN SERPL-MCNC: 14 MG/DL (ref 8–22)
CALCIUM ALBUM COR SERPL-MCNC: 8.9 MG/DL (ref 8.5–10.5)
CALCIUM SERPL-MCNC: 9.1 MG/DL (ref 8.5–10.5)
CHLORIDE SERPL-SCNC: 105 MMOL/L (ref 96–112)
CO2 SERPL-SCNC: 24 MMOL/L (ref 20–33)
CREAT SERPL-MCNC: 0.9 MG/DL (ref 0.5–1.4)
EKG IMPRESSION: NORMAL
ERYTHROCYTE [DISTWIDTH] IN BLOOD BY AUTOMATED COUNT: 47 FL (ref 35.9–50)
GFR SERPLBLD CREATININE-BSD FMLA CKD-EPI: 96 ML/MIN/1.73 M 2
GLOBULIN SER CALC-MCNC: 2.8 G/DL (ref 1.9–3.5)
GLUCOSE SERPL-MCNC: 104 MG/DL (ref 65–99)
HCT VFR BLD AUTO: 42.3 % (ref 42–52)
HGB BLD-MCNC: 13.6 G/DL (ref 14–18)
INR PPP: 1 (ref 0.87–1.13)
MCH RBC QN AUTO: 27.6 PG (ref 27–33)
MCHC RBC AUTO-ENTMCNC: 32.2 G/DL (ref 32.3–36.5)
MCV RBC AUTO: 85.8 FL (ref 81.4–97.8)
PLATELET # BLD AUTO: 311 K/UL (ref 164–446)
PMV BLD AUTO: 8.8 FL (ref 9–12.9)
POTASSIUM SERPL-SCNC: 4.2 MMOL/L (ref 3.6–5.5)
PROT SERPL-MCNC: 7.1 G/DL (ref 6–8.2)
PROTHROMBIN TIME: 13.3 SEC (ref 12–14.6)
RBC # BLD AUTO: 4.93 M/UL (ref 4.7–6.1)
SODIUM SERPL-SCNC: 139 MMOL/L (ref 135–145)
WBC # BLD AUTO: 8.1 K/UL (ref 4.8–10.8)

## 2024-09-13 PROCEDURE — 85730 THROMBOPLASTIN TIME PARTIAL: CPT

## 2024-09-13 PROCEDURE — 36415 COLL VENOUS BLD VENIPUNCTURE: CPT

## 2024-09-13 PROCEDURE — 93005 ELECTROCARDIOGRAM TRACING: CPT

## 2024-09-13 PROCEDURE — 80053 COMPREHEN METABOLIC PANEL: CPT

## 2024-09-13 PROCEDURE — 93010 ELECTROCARDIOGRAM REPORT: CPT | Performed by: INTERNAL MEDICINE

## 2024-09-13 PROCEDURE — 85027 COMPLETE CBC AUTOMATED: CPT

## 2024-09-13 PROCEDURE — 85610 PROTHROMBIN TIME: CPT

## 2024-09-13 RX ORDER — ACETAMINOPHEN 650 MG/1
650 SUPPOSITORY RECTAL EVERY 4 HOURS PRN
Status: ON HOLD | COMMUNITY
End: 2024-09-16

## 2024-09-13 RX ORDER — IBUPROFEN 200 MG
200 TABLET ORAL EVERY 6 HOURS PRN
COMMUNITY
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
## 2024-09-16 ENCOUNTER — APPOINTMENT (OUTPATIENT)
Dept: CARDIOLOGY | Facility: MEDICAL CENTER | Age: 64
End: 2024-09-16
Attending: INTERNAL MEDICINE
Payer: MEDICAID

## 2024-09-16 ENCOUNTER — HOSPITAL ENCOUNTER (OUTPATIENT)
Facility: MEDICAL CENTER | Age: 64
End: 2024-09-16
Attending: INTERNAL MEDICINE | Admitting: INTERNAL MEDICINE
Payer: MEDICAID

## 2024-09-16 VITALS
TEMPERATURE: 97.4 F | OXYGEN SATURATION: 94 % | DIASTOLIC BLOOD PRESSURE: 80 MMHG | SYSTOLIC BLOOD PRESSURE: 119 MMHG | BODY MASS INDEX: 24.95 KG/M2 | RESPIRATION RATE: 16 BRPM | WEIGHT: 194.45 LBS | HEIGHT: 74 IN | HEART RATE: 56 BPM

## 2024-09-16 DIAGNOSIS — I71.21 ANEURYSM OF ASCENDING AORTA WITHOUT RUPTURE (HCC): ICD-10-CM

## 2024-09-16 PROCEDURE — 160046 HCHG PACU - 1ST 60 MINS PHASE II

## 2024-09-16 PROCEDURE — C1769 GUIDE WIRE: HCPCS

## 2024-09-16 PROCEDURE — 700111 HCHG RX REV CODE 636 W/ 250 OVERRIDE (IP): Mod: UD

## 2024-09-16 PROCEDURE — 93458 L HRT ARTERY/VENTRICLE ANGIO: CPT | Mod: 26 | Performed by: INTERNAL MEDICINE

## 2024-09-16 PROCEDURE — 99152 MOD SED SAME PHYS/QHP 5/>YRS: CPT | Performed by: INTERNAL MEDICINE

## 2024-09-16 PROCEDURE — 93567 NJX CAR CTH SPRVLV AORTGRPHY: CPT | Performed by: INTERNAL MEDICINE

## 2024-09-16 PROCEDURE — 160036 HCHG PACU - EA ADDL 30 MINS PHASE I

## 2024-09-16 PROCEDURE — 700117 HCHG RX CONTRAST REV CODE 255: Mod: UD | Performed by: INTERNAL MEDICINE

## 2024-09-16 PROCEDURE — 160035 HCHG PACU - 1ST 60 MINS PHASE I

## 2024-09-16 PROCEDURE — 160002 HCHG RECOVERY MINUTES (STAT)

## 2024-09-16 PROCEDURE — 700101 HCHG RX REV CODE 250: Mod: UD

## 2024-09-16 RX ORDER — LIDOCAINE HYDROCHLORIDE 20 MG/ML
INJECTION, SOLUTION INFILTRATION; PERINEURAL
Status: COMPLETED
Start: 2024-09-16 | End: 2024-09-16

## 2024-09-16 RX ORDER — MIDAZOLAM HYDROCHLORIDE 1 MG/ML
INJECTION INTRAMUSCULAR; INTRAVENOUS
Status: COMPLETED
Start: 2024-09-16 | End: 2024-09-16

## 2024-09-16 RX ORDER — VERAPAMIL HYDROCHLORIDE 2.5 MG/ML
INJECTION, SOLUTION INTRAVENOUS
Status: COMPLETED
Start: 2024-09-16 | End: 2024-09-16

## 2024-09-16 RX ORDER — HEPARIN SODIUM 200 [USP'U]/100ML
INJECTION, SOLUTION INTRAVENOUS
Status: COMPLETED
Start: 2024-09-16 | End: 2024-09-16

## 2024-09-16 RX ORDER — SODIUM CHLORIDE 9 MG/ML
INJECTION, SOLUTION INTRAVENOUS CONTINUOUS
Status: DISCONTINUED | OUTPATIENT
Start: 2024-09-16 | End: 2024-09-16 | Stop reason: HOSPADM

## 2024-09-16 RX ORDER — HEPARIN SODIUM 1000 [USP'U]/ML
INJECTION, SOLUTION INTRAVENOUS; SUBCUTANEOUS
Status: COMPLETED
Start: 2024-09-16 | End: 2024-09-16

## 2024-09-16 RX ORDER — ACETAMINOPHEN 500 MG
1000 TABLET ORAL EVERY 6 HOURS PRN
COMMUNITY

## 2024-09-16 RX ADMIN — HEPARIN SODIUM: 1000 INJECTION, SOLUTION INTRAVENOUS; SUBCUTANEOUS at 10:56

## 2024-09-16 RX ADMIN — VERAPAMIL HYDROCHLORIDE 2.5 MG: 2.5 INJECTION, SOLUTION INTRAVENOUS at 10:56

## 2024-09-16 RX ADMIN — MIDAZOLAM HYDROCHLORIDE 2 MG: 1 INJECTION, SOLUTION INTRAMUSCULAR; INTRAVENOUS at 11:18

## 2024-09-16 RX ADMIN — NITROGLYCERIN 10 ML: 20 INJECTION INTRAVENOUS at 10:56

## 2024-09-16 RX ADMIN — IOHEXOL 50 ML: 350 INJECTION, SOLUTION INTRAVENOUS at 11:20

## 2024-09-16 RX ADMIN — LIDOCAINE HYDROCHLORIDE: 20 INJECTION, SOLUTION INFILTRATION; PERINEURAL at 10:56

## 2024-09-16 RX ADMIN — FENTANYL CITRATE 75 MCG: 50 INJECTION, SOLUTION INTRAMUSCULAR; INTRAVENOUS at 11:19

## 2024-09-16 RX ADMIN — HEPARIN SODIUM 2000 UNITS: 200 INJECTION, SOLUTION INTRAVENOUS at 10:56

## 2024-09-16 ASSESSMENT — PAIN DESCRIPTION - PAIN TYPE: TYPE: SURGICAL PAIN

## 2024-09-16 ASSESSMENT — FIBROSIS 4 INDEX: FIB4 SCORE: 0.91

## 2024-09-16 NOTE — DISCHARGE INSTRUCTIONS
POST ANGIOGRAM  General Care Instructions  Maintain a bandage over the incision site for 24 hours.  It's normal to find a small bruise or dime-sized lump at the insertion site. This should disappear within a few weeks.  Do not apply lotions or powders to the site.  Do not immerse the catheter insertion site in water (bathtub/swimming) for five days. It is ok to shower 24 hours after the procedure.  You may resume your normal diet immediately; on the day of your procedure, drink 6-10 glasses of water to help flush the contrast liquid out of your system.  If the doctor inserted the catheter in your arm:  For 3 days, DO NOT lift anything heavier than 10 pounds (approximately a gallon of milk). DO NOT do any heavy pushing, pulling, or twisting.    Medications  If your current medications need to be changed, you will be provided with an updated list of your medications prior to discharge.  If you take warfarin (Coumadin), resume taking your usual dose the evening after the procedure.  DO NOT STOP taking prescribed blood thinning (anti-platelet) medications unless instructed by your cardiologist.  These medications include:  Aspirin, Clopidogrel (Plavix), Ticagrelor (Brilinta), or Prasugrel (Effient)   If you take one of the following anticoagulants, RESUME 24 HOURS after your procedure:  Apixiban (Eliquis), Rivaroxaban (Xarelto), Dabigatran (Pradaxa), Edoxaban (Savaysa)  If you take metformin (Glucophage), RESUME 48 HOURS after your procedure.    When to call your healthcare provider  Call your cardiologist right away at [phone] if you have any of the following:   Problems/Concerns taking any of your prescribed heart medicines.   The insertion site has increasing pain, swelling, redness, bleeding, or drainage.   Your arm or leg below where the insertion site changes color, is cool, or is numb.   You have chest pain or shortness of breath that does not go away with rest.   Your pulse feels irregular -- very slow  (less than 60 beats/minute) or very fast (over 100 beats/minute).   You have dizziness, fainting, or you are very tired.   You are coughing up blood or yellow or green mucus.   You have chills or a fever over 101°F (38.3°C).    If there is bleeding at the catheter insertion site, apply pressure for 10 minutes.  If bleeding persists, call 911, and continue to hold pressure until advanced medical support arrives.        Exercising Safely After Percutaneous Coronary Intervention (PCI)  After percutaneous coronary intervention (PCI), which involves angioplasty and often stenting, it's important to focus on your heart health. Exercise can help strengthen your heart. It can also help you feel good and improve your overall health. Talk with your health care provider or cardiac rehab team member about good options for you.  Start slowly. Work up to more vigorous exercise as you get stronger. Aim for at least 150 minutes of exercise each week.  Include aerobic activities. These make the heart beat faster. They work the heart and lungs, and improve the body's ability to use oxygen. Good choices include walking, swimming, and biking .  Always follow your doctor's recommendation for exercise.   You have been referred to cardiac rehabilitation, which is important for your recovery.  You may contact [facility] at [phone] to learn more and schedule a visit.        Lifestyle Management After Percutaneous Coronary Intervention (PCI)  Percutaneous coronary intervention (PCI)  involves angioplasty and often stenting. This procedure can open arteries and relieve symptoms. But, it doesn't cure coronary artery disease. New blockages can still form. You need to take steps to prevent this by managing risk factors. Doing so will help make your heart and arteries healthier. Your doctor may prescribe cardiac rehabilitation to help with this lifelong process.  Understanding risk factors  Some risk factors for  coronary artery disease can be controlled. These include smoking, high blood pressure, cholesterol, diabetes, and obesity. They can be managed with medication, diet, and exercise. Support and counseling can also play a role. The effort will pay off! Managing risk factors can help you be more active, feel better, and reduce the risk of heart attack.    If you smoke, quit!  If your doctor has been urging you to quit smoking, it's for good reasons. Smoking damages your heart, blood vessels, and lungs. The good news is that quitting can halt or even reverse the damage of smoking. To quit now:  Get medical help. Ask your doctor for advice on stop-smoking programs. Also ask about medications or nicotine replacement therapy products that may help you quit smoking.  Get support. Join a support group. Ask for help from your family and friends.  Don't give up. It often takes several tries to succeed in quitting smoking.  Avoid secondhand smoke. Ask family and friends not to smoke around you.

## 2024-09-16 NOTE — PROGRESS NOTES
1133 - patient arrived to pacu.  2 identifiers verified, attached to monitors, alarms/parameters verified, and received report.  Right radial site assessed with cath lab RN.  Site is clean dry and soft with TR Band in place.  Oriented to unit and plan of care discussed.   Provided water and snacks.  Patient declines pain, nausea, and numbness/tingling in hand.      1145 - patient's daughter phoned, given updates.     1240 - 3mL of air released from TR band.  Site remains clean, dry, and soft.  No signs of bleeding    1255 - 3mL of air released from TR band.  Site remains clean, dry, and soft.  No signs of bleeding    1310 - 3mL of air released from TR band.  Site remains clean, dry, and soft.  No signs of bleeding    1325 - 3mL of air released from TR band.  Site remains clean, dry, and soft.  No signs of bleeding    1340 - TR Band removed.  Gauze and tegaderm applied.  Phase 2

## 2024-09-16 NOTE — PROCEDURES
Cardiac Catheterization report    9/16/2024  11:35 AM    Referring MD: Dr. Tarango    Indication/Preoperative diagnosis:  Thoracic aortic aneurysm  Hyperlipidemia    Postoperative diagnosis:  Mild nonobstructive coronary artery disease  Dilated thoracic aorta    Recommendations:  Guideline directed medical therapy   Cardiovascular Risk factor modification      Procedures performed:  Coronary arteriograms  Left heart catheterization and Aortic root arteriogram   Supervision moderate sedation      FINDINGS:  I.  HEMODYNAMICS   Ao: 87/62 mmHg   LVEDP: 13 mmHg   Gradient on LV pullback: No    II. CORONARY ANGIOGRAPHY:  Left main coronary artery: Large bifurcatingno CAD  Left anterior descending artery: Moderate caliber usual complement diagonals trivial nonobstructive CAD  Left circumflex coronary artery: Moderate caliber nondominant mild nonobstructive CAD  Right coronary artery: Moderate to large caliber dominant mild nonobstructive CAD maximum eccentric diameter stenosis 30%.    III.  THORACIC AORTOGRAM:  Dilated thoracic aorta, minimally calcified.      Procedure details:  The risks and benefits of cardiac catheterization and possible intervention were explained to the patient including death, heart attack, stroke, and emergency surgery.  The patient verbalized understanding and wished to proceed.  The patient was brought to the cardiac catheterization laboratory in the fasting state and prepped and draped in the usual sterile fashion.  The right wrist was locally anesthetized with lidocaine and the right radial artery was cannulated with 5/6-Faroese equipment and standard radial cocktail was given.  Coronary angiography was performed using standard  diagnostic catheters in the usual fashion and used to cross the aortic valve to perform  left heart catheterization and left ventriculogram.  Diagnostic catheter change for 6 Faroese pigtail catheter seen by the aortic valve.  Contrast was administered at 15 cc/s for 30 cc  for thoracic aortogram.  All catheters and guidewires were removed.  A TR-Band was placed without difficulty to achieve patent hemostasis.  Patient tolerated procedure well left the Cath Lab in stable condition.    Complications:  None apparent    Sedation time:  Moderate sedation directly monitored by me during the case while supervising the administration of the sedation medication by an independent trained RN to assist in the monitoring of the patient's level of consciousness and physiological status. I, the supervising physician was present the entire time from beginning of medication administration until the end of the procedure from 11:06 AM until 11:22 AM. For detailed administration records please see the moderate sedation documentation in the media tab.    Following the procedure I discussed the results with the patient and the patients designated contact/family member.    Andrew Dotson MD, FACC, [facility]

## 2024-09-16 NOTE — OR NURSING
Report received from Juan Alberto. Pt meets DC criteria. DC instructions reviewed with patient and daughter. All questions answered. PIV removed.     1435 Pt discharged. All belongings sent with patient. Pt wheeled out by RN to car. Daughter taking patient home.

## 2024-09-18 ENCOUNTER — NON-PROVIDER VISIT (OUTPATIENT)
Dept: CARDIOLOGY | Facility: MEDICAL CENTER | Age: 64
End: 2024-09-18
Attending: INTERNAL MEDICINE
Payer: MEDICAID

## 2024-09-18 ENCOUNTER — TELEPHONE (OUTPATIENT)
Dept: HEALTH INFORMATION MANAGEMENT | Facility: OTHER | Age: 64
End: 2024-09-18
Payer: MEDICAID

## 2024-09-18 DIAGNOSIS — I47.10 SVT (SUPRAVENTRICULAR TACHYCARDIA) (HCC): ICD-10-CM

## 2024-09-18 NOTE — PROGRESS NOTES
Patient enrolled in the 14 day [facility] Holter monitoring program per Melquiades Tarango MD.  >Monitor shipped to patient by [employer].  >Currently pending EOS.

## 2024-09-24 ENCOUNTER — OFFICE VISIT (OUTPATIENT)
Dept: INTERNAL MEDICINE | Facility: OTHER | Age: 64
End: 2024-09-24
Payer: MEDICAID

## 2024-09-24 VITALS
DIASTOLIC BLOOD PRESSURE: 75 MMHG | TEMPERATURE: 97.8 F | HEIGHT: 74 IN | WEIGHT: 199.6 LBS | BODY MASS INDEX: 25.62 KG/M2 | OXYGEN SATURATION: 96 % | HEART RATE: 71 BPM | SYSTOLIC BLOOD PRESSURE: 121 MMHG

## 2024-09-24 DIAGNOSIS — E04.1 THYROID NODULE GREATER THAN OR EQUAL TO 1.5 CM IN DIAMETER INCIDENTALLY NOTED ON IMAGING STUDY: ICD-10-CM

## 2024-09-24 DIAGNOSIS — R49.9 CHANGE IN VOICE: ICD-10-CM

## 2024-09-24 DIAGNOSIS — Z23 NEED FOR VACCINE FOR DT (DIPHTHERIA-TETANUS): ICD-10-CM

## 2024-09-24 DIAGNOSIS — I35.1 NONRHEUMATIC AORTIC VALVE INSUFFICIENCY: ICD-10-CM

## 2024-09-24 DIAGNOSIS — Z80.0 FAMILY HISTORY OF COLON CANCER: ICD-10-CM

## 2024-09-24 DIAGNOSIS — R53.82 CHRONIC FATIGUE, UNSPECIFIED: ICD-10-CM

## 2024-09-24 ASSESSMENT — ENCOUNTER SYMPTOMS
DIARRHEA: 0
CHILLS: 0
BLOOD IN STOOL: 0
SHORTNESS OF BREATH: 0
COUGH: 0
WHEEZING: 0
ABDOMINAL PAIN: 0
PALPITATIONS: 1
FEVER: 0
WEIGHT LOSS: 0
LOSS OF CONSCIOUSNESS: 0
WEAKNESS: 1
HEMOPTYSIS: 0
CLAUDICATION: 0
MYALGIAS: 1
NAUSEA: 0
CONSTIPATION: 0
HEADACHES: 0
SPUTUM PRODUCTION: 0
BACK PAIN: 1
HEARTBURN: 0
PND: 0
ORTHOPNEA: 0
DIZZINESS: 0
VOMITING: 0

## 2024-09-24 ASSESSMENT — PAIN SCALES - GENERAL: PAINLEVEL: NO PAIN

## 2024-09-24 ASSESSMENT — FIBROSIS 4 INDEX: FIB4 SCORE: 0.91
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
## 2024-09-25 ENCOUNTER — OFFICE VISIT (OUTPATIENT)
Dept: CARDIOTHORACIC SURGERY | Facility: MEDICAL CENTER | Age: 64
End: 2024-09-25
Payer: MEDICAID

## 2024-09-25 VITALS
HEIGHT: 74 IN | BODY MASS INDEX: 25.21 KG/M2 | TEMPERATURE: 97.9 F | WEIGHT: 196.4 LBS | DIASTOLIC BLOOD PRESSURE: 70 MMHG | HEART RATE: 68 BPM | OXYGEN SATURATION: 96 % | SYSTOLIC BLOOD PRESSURE: 114 MMHG

## 2024-09-25 DIAGNOSIS — I71.21 ANEURYSM OF ASCENDING AORTA WITHOUT RUPTURE (HCC): ICD-10-CM

## 2024-09-25 DIAGNOSIS — I35.1 AORTIC VALVE INSUFFICIENCY, ETIOLOGY OF CARDIAC VALVE DISEASE UNSPECIFIED: ICD-10-CM

## 2024-09-25 ASSESSMENT — ENCOUNTER SYMPTOMS
MYALGIAS: 1
RESPIRATORY NEGATIVE: 1
PSYCHIATRIC NEGATIVE: 1
GASTROINTESTINAL NEGATIVE: 1
BLURRED VISION: 1
HEADACHES: 1
CARDIOVASCULAR NEGATIVE: 1

## 2024-09-25 ASSESSMENT — FIBROSIS 4 INDEX: FIB4 SCORE: 0.91

## 2024-09-25 NOTE — PROGRESS NOTES
Problem: Prehabilitation    Goal: optimize identified modifiable risk factors prior to cardiac surgery.     Intervention: Screening and interventions for the following  risk factors with educational materials provided if indicated  and patient demonstrates readiness to participate.  Dentition, malnutrition, CAD and dietary cholesterol,  obesity, alcohol and tobacco abuse, illegal drug use,  recent eye surgery/procedures, A1C > 7.5 for pharmacotherapy referral,  and social support system for post discharge planning.    Additionally, home exercise regimen initiation or continuation  (if appropriate), and teaching of and participation of  inspiratory muscle training via incentive spirometer (provided).    Review of post-surgical physical limitations, upcoming  appointments & testing, ordered diagnostics, and medication review  to identify anticoagulants, antihypertensives, HF and DM medications  that need cessation prior to surgery.    The cardiac surgery prehabilitation sheet, surgery instruction sheet,  MAP, and education booklet was provided for patient to read and review.    Surgical CHG wipes were also provided with instructions on use.    DENTITION:  Routine dental appointment prior to surgery  Is  indicated for valve procedure.    he was not encouraged to get a dental   cleaning as he does get regular  dental cleaning and denies current dental   infection or issues that should be  addressed prior to surgery.    INCENTIVE SPIROMETRY:  Discussed importance of incentive spirometry (IS) use,  20 times a day AT MINIMUM but more often if possible to  optimize cardio-pulmonary function prior to surgery.  They were instructed to allow a 5 minute rest in  between uses to achieve max IS volume.  Education with return demonstration performed.   Coaching was provided, patient is effective.    Prehabilitation IS baseline is 3500.    HOME EXERCISE REGIMEN:  We discussed importance of preventing deconditioning and  muscle  wasting in the time preceding surgery as this will occur  post surgery.    > 50 % left main disease present? NO  EF-- 65%  Active sub lingual nitro use? NO  he is appropriate for initiation  of a home exercise regimen.    he is very physically active; current  exercise tolerance/level is high due to no  symptoms with moderate activity.    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     he was educated  that if chest pain or SOB occurs patient is to stop  immediately and if symptoms do not  resolve after stopping to call 911.    he was also encouraged to practice sit to stand  from a chair with one arm to assist or no arm assistance  12 times a day to strengthen quadriceps, improve balance,  and to mentally prepare for this restriction    CAD:  Patient does not have known CAD; education on  cholesterol, diet, and the heart are not indicated  and were not provided.    OBESITY:  Patient's BMI is not over 30.  Education regarding portion  Sizing and diet are not indicated and were not provided.    MALNUTRITION:  Malnutrition screening tool (MST) shows he is  not at risk with a score of 1.  (0-1 not at risk; greater or equal to 2 is at risk).    Given MST score, functional capacity,   and no reported muscle loss, it was not  recommended they increase their protein  intake to 1.2 to 2.5 gram/kg/day.    SMOKING:  Patient denies current smoking history.  Smoking risks  and cessation education not indicated and was not provided.    ALCOHOL ABUSE:  Patient denies alcohol abuse.  Alcohol risks and cessation  education not indicated and was not provided.    ILLEGAL DRUG USE:  Patient denies illicit drug use.  Illicit drug use risks  and cessation education not indicated and was not provided.    SOCIAL SUPPORT SYSTEM FOR DISCHARGE NEEDS:    Patient does have family, either his 2 daughters, or his parents may come to town to stay post discharge to assist with  ADL's. No barriers  to hospital discharge anticipated.    PSYCHOLOGICAL PREPARATION:  We discussed the basics of physical limitation post op to include:               No driving for 4 weeks               No lifting, pushing or pulling > 10 lbs for 6 weeks  Sternal precautions to include moving  Within the tube and safe mobility in and  out of bed and the chair.    ANTIBIOTIC STEWARDSHIP:  MRSA swab will be collected by [facility] during pre-admit testing.    MEDICATION AN UPCOMING APPOINTMENTS REVIEW:  Current medications were reviewed that may need  specific stop dates prior to surgery. The patient was instructed   to stop the following medications after the indicated date.    No meds to stop    Vascular scheduling sheet was provided and explained.    Walk test Times: 3 3 3    A phone appointment for pre op education was made  for 11/12 between 7-11 to review all provided education materials.

## 2024-09-26 ENCOUNTER — HOSPITAL ENCOUNTER (OUTPATIENT)
Facility: MEDICAL CENTER | Age: 64
End: 2024-09-26
Attending: THORACIC SURGERY (CARDIOTHORACIC VASCULAR SURGERY) | Admitting: THORACIC SURGERY (CARDIOTHORACIC VASCULAR SURGERY)
Payer: MEDICAID

## 2024-09-26 ENCOUNTER — HOSPITAL ENCOUNTER (OUTPATIENT)
Dept: RADIOLOGY | Facility: MEDICAL CENTER | Age: 64
End: 2024-09-26
Attending: NURSE PRACTITIONER
Payer: MEDICAID

## 2024-09-26 DIAGNOSIS — I35.1 AORTIC VALVE INSUFFICIENCY, ETIOLOGY OF CARDIAC VALVE DISEASE UNSPECIFIED: ICD-10-CM

## 2024-09-26 PROCEDURE — 93880 EXTRACRANIAL BILAT STUDY: CPT | Mod: 26 | Performed by: INTERNAL MEDICINE

## 2024-09-26 PROCEDURE — 93880 EXTRACRANIAL BILAT STUDY: CPT

## 2024-09-27 ENCOUNTER — TELEPHONE (OUTPATIENT)
Dept: CARDIOLOGY | Facility: MEDICAL CENTER | Age: 64
End: 2024-09-27
Payer: MEDICAID

## 2024-09-27 NOTE — LETTER
PROCEDURE/SURGERY CLEARANCE FORM      Encounter Date: 9/27/2024    Patient: Donovan Vail  YOB: 1960    CARDIOLOGIST:  Melquiades Tarango M.D.    REFERRING DOCTOR:  No ref. provider found    Procedure/surgery: Colonoscopy w/ Deep (propofol) Sedation                                           Additional comments: Please defer until ascending aortic aneurysm repair with Yann Bernard scheduled for 11/15/24.          Electronically signed        MD Signature   Melquiades Tarango M.D.

## 2024-09-27 NOTE — TELEPHONE ENCOUNTER
Last OV: 09.09.2024  Proposed Surgery: Colonoscopy w/ Deep (propofol) Sedation  Surgery Date: 10.31.2024  Requesting Office Name: [facility]  Fax Number: [phone]  Preference of Location (default is surgery center unless specified by Cardiologist or CHRISTIAN)  Prior Clearance Addressed: No      Anticoags/Antiplatelets: NONE  Anticoags/Antiplatelet managed by Cardiology? N/A  Outstanding Cardiac Imaging : Yes  Other CT-CTA   Clearance to provider to review  Stent, Cardiac Devices, or Catheterization: Yes  Date : 09.16.2024  and Within the last 6 months. Forward to provider to review.  Ablation, TAVR/Valve (including open heart), Cardioversion: No  Recent Cardiac Hospitalization: No            When: N/A  History (cardiac history):   Past Medical History:   Diagnosis Date    Arrhythmia 2024    svt    Dental disorder     none per pt    Disorder of thyroid 09/13/2024    polyps on thyroid    Hyperlipidemia     Marijuana use 11/17/2017    Pain 11/17/2017    Chronic pain to right arm due to numerous surgeries    Ulnar nerve entrapment, right 10/04/2017             Surgical Clearance Letter Sent: No Provider to advise.   **Scan clearance request letter into [facility].**

## 2024-09-28 PROBLEM — I35.1 AORTIC INSUFFICIENCY: Status: ACTIVE | Noted: 2024-09-28
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
## 2024-10-02 ENCOUNTER — HOSPITAL ENCOUNTER (OUTPATIENT)
Dept: RADIOLOGY | Facility: MEDICAL CENTER | Age: 64
End: 2024-10-02
Payer: MEDICAID

## 2024-10-02 DIAGNOSIS — E04.1 THYROID NODULE GREATER THAN OR EQUAL TO 1.5 CM IN DIAMETER INCIDENTALLY NOTED ON IMAGING STUDY: ICD-10-CM

## 2024-10-02 PROCEDURE — 76942 ECHO GUIDE FOR BIOPSY: CPT

## 2024-10-02 PROCEDURE — 88173 CYTOPATH EVAL FNA REPORT: CPT

## 2024-10-02 PROCEDURE — 10006 FNA BX W/US GDN EA ADDL: CPT

## 2024-10-03 LAB — CYTOLOGY REG CYTOL: NORMAL

## 2024-10-14 PROBLEM — E06.3 LYMPHOCYTIC THYROIDITIS: Status: ACTIVE | Noted: 2024-10-14

## 2024-10-23 ENCOUNTER — TELEPHONE (OUTPATIENT)
Dept: CARDIOTHORACIC SURGERY | Facility: MEDICAL CENTER | Age: 64
End: 2024-10-23
Payer: MEDICAID

## 2024-11-05 ENCOUNTER — TELEPHONE (OUTPATIENT)
Dept: CARDIOLOGY | Facility: MEDICAL CENTER | Age: 64
End: 2024-11-05
Payer: MEDICAID

## 2024-11-05 DIAGNOSIS — I47.10 SVT (SUPRAVENTRICULAR TACHYCARDIA) (HCC): ICD-10-CM

## 2024-11-05 RX ORDER — METOPROLOL SUCCINATE 50 MG/1
50 TABLET, EXTENDED RELEASE ORAL 2 TIMES DAILY
Qty: 180 TABLET | Refills: 3 | Status: SHIPPED | OUTPATIENT
Start: 2024-11-05

## 2024-11-05 NOTE — TELEPHONE ENCOUNTER
Lucita- referral placed to EP. Pt symptomatic. Thank you!  ========================    Phone Number Called: [phone]    Call outcome: Spoke to patient regarding message below.    Message: Called to discuss. Medication reviewed and discussed referral. Pt not excited about taking more metoprolol. Verbalized understanding, appreciative of call

## 2024-11-05 NOTE — TELEPHONE ENCOUNTER
Phone Number Called: [phone]    Call outcome: Spoke to patient regarding message below.    Message: Called and discussed how pt was currently feeling. Pt states he is tired and the episodes take a lot out of him. No chest pain. I advised that JI will review and make any recommendations. Appreciative of call

## 2024-11-05 NOTE — TELEPHONE ENCOUNTER
Caller: IRHYTHM    Abnormal Results    Date and time of urgent report: 10/3/24 @ 4:44 AM  Symptomatic SVT -  BPM for 60 seconds    Page/Strip: page 15 strip 3    Reference Number: [id number]    Route to Radha Burgos & Sonja Chiu

## 2024-11-05 NOTE — TELEPHONE ENCOUNTER
Urgent [employer] EOS to 's nurse, Stefany, on 11/5/2024    Urgent notification for SVT    Of note:  SVT detected within +/- 45 seconds of symptomatic patient events  Idioventricular rhythm present    Preliminary findings:    1 episode of VT  with an avg rate of 125 bpm    195 episodes of SVT  with an avg rate of 139 bpm    Sinus rhythm  with an avg rate of 71 bpm    3.4% isolated SVE(s), 1.1% SVE couplets, rare SVE triplets present    Rare ventricular ectopy present    53 patient events:  -207  SR 66-93  SVE(s)  VE(s)

## 2024-11-06 ENCOUNTER — OFFICE VISIT (OUTPATIENT)
Dept: INTERNAL MEDICINE | Facility: OTHER | Age: 64
End: 2024-11-06
Payer: MEDICAID

## 2024-11-06 VITALS
HEART RATE: 72 BPM | SYSTOLIC BLOOD PRESSURE: 113 MMHG | WEIGHT: 198.2 LBS | DIASTOLIC BLOOD PRESSURE: 74 MMHG | BODY MASS INDEX: 25.44 KG/M2 | OXYGEN SATURATION: 96 % | HEIGHT: 74 IN | TEMPERATURE: 98.2 F

## 2024-11-06 DIAGNOSIS — I47.10 SVT (SUPRAVENTRICULAR TACHYCARDIA) (HCC): ICD-10-CM

## 2024-11-06 DIAGNOSIS — Z72.0 CURRENT TOBACCO USE: ICD-10-CM

## 2024-11-06 DIAGNOSIS — I71.21 ANEURYSM OF ASCENDING AORTA WITHOUT RUPTURE (HCC): ICD-10-CM

## 2024-11-06 PROCEDURE — 99213 OFFICE O/P EST LOW 20 MIN: CPT | Mod: GE

## 2024-11-06 PROCEDURE — 3078F DIAST BP <80 MM HG: CPT | Mod: GC

## 2024-11-06 PROCEDURE — 3074F SYST BP LT 130 MM HG: CPT | Mod: GC

## 2024-11-06 ASSESSMENT — ENCOUNTER SYMPTOMS
FEVER: 0
SPUTUM PRODUCTION: 0
COUGH: 0
DIARRHEA: 0
BLOOD IN STOOL: 0
VOMITING: 0
NAUSEA: 0
WEIGHT LOSS: 0
WHEEZING: 0
PALPITATIONS: 1
WEAKNESS: 1
PND: 0
HEMOPTYSIS: 0
LOSS OF CONSCIOUSNESS: 0
CONSTIPATION: 0
HEADACHES: 0
MYALGIAS: 1
DIZZINESS: 0
CHILLS: 0
BACK PAIN: 1
ABDOMINAL PAIN: 0
SHORTNESS OF BREATH: 0
ORTHOPNEA: 0
CLAUDICATION: 0
HEARTBURN: 0

## 2024-11-06 ASSESSMENT — FIBROSIS 4 INDEX: FIB4 SCORE: 0.91
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
## 2024-11-10 RX ORDER — METOPROLOL TARTRATE 25 MG/1
12.5 TABLET, FILM COATED ORAL ONCE
OUTPATIENT
Start: 2024-11-25 | End: 2024-11-25

## 2024-11-10 RX ORDER — ACETAMINOPHEN 500 MG
1000 TABLET ORAL ONCE
OUTPATIENT
Start: 2024-11-25 | End: 2024-11-25

## 2024-11-10 RX ORDER — METHADONE HYDROCHLORIDE 10 MG/ML
20 INJECTION, SOLUTION INTRAMUSCULAR; INTRAVENOUS; SUBCUTANEOUS ONCE
OUTPATIENT
Start: 2024-11-10 | End: 2024-11-10

## 2024-11-13 ENCOUNTER — APPOINTMENT (OUTPATIENT)
Dept: ADMISSIONS | Facility: MEDICAL CENTER | Age: 64
End: 2024-11-13
Attending: THORACIC SURGERY (CARDIOTHORACIC VASCULAR SURGERY)
Payer: MEDICAID

## 2024-11-15 ENCOUNTER — PRE-ADMISSION TESTING (OUTPATIENT)
Dept: ADMISSIONS | Facility: MEDICAL CENTER | Age: 64
End: 2024-11-15
Attending: THORACIC SURGERY (CARDIOTHORACIC VASCULAR SURGERY)
Payer: MEDICAID

## 2024-11-15 VITALS — BODY MASS INDEX: 25.45 KG/M2 | HEIGHT: 74 IN

## 2024-11-15 NOTE — PREPROCEDURE INSTRUCTIONS
Preadmit: Telephone preadmit done with patient scheduled for procedure on 11/25/24 with Dr. Smith. Patient to follow fasting guidelines and medication instructions per cardiac nurse navigator. Preadmit testing appointment scheduled for 11/22/24.

## 2024-11-20 ENCOUNTER — TELEPHONE (OUTPATIENT)
Dept: CARDIOTHORACIC SURGERY | Facility: MEDICAL CENTER | Age: 64
End: 2024-11-20
Payer: MEDICAID

## 2024-11-20 NOTE — TELEPHONE ENCOUNTER
Patient was reminded that aortic root replacement surgery with Dr. Smith is on 11/25 at 0730 in the morning. He  is are aware check in is at 0515 am.    PAT date and time was reviewed with patient and  verified it is within 72 hours of surgery, 11/22 at 0800.    Vascular studies and procedures: carotids  were scheduled, completed,  and reviewed by myself for concerning  results did not need escalation to the [facility]/MD.    he did not need a dental check/work.    Baseline IS was 3500. he has been somewhat compliant   with the IS. Volume has improved to 4000.    he was prescribed a walking regimen or  told to continue he current regimen and  He has been compliant.   He has been practicing sit to stand.    he had no meds to stop    he was told that no medication (s) are okay to  take the morning of surgery prior to check in.    The G wipes instructions were reviewed and understood.    he was reminded no food after midnight.    Call time 9 minutes

## 2024-11-22 ENCOUNTER — PRE-ADMISSION TESTING (OUTPATIENT)
Dept: ADMISSIONS | Facility: MEDICAL CENTER | Age: 64
End: 2024-11-22
Attending: THORACIC SURGERY (CARDIOTHORACIC VASCULAR SURGERY)
Payer: MEDICAID

## 2024-11-22 ENCOUNTER — HOSPITAL ENCOUNTER (OUTPATIENT)
Dept: RADIOLOGY | Facility: MEDICAL CENTER | Age: 64
End: 2024-11-22
Attending: THORACIC SURGERY (CARDIOTHORACIC VASCULAR SURGERY) | Admitting: THORACIC SURGERY (CARDIOTHORACIC VASCULAR SURGERY)
Payer: MEDICAID

## 2024-11-22 DIAGNOSIS — Z01.812 PRE-OPERATIVE LABORATORY EXAMINATION: ICD-10-CM

## 2024-11-22 DIAGNOSIS — Z01.811 PRE-OPERATIVE RESPIRATORY EXAMINATION: ICD-10-CM

## 2024-11-22 DIAGNOSIS — Z01.810 PRE-OPERATIVE CARDIOVASCULAR EXAMINATION: ICD-10-CM

## 2024-11-22 DIAGNOSIS — E78.00 PURE HYPERCHOLESTEROLEMIA: ICD-10-CM

## 2024-11-22 LAB
ABO GROUP BLD: NORMAL
ALBUMIN SERPL BCP-MCNC: 4.3 G/DL (ref 3.2–4.9)
ALBUMIN/GLOB SERPL: 1.5 G/DL
ALP SERPL-CCNC: 100 U/L (ref 30–99)
ALT SERPL-CCNC: 15 U/L (ref 2–50)
AMPHET UR QL SCN: POSITIVE
ANION GAP SERPL CALC-SCNC: 11 MMOL/L (ref 7–16)
APPEARANCE UR: CLEAR
APTT PPP: 29.4 SEC (ref 24.7–36)
AST SERPL-CCNC: 21 U/L (ref 12–45)
BARBITURATES UR QL SCN: NEGATIVE
BASOPHILS # BLD AUTO: 0.7 % (ref 0–1.8)
BASOPHILS # BLD: 0.06 K/UL (ref 0–0.12)
BENZODIAZ UR QL SCN: NEGATIVE
BILIRUB SERPL-MCNC: 0.4 MG/DL (ref 0.1–1.5)
BILIRUB UR QL STRIP.AUTO: NEGATIVE
BLD GP AB SCN SERPL QL: NORMAL
BUN SERPL-MCNC: 15 MG/DL (ref 8–22)
BZE UR QL SCN: NEGATIVE
CALCIUM ALBUM COR SERPL-MCNC: 9.1 MG/DL (ref 8.5–10.5)
CALCIUM SERPL-MCNC: 9.3 MG/DL (ref 8.5–10.5)
CANNABINOIDS UR QL SCN: POSITIVE
CHLORIDE SERPL-SCNC: 104 MMOL/L (ref 96–112)
CO2 SERPL-SCNC: 24 MMOL/L (ref 20–33)
COLOR UR: YELLOW
CREAT SERPL-MCNC: 1.21 MG/DL (ref 0.5–1.4)
EKG IMPRESSION: NORMAL
EOSINOPHIL # BLD AUTO: 0.09 K/UL (ref 0–0.51)
EOSINOPHIL NFR BLD: 1.1 % (ref 0–6.9)
ERYTHROCYTE [DISTWIDTH] IN BLOOD BY AUTOMATED COUNT: 44.3 FL (ref 35.9–50)
EST. AVERAGE GLUCOSE BLD GHB EST-MCNC: 120 MG/DL
FENTANYL UR QL: NEGATIVE
GFR SERPLBLD CREATININE-BSD FMLA CKD-EPI: 67 ML/MIN/1.73 M 2
GLOBULIN SER CALC-MCNC: 2.8 G/DL (ref 1.9–3.5)
GLUCOSE SERPL-MCNC: 130 MG/DL (ref 65–99)
GLUCOSE UR STRIP.AUTO-MCNC: NEGATIVE MG/DL
HBA1C MFR BLD: 5.8 % (ref 4–5.6)
HCT VFR BLD AUTO: 47.5 % (ref 42–52)
HGB BLD-MCNC: 15.4 G/DL (ref 14–18)
IMM GRANULOCYTES # BLD AUTO: 0.02 K/UL (ref 0–0.11)
IMM GRANULOCYTES NFR BLD AUTO: 0.2 % (ref 0–0.9)
INR PPP: 0.99 (ref 0.87–1.13)
KETONES UR STRIP.AUTO-MCNC: NEGATIVE MG/DL
LEUKOCYTE ESTERASE UR QL STRIP.AUTO: NEGATIVE
LYMPHOCYTES # BLD AUTO: 2.62 K/UL (ref 1–4.8)
LYMPHOCYTES NFR BLD: 32.3 % (ref 22–41)
MCH RBC QN AUTO: 27.6 PG (ref 27–33)
MCHC RBC AUTO-ENTMCNC: 32.4 G/DL (ref 32.3–36.5)
MCV RBC AUTO: 85.1 FL (ref 81.4–97.8)
METHADONE UR QL SCN: NEGATIVE
MICRO URNS: NORMAL
MONOCYTES # BLD AUTO: 0.61 K/UL (ref 0–0.85)
MONOCYTES NFR BLD AUTO: 7.5 % (ref 0–13.4)
NEUTROPHILS # BLD AUTO: 4.71 K/UL (ref 1.82–7.42)
NEUTROPHILS NFR BLD: 58.2 % (ref 44–72)
NITRITE UR QL STRIP.AUTO: NEGATIVE
NRBC # BLD AUTO: 0 K/UL
NRBC BLD-RTO: 0 /100 WBC (ref 0–0.2)
OPIATES UR QL SCN: NEGATIVE
OXYCODONE UR QL SCN: NEGATIVE
PCP UR QL SCN: NEGATIVE
PH UR STRIP.AUTO: 5.5 [PH] (ref 5–8)
PLATELET # BLD AUTO: 379 K/UL (ref 164–446)
PMV BLD AUTO: 8.9 FL (ref 9–12.9)
POTASSIUM SERPL-SCNC: 4.1 MMOL/L (ref 3.6–5.5)
PROPOXYPH UR QL SCN: NEGATIVE
PROT SERPL-MCNC: 7.1 G/DL (ref 6–8.2)
PROT UR QL STRIP: NEGATIVE MG/DL
PROTHROMBIN TIME: 13.1 SEC (ref 12–14.6)
RBC # BLD AUTO: 5.58 M/UL (ref 4.7–6.1)
RBC UR QL AUTO: NEGATIVE
RH BLD: NORMAL
SCCMEC + MECA PNL NOSE NAA+PROBE: NEGATIVE
SCCMEC + MECA PNL NOSE NAA+PROBE: NEGATIVE
SODIUM SERPL-SCNC: 139 MMOL/L (ref 135–145)
SP GR UR STRIP.AUTO: 1.01
UROBILINOGEN UR STRIP.AUTO-MCNC: 0.2 EU/DL
WBC # BLD AUTO: 8.1 K/UL (ref 4.8–10.8)

## 2024-11-22 PROCEDURE — 85730 THROMBOPLASTIN TIME PARTIAL: CPT

## 2024-11-22 PROCEDURE — 36415 COLL VENOUS BLD VENIPUNCTURE: CPT

## 2024-11-22 PROCEDURE — 86901 BLOOD TYPING SEROLOGIC RH(D): CPT

## 2024-11-22 PROCEDURE — 71046 X-RAY EXAM CHEST 2 VIEWS: CPT

## 2024-11-22 PROCEDURE — 93005 ELECTROCARDIOGRAM TRACING: CPT

## 2024-11-22 PROCEDURE — 87641 MR-STAPH DNA AMP PROBE: CPT

## 2024-11-22 PROCEDURE — 81003 URINALYSIS AUTO W/O SCOPE: CPT

## 2024-11-22 PROCEDURE — 80307 DRUG TEST PRSMV CHEM ANLYZR: CPT

## 2024-11-22 PROCEDURE — 86900 BLOOD TYPING SEROLOGIC ABO: CPT

## 2024-11-22 PROCEDURE — 85025 COMPLETE CBC W/AUTO DIFF WBC: CPT

## 2024-11-22 PROCEDURE — 87640 STAPH A DNA AMP PROBE: CPT

## 2024-11-22 PROCEDURE — 80053 COMPREHEN METABOLIC PANEL: CPT

## 2024-11-22 PROCEDURE — 93010 ELECTROCARDIOGRAM REPORT: CPT | Performed by: INTERNAL MEDICINE

## 2024-11-22 PROCEDURE — 86850 RBC ANTIBODY SCREEN: CPT

## 2024-11-22 PROCEDURE — 83036 HEMOGLOBIN GLYCOSYLATED A1C: CPT

## 2024-11-22 PROCEDURE — 85610 PROTHROMBIN TIME: CPT

## 2024-11-22 RX ORDER — EPINEPHRINE HCL IN 0.9 % NACL 4MG/250ML
0-.5 PLASTIC BAG, INJECTION (ML) INTRAVENOUS CONTINUOUS
Status: DISCONTINUED | OUTPATIENT
Start: 2024-11-25 | End: 2024-11-22 | Stop reason: HOSPADM

## 2024-11-22 RX ORDER — NOREPINEPHRINE BITARTRATE 0.03 MG/ML
0-1 INJECTION, SOLUTION INTRAVENOUS CONTINUOUS
Status: DISCONTINUED | OUTPATIENT
Start: 2024-11-25 | End: 2024-11-22 | Stop reason: HOSPADM

## 2024-11-22 RX ORDER — DEXMEDETOMIDINE HYDROCHLORIDE 4 UG/ML
0-1.5 INJECTION, SOLUTION INTRAVENOUS CONTINUOUS
Status: DISCONTINUED | OUTPATIENT
Start: 2024-11-25 | End: 2024-11-22 | Stop reason: HOSPADM

## 2024-11-23 ENCOUNTER — HOSPITAL ENCOUNTER (OUTPATIENT)
Dept: LAB | Facility: MEDICAL CENTER | Age: 64
End: 2024-11-23
Attending: NURSE PRACTITIONER
Payer: MEDICAID

## 2024-11-23 DIAGNOSIS — I71.21 ANEURYSM OF ASCENDING AORTA WITHOUT RUPTURE (HCC): ICD-10-CM

## 2024-11-23 PROCEDURE — 80307 DRUG TEST PRSMV CHEM ANLYZR: CPT

## 2024-11-23 PROCEDURE — G0480 DRUG TEST DEF 1-7 CLASSES: HCPCS

## 2024-11-25 LAB
AMPHET CTO UR CFM-MCNC: NORMAL NG/ML
BARBITURATES CTO UR CFM-MCNC: NEGATIVE NG/ML
BENZODIAZ CTO UR CFM-MCNC: NEGATIVE NG/ML
CANNABINOIDS CTO UR CFM-MCNC: NORMAL NG/ML
COCAINE CTO UR CFM-MCNC: NEGATIVE NG/ML
CREAT UR-MCNC: 178.8 MG/DL (ref 20–400)
DRUG COMMENT 753798: NORMAL
METHADONE CTO UR CFM-MCNC: NEGATIVE NG/ML
OPIATES CTO UR CFM-MCNC: NEGATIVE NG/ML
PCP CTO UR CFM-MCNC: NEGATIVE NG/ML
PROPOXYPH CTO UR CFM-MCNC: NEGATIVE NG/ML

## 2024-11-28 LAB — THC UR CFM-MCNC: <15 NG/ML

## 2024-12-01 LAB
AMPHET UR CFM-MCNC: <50 NG/ML
MDA UR CFM-MCNC: <200 NG/ML
MDEA UR CFM-MCNC: <200 NG/ML
MDMA UR CFM-MCNC: <200 NG/ML
METHAMPHET UR CFM-MCNC: 985 NG/ML
PHENTERMINE UR CFM-MCNC: <200 NG/ML

## 2024-12-02 ENCOUNTER — TELEPHONE (OUTPATIENT)
Dept: CARDIOTHORACIC SURGERY | Facility: MEDICAL CENTER | Age: 64
End: 2024-12-02
Payer: MEDICAID

## 2024-12-02 DIAGNOSIS — I71.21 ANEURYSM OF ASCENDING AORTA WITHOUT RUPTURE (HCC): ICD-10-CM

## 2024-12-02 NOTE — TELEPHONE ENCOUNTER
Called patient to discuss the results of his repeat urine drug screen with confirmatory test. He was again positive for methamphetamine. He does believe that when he was at a friends house there was some stuff on the counter and he made a sandwich near it and that may be the cause. It was discussed he needs to be clean for two weeks prior to surgery or could have a major stroke during anaesthesia. We discussed that I will place a repeat Urine drug test and he needs to retest in two weeks. He understood and all questions were addressed.

## 2024-12-03 ENCOUNTER — TELEPHONE (OUTPATIENT)
Dept: CARDIOLOGY | Facility: MEDICAL CENTER | Age: 64
End: 2024-12-03
Payer: MEDICAID

## 2024-12-03 NOTE — LETTER
PROCEDURE/SURGERY CLEARANCE FORM      Encounter Date: 12/3/2024    Patient: Donovan Vail  YOB: 1960    CARDIOLOGIST:  Melquiades Tarango M.D.    REFERRING DOCTOR:  No ref. provider found    The following procedure/surgery: Colonoscopy with deep sedation                                            Additional comments:   He can not undergo any procedures until his ascending aortic aneurysm is repaired as recommended by Yann Bernard. Thank you.  JI.               Electronically Signed     MD Signature   Melquiades Tarango M.D.

## 2024-12-03 NOTE — TELEPHONE ENCOUNTER
Last OV: 09/09/2024  Proposed Surgery: Colonoscopy with deep sedation  Surgery Date: 1/8/25  Requesting Office Name: GI Consultant  Fax Number: [phone]  Preference of Location (default is surgery center unless specified by Cardiologist or [facility])  Prior Clearance Addressed: No    Is this a general clearance? YES   Anticoags/Antiplatelets: Other none  Anticoags/Antiplatelet managed by Cardiology? NA   Outstanding Cardiac Imaging : Yes  Other CT-CTA COMPLETE  Clearance to provider to review  Ablation, Cardioversion, Stent, Cardiac Devices, Catheterization, Watchman: Yes  Within the last 6 months. Forward to provider to review.  TAVR/Valve, Mitral Clip, Watchman (including open heart),: N/A   Recent Cardiac Hospitalization: Yes  Date:  09/16/2024            When: Hospitalized in the last 3 months. Forward to provider to review.   History (cardiac history):   Past Medical History:   Diagnosis Date    Arrhythmia 2024    svt    Dental disorder     none per pt    Disorder of thyroid 09/13/2024    polyps on thyroid    Hyperlipidemia     Marijuana use 11/17/2017    Ulnar nerve entrapment, right 10/04/2017           Is this a dental clearance? NO  Ablation, Cardioversion, Watchman, Stents, Cath, Devices within the last 3 months? Yes    If yes- Send dental wait letter, do not forward to provider for review.     TAVR / Valve, Mitral clip within the last 6 months? No  If yes- Send dental wait letter, do not forward to provider for review.     If completing a general clearance, continue per protocol.           Surgical Clearance Letter Sent: No Provider to advise.   **Scan clearance request letter into [facility].**

## 2024-12-04 ENCOUNTER — APPOINTMENT (OUTPATIENT)
Dept: CARDIOLOGY | Facility: MEDICAL CENTER | Age: 64
End: 2024-12-04
Attending: INTERNAL MEDICINE
Payer: MEDICAID

## 2024-12-05 ENCOUNTER — TELEPHONE (OUTPATIENT)
Dept: CARDIOLOGY | Facility: MEDICAL CENTER | Age: 64
End: 2024-12-05
Payer: MEDICAID

## 2024-12-06 NOTE — TELEPHONE ENCOUNTER
Confirmed with pt on 12/06/2024 that procedure date has changed to 01/31/2025. Pt sated that colonoscopy has changed from original date 01/08/2025 to 01/31/2025. New clearance in media (12/5/2024).  Awaiting provider's advise.

## 2024-12-11 ENCOUNTER — OFFICE VISIT (OUTPATIENT)
Dept: CARDIOLOGY | Facility: MEDICAL CENTER | Age: 64
End: 2024-12-11
Attending: INTERNAL MEDICINE
Payer: MEDICAID

## 2024-12-11 VITALS
BODY MASS INDEX: 25.03 KG/M2 | HEIGHT: 74 IN | SYSTOLIC BLOOD PRESSURE: 126 MMHG | RESPIRATION RATE: 16 BRPM | HEART RATE: 76 BPM | OXYGEN SATURATION: 95 % | DIASTOLIC BLOOD PRESSURE: 68 MMHG | WEIGHT: 195 LBS

## 2024-12-11 DIAGNOSIS — Z72.0 CURRENT TOBACCO USE: ICD-10-CM

## 2024-12-11 DIAGNOSIS — E78.00 PURE HYPERCHOLESTEROLEMIA: ICD-10-CM

## 2024-12-11 DIAGNOSIS — I25.83 CORONARY ARTERY DISEASE DUE TO LIPID RICH PLAQUE: ICD-10-CM

## 2024-12-11 DIAGNOSIS — I25.10 CORONARY ARTERY DISEASE DUE TO LIPID RICH PLAQUE: ICD-10-CM

## 2024-12-11 DIAGNOSIS — I71.21 ANEURYSM OF ASCENDING AORTA WITHOUT RUPTURE (HCC): ICD-10-CM

## 2024-12-11 PROCEDURE — 99214 OFFICE O/P EST MOD 30 MIN: CPT | Performed by: INTERNAL MEDICINE

## 2024-12-11 PROCEDURE — 3074F SYST BP LT 130 MM HG: CPT | Performed by: INTERNAL MEDICINE

## 2024-12-11 PROCEDURE — 3078F DIAST BP <80 MM HG: CPT | Performed by: INTERNAL MEDICINE

## 2024-12-11 PROCEDURE — 99211 OFF/OP EST MAY X REQ PHY/QHP: CPT | Performed by: INTERNAL MEDICINE

## 2024-12-11 ASSESSMENT — ENCOUNTER SYMPTOMS
GASTROINTESTINAL NEGATIVE: 1
PALPITATIONS: 0
FOCAL WEAKNESS: 0
NERVOUS/ANXIOUS: 0
BLURRED VISION: 0
EYES NEGATIVE: 1
MUSCULOSKELETAL NEGATIVE: 1
CLAUDICATION: 0
CONSTITUTIONAL NEGATIVE: 1
DIZZINESS: 0
NAUSEA: 0
BRUISES/BLEEDS EASILY: 0
ABDOMINAL PAIN: 0
CHILLS: 0
WEIGHT LOSS: 0
HEADACHES: 0
VOMITING: 0
SHORTNESS OF BREATH: 0
MYALGIAS: 0
WEAKNESS: 0
FEVER: 0
COUGH: 0
RESPIRATORY NEGATIVE: 1
DOUBLE VISION: 0
CARDIOVASCULAR NEGATIVE: 1
PSYCHIATRIC NEGATIVE: 1
NEUROLOGICAL NEGATIVE: 1
DEPRESSION: 0

## 2024-12-11 ASSESSMENT — FIBROSIS 4 INDEX: FIB4 SCORE: 0.89

## 2024-12-11 NOTE — PROGRESS NOTES
Chief Complaint   Patient presents with    Supraventricular Tachycardia (SVT)       Subjective     Donovan Jerrell Vail is a 64 y.o. male who presents today for follow up of ascending aortic aneurysm, coronary artery disease, dyslipidemia, supraventricular tachycardia.    Since the patient's last visit on 09/09/24, he has been doing well clinically from cardiac standpoint. He denies fatigue, chest pain, shortness of breath, palpitations, lower extremity edema, dizziness or syncope. He underwent cardiac catheterization as below and consulted with Yann Bernard who recommended ascending aortic aneurysm repair, however, he has tested methamphetamine. He is still smoking marijuana and chewing tobacco.     Past Medical History:   Diagnosis Date    Arrhythmia 2024    svt    Dental disorder     none per pt    Disorder of thyroid 09/13/2024    polyps on thyroid    Hyperlipidemia     Marijuana use 11/17/2017    Ulnar nerve entrapment, right 10/04/2017     Past Surgical History:   Procedure Laterality Date    INGUINAL HERNIA LAPAROSCOPIC BILATERAL Bilateral 11/20/2017    Procedure: INGUINAL HERNIA LAPAROSCOPIC BILATERAL;  Surgeon: Pradeep West M.D.;  Location: SURGERY [facility];  Service: Vascular    NERVE ULNAR TRANSFER Right 03/2017    SPLIT THICKNESS SKIN GRAFT  1/21/2015    Performed by Raza Cowan M.D. at SURGERY [facility]    OTHER SURGICAL PROCEDURE Right 2014    muscle contracture release    OTHER SURGICAL PROCEDURE Right 2013    necrotizing fascitis    INGUINAL HERNIA REPAIR Right 2005    OTHER SURGICAL PROCEDURE Right [id number]    Total of 14 surgeries due to necrotizing fascitis     Family History   Problem Relation Age of Onset    Hypertension Mother     Arterial Aneurysm Father     Heart Disease Father     Heart Attack Neg Hx      Social History     Socioeconomic History    Marital status:      Spouse name: Not on file    Number of children: Not on file    Years of  "education: Not on file    Highest education level: Not on file   Occupational History    Not on file   Tobacco Use    Smoking status: Former     Current packs/day: 0.00     Average packs/day: 1 pack/day for 47.4 years (47.4 ttl pk-yrs)     Types: Cigarettes     Start date: 1974     Quit date: 2022     Years since quittin.9    Smokeless tobacco: Current     Types: Snuff, Chew   Vaping Use    Vaping status: Former   Substance and Sexual Activity    Alcohol use: No    Drug use: Yes     Types: Inhaled, Marijuana     Comment: occ    Sexual activity: Not on file   Other Topics Concern    Not on file   Social History Narrative    ** Merged History Encounter **          Social Drivers of Health     Financial Resource Strain: Not on file   Food Insecurity: Not on file   Transportation Needs: Not on file   Physical Activity: Not on file   Stress: Not on file   Social Connections: Not on file   Intimate Partner Violence: Not on file   Housing Stability: Not on file     Allergies   Allergen Reactions    Percocet [Oxycodone-Acetaminophen] Vomiting and Nausea     \"it makes me VERY sick\"    Vicodin [Hydrocodone-Acetaminophen] Vomiting and Nausea     \"it makes me VERY sick\"     (Medications reviewed.)  Outpatient Encounter Medications as of 2024   Medication Sig Dispense Refill    metoprolol SR (TOPROL XL) 50 MG TABLET SR 24 HR Take 1 Tablet by mouth 2 times a day. 180 Tablet 3    acetaminophen (TYLENOL) 500 MG Tab Take 1,000 mg by mouth every 6 hours as needed.      ibuprofen (MOTRIN) 200 MG Tab Take 200 mg by mouth every 6 hours as needed.      rosuvastatin (CRESTOR) 5 MG Tab Take 1 Tablet by mouth every evening. 90 Tablet 3     No facility-administered encounter medications on file as of 2024.     Review of Systems   Constitutional: Negative.  Negative for chills, fever, malaise/fatigue and weight loss.   HENT: Negative.  Negative for hearing loss.    Eyes: Negative.  Negative for blurred vision and " "double vision.   Respiratory: Negative.  Negative for cough and shortness of breath.    Cardiovascular: Negative.  Negative for chest pain, palpitations, claudication and leg swelling.   Gastrointestinal: Negative.  Negative for abdominal pain, nausea and vomiting.   Genitourinary: Negative.  Negative for dysuria and urgency.   Musculoskeletal: Negative.  Negative for joint pain and myalgias.   Skin: Negative.  Negative for itching and rash.   Neurological: Negative.  Negative for dizziness, focal weakness, weakness and headaches.   Endo/Heme/Allergies: Negative.  Does not bruise/bleed easily.   Psychiatric/Behavioral: Negative.  Negative for depression. The patient is not nervous/anxious.               Objective     /68 (BP Location: Left arm, Patient Position: Sitting, BP Cuff Size: Adult)   Pulse 76   Resp 16   Ht 1.88 m (6' 2\")   Wt 88.5 kg (195 lb)   SpO2 95%   BMI 25.04 kg/m²     Physical Exam  Constitutional:       Appearance: Normal appearance. He is well-developed and normal weight.   HENT:      Head: Normocephalic and atraumatic.   Neck:      Vascular: No JVD.   Cardiovascular:      Rate and Rhythm: Normal rate and regular rhythm.      Heart sounds: Normal heart sounds.   Pulmonary:      Effort: Pulmonary effort is normal.      Breath sounds: Normal breath sounds.   Abdominal:      General: Bowel sounds are normal.      Palpations: Abdomen is soft.      Comments: No hepatosplenomegaly.   Musculoskeletal:         General: Normal range of motion.   Lymphadenopathy:      Cervical: No cervical adenopathy.   Skin:     General: Skin is warm and dry.   Neurological:      Mental Status: He is alert and oriented to person, place, and time.            CARDIAC STUDIES/PROCEDURES:    CARDIAC CATHETERIZATION CONCLUSIONS by Andrew Vicente (09/16/24)  Mild nonobstructive coronary artery disease.  Dilated thoracic aorta.    CAROTID ULTRASOUND (09/26/24)  Normal bilateral carotid exam.   The bilateral " subclavian and vertebral artery waveforms are antegrade and   normal in character and velocity.   (study result reviewed)     CT OF CHEST (08/12/24)  Fusiform aneurysmal dilation of the ascending aorta, measuring up to 4.9 cm in transverse diameter.     ECHOCARDIOGRAM CONCLUSIONS (09/10/24)  No prior study is available for comparison.   Normal left ventricular systolic function.  The left ventricular ejection fraction is visually estimated to be 60-65%.  Mild mitral regurgitation.  Mild aortic insufficiency.  Mild tricuspid regurgitation.  Right ventricular systolic pressure is estimated to be 25 mmHg.  Mild pulmonic insufficiency.  The ascending aorta is dilated with a diameter of  5.2 cm.  (study result reviewed)     EKG performed on (09/09/24) EKG shows sinus rhythm.      Laboratory results of (07/16/24) Cholesterol profile of 190/190/38/114 mg/dL noted.    ZIO XT REPORT (09/24/24-10/04/24)  Zio study showing predominately sinus rhythm with maximum rate of 207 bpm, minimum rate of 44 bpm, average of 85 bpm.  Atrial fibrillation: None.  Supraventricular tachycardia: 195 episodes of supraventricular tachycardia/atrial tachycardia with fastest interval lasting 2 hours 14 minutes with a max rate of 207 bpm with longest lasting 3 hours 2 minutes  with average rate of 151 bpm noted.    Pauses: None.  Heart block: None.  Ventricular tachycardia: None.  Occasional 3.4% burden of premature atrial contractions, occasional 1.1% couplets, rare triplets.  Rare <1% burden of premature ventricular contractions, rare couplets and triplets.    53 Patient events correlated with sinus rhythm, supraventricular tachycardia.    Assessment & Plan     1. Aneurysm of ascending aorta without rupture (HCC)        2. Coronary artery disease due to lipid rich plaque        3. Pure hypercholesterolemia        4. Current tobacco use            Medical Decision Making: Today's Assessment/Status/Plan:        Ascending aortic aneurysm: He is a 64  year old male with ascending aortic aneurysm, coronary artery disease, dyslipidemia, supraventricular tachycardia.  Coronary artery disease: He is clinically doing well from coronary standpoint. I will continue with current medical care including metoprolol, rosuvastatin.  Hyperlipidemia: He is doing well on statin therapy without myalgia symptoms.  Paroxysmal supraventricular tachycardia: He is clinically doing well without recurrence of his palpitations on beta blockade therapy.  We will continue with current care.    Methamphetamine, marijuana, chewing tobacco use: Lifestyle modification recommended.   Additional information: He works as a foley.     We will follow up in 3 months.    CC Freda Christopher

## 2024-12-18 ENCOUNTER — APPOINTMENT (OUTPATIENT)
Dept: INTERNAL MEDICINE | Facility: OTHER | Age: 64
End: 2024-12-18
Payer: MEDICAID

## 2024-12-19 ENCOUNTER — TELEPHONE (OUTPATIENT)
Dept: CARDIOLOGY | Facility: MEDICAL CENTER | Age: 64
End: 2024-12-19
Payer: MEDICAID

## 2024-12-19 NOTE — TELEPHONE ENCOUNTER
Unable to reach patient on his cellphone number on file and cannot leave  due to being full. Called his daughter's phone number and left  regarding outstanding echo order. SS would like to get echo done first before seeing patient. Will need to reschedule at a later time. Thank you

## 2025-01-06 ENCOUNTER — TELEPHONE (OUTPATIENT)
Dept: CARDIOTHORACIC SURGERY | Facility: MEDICAL CENTER | Age: 65
End: 2025-01-06
Payer: MEDICAID

## 2025-01-06 NOTE — TELEPHONE ENCOUNTER
Patient states he didn't know he needed to get it done.    He was told we cannot schedule him until it is completed and resulted.  He was told he can go to any [facility] lab to complete.  He states he will do it tomorrow.    Call time 4 minutes

## 2025-01-13 ENCOUNTER — HOSPITAL ENCOUNTER (OUTPATIENT)
Facility: MEDICAL CENTER | Age: 65
End: 2025-01-13
Attending: NURSE PRACTITIONER
Payer: MEDICAID

## 2025-01-13 DIAGNOSIS — I71.21 ANEURYSM OF ASCENDING AORTA WITHOUT RUPTURE (HCC): ICD-10-CM

## 2025-01-13 PROCEDURE — 80307 DRUG TEST PRSMV CHEM ANLYZR: CPT

## 2025-01-13 PROCEDURE — G0480 DRUG TEST DEF 1-7 CLASSES: HCPCS

## 2025-01-15 LAB
AMPHET CTO UR CFM-MCNC: NEGATIVE NG/ML
BARBITURATES CTO UR CFM-MCNC: NEGATIVE NG/ML
BENZODIAZ CTO UR CFM-MCNC: NEGATIVE NG/ML
CANNABINOIDS CTO UR CFM-MCNC: NORMAL NG/ML
COCAINE CTO UR CFM-MCNC: NEGATIVE NG/ML
CREAT UR-MCNC: 67.8 MG/DL (ref 20–400)
DRUG COMMENT 753798: NORMAL
METHADONE CTO UR CFM-MCNC: NEGATIVE NG/ML
OPIATES CTO UR CFM-MCNC: NEGATIVE NG/ML
PCP CTO UR CFM-MCNC: NEGATIVE NG/ML
PROPOXYPH CTO UR CFM-MCNC: NEGATIVE NG/ML

## 2025-01-17 LAB — THC UR CFM-MCNC: >500 NG/ML

## 2025-01-20 ENCOUNTER — TELEPHONE (OUTPATIENT)
Dept: CARDIOTHORACIC SURGERY | Facility: MEDICAL CENTER | Age: 65
End: 2025-01-20
Payer: MEDICAID

## 2025-01-20 NOTE — TELEPHONE ENCOUNTER
I called and left  with my call back number to get his surgery rescheduled after his urine drug screen came back negative.

## 2025-01-22 ENCOUNTER — APPOINTMENT (OUTPATIENT)
Dept: ADMISSIONS | Facility: MEDICAL CENTER | Age: 65
End: 2025-01-22
Attending: THORACIC SURGERY (CARDIOTHORACIC VASCULAR SURGERY)
Payer: MEDICAID

## 2025-01-22 ENCOUNTER — TELEPHONE (OUTPATIENT)
Dept: CARDIOLOGY | Facility: MEDICAL CENTER | Age: 65
End: 2025-01-22
Payer: MEDICAID

## 2025-01-22 NOTE — TELEPHONE ENCOUNTER
Last OV: 12.11.2024  Proposed Surgery: Colonoscopy with Deep (Propofol) Sedation   Surgery Date: 03.28.2025  Requesting Office Name: [facility]   Fax Number: [phone]  Preference of Location (default is surgery center unless specified by Cardiologist or CHRISTIAN)  Prior Clearance Addressed: No    Is this a general clearance? YES   Anticoags/Antiplatelets: Other None  Anticoags/Antiplatelet managed by Cardiology? YES    Outstanding Cardiac Imaging : Yes  Echo.   Clearance to provider to review and Other CT-CTA Complete Thoracoabdominal Aorta  Clearance to provider to review  Ablation, Cardioversion, Stent, Cardiac Devices, Catheterization, Watchman: Yes  Within the last 6 months. Forward to provider to review.  TAVR/Valve, Mitral Clip, Watchman (including open heart),: N/A   Recent Cardiac Hospitalization: Yes  Date:  09.16.2024            When: Greater than 3 months since hospitalization   History (cardiac history):   Past Medical History:   Diagnosis Date    Arrhythmia 2024    svt    Dental disorder     none per pt    Disorder of thyroid 09/13/2024    polyps on thyroid    Hyperlipidemia     Marijuana use 11/17/2017    Ulnar nerve entrapment, right 10/04/2017           Is this a dental clearance? NO  Ablation, Cardioversion, Watchman, Stents, Cath, Devices within the last 3 months? No   If yes- Send dental wait letter, do not forward to provider for review.     TAVR / Valve, Mitral clip within the last 6 months? No  If yes- Send dental wait letter, do not forward to provider for review.     If completing a general clearance, continue per protocol.           Surgical Clearance Letter Sent: YES   **Scan clearance request letter into [facility].**

## 2025-01-22 NOTE — LETTER
PROCEDURE/SURGERY CLEARANCE FORM      Encounter Date: 1/22/2025    Patient: Donovan Vail  YOB: 1960    CARDIOLOGIST:  Melquiades Tarango M.D.    REFERRING DOCTOR:  No ref. provider found    The following procedure/surgery: Colonoscopy with Deep (Propofol) Sedation     PROCEDURE/SURGERY CLEARANCE FORM    Date: 1/22/2025   Patient Name: Donovan Vail    Dear Surgeon or Proceduralist,      Thank you for your request for cardiac stratification of our mutual patient Donovan Vail 1960. We have reviewed their [facility] records; and to the best of our understanding this patient has not had stenting, ablation, watchman, cardiothoracic surgery or hospitalization for cardiovascular reasons in the past 6 months.  Donovan Vail has been seen within the past 15 months and is considered to have non-modifiable cardiac risk for this low-risk procedure/surgery. They may proceed from a cardiovascular standpoint and may hold their antiplatelet/anticoagulation as briefly as possible. Please have patient resume this medication when hemodynamically stable to do so.     Aspirin or Prasugrel   - hold 7 days prior to procedure/surgery, resume when hemodynamically stable      Clopidrogrel or Ticagrelor  - hold 7 days for all neurological procedures, hold 5 days prior to all other procedure/surgery,  resume when hemodynamically stable     Warfarin - hold 7 days for all neurological procedures, hold 5 days prior to all other procedure/surgery and coordinate with [facility] ([phone]) INR testing and dose management.      Pradaxa/Xarelto/Eliquis/Savesya - hold 1 day prior to procedure for low bleeding risk procedure, 2 days for high bleeding risk procedure, or consider holding 3 days or longer for patients with reduced kidney function (CrCl <30mL/min) or spinal/cranial surgeries/procedures.      If they have a mechanical heart valve, please coordinate with [facility]  ([phone]) the proper management of their anticoagulant in the periprocedural or perioperative period.      Some patients have higher risk for cardiovascular complications or holding medication. If our patient has had prior complications of holding antiplatelet or anticoagulants in the past and we have seen them after these events, we have addressed these concerns with the patient. They are at an unknown degree of increased risk for recurrent complication.  You may hold anticoagulation/antiplatelets for the procedure or surgery if the benefits of the procedure or surgery outweigh this nonmodifiable risk.      If Donovan Vail 1960 has new symptoms of heart failure decompensation, unstable arrythmia, or angina please reach out and we will assess the patient.      If you have other patient-specific concerns, please feel free to reach out to the patient's cardiologist directly at [phone].     Thank you,       [facility]        Electronically Signed       MD Signature   Melquiades Tarango M.D.

## 2025-01-24 ENCOUNTER — PRE-ADMISSION TESTING (OUTPATIENT)
Dept: ADMISSIONS | Facility: MEDICAL CENTER | Age: 65
End: 2025-01-24
Attending: THORACIC SURGERY (CARDIOTHORACIC VASCULAR SURGERY)
Payer: MEDICAID

## 2025-01-27 ENCOUNTER — HOSPITAL ENCOUNTER (OUTPATIENT)
Dept: RADIOLOGY | Facility: MEDICAL CENTER | Age: 65
End: 2025-01-27
Attending: THORACIC SURGERY (CARDIOTHORACIC VASCULAR SURGERY) | Admitting: THORACIC SURGERY (CARDIOTHORACIC VASCULAR SURGERY)
Payer: MEDICAID

## 2025-01-27 ENCOUNTER — PRE-ADMISSION TESTING (OUTPATIENT)
Dept: ADMISSIONS | Facility: MEDICAL CENTER | Age: 65
End: 2025-01-27
Attending: THORACIC SURGERY (CARDIOTHORACIC VASCULAR SURGERY)
Payer: MEDICAID

## 2025-01-27 ENCOUNTER — TELEPHONE (OUTPATIENT)
Dept: CARDIOTHORACIC SURGERY | Facility: MEDICAL CENTER | Age: 65
End: 2025-01-27

## 2025-01-27 DIAGNOSIS — Z01.810 PRE-OPERATIVE CARDIOVASCULAR EXAMINATION: ICD-10-CM

## 2025-01-27 DIAGNOSIS — Z01.811 PRE-OPERATIVE RESPIRATORY EXAMINATION: ICD-10-CM

## 2025-01-27 DIAGNOSIS — Z01.812 PRE-OPERATIVE LABORATORY EXAMINATION: ICD-10-CM

## 2025-01-27 LAB
ABO GROUP BLD: NORMAL
ALBUMIN SERPL BCP-MCNC: 3.9 G/DL (ref 3.2–4.9)
ALBUMIN/GLOB SERPL: 1.4 G/DL
ALP SERPL-CCNC: 85 U/L (ref 30–99)
ALT SERPL-CCNC: 11 U/L (ref 2–50)
AMPHET UR QL SCN: NEGATIVE
ANION GAP SERPL CALC-SCNC: 11 MMOL/L (ref 7–16)
APPEARANCE UR: CLEAR
APTT PPP: 28.9 SEC (ref 24.7–36)
AST SERPL-CCNC: 8 U/L (ref 12–45)
BACTERIA #/AREA URNS HPF: NORMAL /HPF
BARBITURATES UR QL SCN: NEGATIVE
BASOPHILS # BLD AUTO: 0.8 % (ref 0–1.8)
BASOPHILS # BLD: 0.05 K/UL (ref 0–0.12)
BENZODIAZ UR QL SCN: NEGATIVE
BILIRUB SERPL-MCNC: 0.3 MG/DL (ref 0.1–1.5)
BILIRUB UR QL STRIP.AUTO: NEGATIVE
BLD GP AB SCN SERPL QL: NORMAL
BUN SERPL-MCNC: 20 MG/DL (ref 8–22)
BZE UR QL SCN: NEGATIVE
CALCIUM ALBUM COR SERPL-MCNC: 8.9 MG/DL (ref 8.5–10.5)
CALCIUM SERPL-MCNC: 8.8 MG/DL (ref 8.5–10.5)
CANNABINOIDS UR QL SCN: NEGATIVE
CASTS URNS QL MICRO: NORMAL /LPF (ref 0–2)
CHLORIDE SERPL-SCNC: 103 MMOL/L (ref 96–112)
CO2 SERPL-SCNC: 21 MMOL/L (ref 20–33)
COLOR UR: YELLOW
CREAT SERPL-MCNC: 1.08 MG/DL (ref 0.5–1.4)
EKG IMPRESSION: NORMAL
EOSINOPHIL # BLD AUTO: 0.1 K/UL (ref 0–0.51)
EOSINOPHIL NFR BLD: 1.7 % (ref 0–6.9)
EPITHELIAL CELLS 1715: NORMAL /HPF (ref 0–5)
ERYTHROCYTE [DISTWIDTH] IN BLOOD BY AUTOMATED COUNT: 47.3 FL (ref 35.9–50)
EST. AVERAGE GLUCOSE BLD GHB EST-MCNC: 126 MG/DL
FENTANYL UR QL: NEGATIVE
GFR SERPLBLD CREATININE-BSD FMLA CKD-EPI: 77 ML/MIN/1.73 M 2
GLOBULIN SER CALC-MCNC: 2.7 G/DL (ref 1.9–3.5)
GLUCOSE SERPL-MCNC: 101 MG/DL (ref 65–99)
GLUCOSE UR STRIP.AUTO-MCNC: NEGATIVE MG/DL
HBA1C MFR BLD: 6 % (ref 4–5.6)
HCT VFR BLD AUTO: 45.8 % (ref 42–52)
HGB BLD-MCNC: 14.9 G/DL (ref 14–18)
IMM GRANULOCYTES # BLD AUTO: 0.02 K/UL (ref 0–0.11)
IMM GRANULOCYTES NFR BLD AUTO: 0.3 % (ref 0–0.9)
INR PPP: 1 (ref 0.87–1.13)
KETONES UR STRIP.AUTO-MCNC: NEGATIVE MG/DL
LEUKOCYTE ESTERASE UR QL STRIP.AUTO: NEGATIVE
LYMPHOCYTES # BLD AUTO: 2.01 K/UL (ref 1–4.8)
LYMPHOCYTES NFR BLD: 34 % (ref 22–41)
MCH RBC QN AUTO: 28 PG (ref 27–33)
MCHC RBC AUTO-ENTMCNC: 32.5 G/DL (ref 32.3–36.5)
MCV RBC AUTO: 85.9 FL (ref 81.4–97.8)
METHADONE UR QL SCN: NEGATIVE
MICRO URNS: ABNORMAL
MONOCYTES # BLD AUTO: 0.48 K/UL (ref 0–0.85)
MONOCYTES NFR BLD AUTO: 8.1 % (ref 0–13.4)
NEUTROPHILS # BLD AUTO: 3.25 K/UL (ref 1.82–7.42)
NEUTROPHILS NFR BLD: 55.1 % (ref 44–72)
NITRITE UR QL STRIP.AUTO: NEGATIVE
NRBC # BLD AUTO: 0 K/UL
NRBC BLD-RTO: 0 /100 WBC (ref 0–0.2)
OPIATES UR QL SCN: NEGATIVE
OXYCODONE UR QL SCN: NEGATIVE
PCP UR QL SCN: NEGATIVE
PH UR STRIP.AUTO: 6 [PH] (ref 5–8)
PLATELET # BLD AUTO: 281 K/UL (ref 164–446)
PMV BLD AUTO: 9.1 FL (ref 9–12.9)
POTASSIUM SERPL-SCNC: 4.4 MMOL/L (ref 3.6–5.5)
PROPOXYPH UR QL SCN: NEGATIVE
PROT SERPL-MCNC: 6.6 G/DL (ref 6–8.2)
PROT UR QL STRIP: 100 MG/DL
PROTHROMBIN TIME: 13.2 SEC (ref 12–14.6)
RBC # BLD AUTO: 5.33 M/UL (ref 4.7–6.1)
RBC # URNS HPF: NORMAL /HPF (ref 0–2)
RBC UR QL AUTO: NEGATIVE
RH BLD: NORMAL
SCCMEC + MECA PNL NOSE NAA+PROBE: NEGATIVE
SCCMEC + MECA PNL NOSE NAA+PROBE: NEGATIVE
SODIUM SERPL-SCNC: 135 MMOL/L (ref 135–145)
SP GR UR STRIP.AUTO: 1.02
UROBILINOGEN UR STRIP.AUTO-MCNC: 1 EU/DL
WBC # BLD AUTO: 5.9 K/UL (ref 4.8–10.8)
WBC #/AREA URNS HPF: NORMAL /HPF

## 2025-01-27 PROCEDURE — 80053 COMPREHEN METABOLIC PANEL: CPT

## 2025-01-27 PROCEDURE — 85730 THROMBOPLASTIN TIME PARTIAL: CPT

## 2025-01-27 PROCEDURE — 85610 PROTHROMBIN TIME: CPT

## 2025-01-27 PROCEDURE — 86901 BLOOD TYPING SEROLOGIC RH(D): CPT

## 2025-01-27 PROCEDURE — 86850 RBC ANTIBODY SCREEN: CPT

## 2025-01-27 PROCEDURE — 83036 HEMOGLOBIN GLYCOSYLATED A1C: CPT

## 2025-01-27 PROCEDURE — 93010 ELECTROCARDIOGRAM REPORT: CPT | Performed by: INTERNAL MEDICINE

## 2025-01-27 PROCEDURE — 86900 BLOOD TYPING SEROLOGIC ABO: CPT

## 2025-01-27 PROCEDURE — 85025 COMPLETE CBC W/AUTO DIFF WBC: CPT

## 2025-01-27 PROCEDURE — 36415 COLL VENOUS BLD VENIPUNCTURE: CPT

## 2025-01-27 PROCEDURE — 87641 MR-STAPH DNA AMP PROBE: CPT

## 2025-01-27 PROCEDURE — 71046 X-RAY EXAM CHEST 2 VIEWS: CPT

## 2025-01-27 PROCEDURE — 81001 URINALYSIS AUTO W/SCOPE: CPT

## 2025-01-27 PROCEDURE — 93005 ELECTROCARDIOGRAM TRACING: CPT | Mod: TC

## 2025-01-27 PROCEDURE — 87640 STAPH A DNA AMP PROBE: CPT

## 2025-01-27 PROCEDURE — 80307 DRUG TEST PRSMV CHEM ANLYZR: CPT

## 2025-01-27 RX ORDER — METHADONE HYDROCHLORIDE 10 MG/ML
20 INJECTION, SOLUTION INTRAMUSCULAR; INTRAVENOUS; SUBCUTANEOUS ONCE
Status: COMPLETED | OUTPATIENT
Start: 2025-01-28 | End: 2025-01-28

## 2025-01-27 RX ORDER — NOREPINEPHRINE BITARTRATE 0.03 MG/ML
0-1 INJECTION, SOLUTION INTRAVENOUS CONTINUOUS
Status: DISCONTINUED | OUTPATIENT
Start: 2025-01-28 | End: 2025-01-28

## 2025-01-27 RX ORDER — DEXMEDETOMIDINE HYDROCHLORIDE 4 UG/ML
0-1.5 INJECTION, SOLUTION INTRAVENOUS CONTINUOUS
Status: DISCONTINUED | OUTPATIENT
Start: 2025-01-28 | End: 2025-01-28

## 2025-01-27 RX ORDER — EPINEPHRINE HCL IN 0.9 % NACL 4MG/250ML
0-.5 PLASTIC BAG, INJECTION (ML) INTRAVENOUS CONTINUOUS
Status: DISCONTINUED | OUTPATIENT
Start: 2025-01-28 | End: 2025-01-28

## 2025-01-27 ASSESSMENT — FIBROSIS 4 INDEX: FIB4 SCORE: 0.89

## 2025-01-27 NOTE — TELEPHONE ENCOUNTER
Reviewed that PAT was completed, reviewed results.    I left  to ensure he knows check in time, location, no food after midnight, and only water between midnight and check in at 0515, my call back number was provided.

## 2025-01-28 ENCOUNTER — APPOINTMENT (OUTPATIENT)
Dept: CARDIOLOGY | Facility: MEDICAL CENTER | Age: 65
DRG: 220 | End: 2025-01-28
Attending: ANESTHESIOLOGY
Payer: MEDICAID

## 2025-01-28 ENCOUNTER — ANESTHESIA EVENT (OUTPATIENT)
Dept: SURGERY | Facility: MEDICAL CENTER | Age: 65
End: 2025-01-28
Payer: MEDICAID

## 2025-01-28 ENCOUNTER — ANESTHESIA (OUTPATIENT)
Dept: SURGERY | Facility: MEDICAL CENTER | Age: 65
End: 2025-01-28
Payer: MEDICAID

## 2025-01-28 ENCOUNTER — APPOINTMENT (OUTPATIENT)
Dept: RADIOLOGY | Facility: MEDICAL CENTER | Age: 65
DRG: 220 | End: 2025-01-28
Attending: THORACIC SURGERY (CARDIOTHORACIC VASCULAR SURGERY)
Payer: MEDICAID

## 2025-01-28 ENCOUNTER — APPOINTMENT (OUTPATIENT)
Dept: RADIOLOGY | Facility: MEDICAL CENTER | Age: 65
DRG: 220 | End: 2025-01-28
Payer: MEDICAID

## 2025-01-28 ENCOUNTER — HOSPITAL ENCOUNTER (INPATIENT)
Facility: MEDICAL CENTER | Age: 65
End: 2025-01-28
Attending: THORACIC SURGERY (CARDIOTHORACIC VASCULAR SURGERY) | Admitting: THORACIC SURGERY (CARDIOTHORACIC VASCULAR SURGERY)
Payer: MEDICAID

## 2025-01-28 DIAGNOSIS — G89.18 ACUTE POST-OPERATIVE PAIN: ICD-10-CM

## 2025-01-28 DIAGNOSIS — Z98.890 S/P ASCENDING AORTIC ANEURYSM REPAIR: ICD-10-CM

## 2025-01-28 DIAGNOSIS — Z95.2 S/P AVR (AORTIC VALVE REPLACEMENT): ICD-10-CM

## 2025-01-28 DIAGNOSIS — Z86.79 S/P ASCENDING AORTIC ANEURYSM REPAIR: ICD-10-CM

## 2025-01-28 PROBLEM — F15.10 AMPHETAMINE ABUSE (HCC): Status: ACTIVE | Noted: 2025-01-28

## 2025-01-28 PROBLEM — Z99.11 ENCOUNTER FOR WEANING FROM VENTILATOR (HCC): Status: ACTIVE | Noted: 2025-01-28

## 2025-01-28 LAB
ACT BLD: 129 SEC (ref 74–137)
ACT BLD: 135 SEC (ref 74–137)
ACT BLD: 550 SEC (ref 74–137)
ACT BLD: 636 SEC (ref 74–137)
ACT BLD: 723 SEC (ref 74–137)
ACT BLD: 809 SEC (ref 74–137)
ACT BLD: 941 SEC (ref 74–137)
ACT BLD: 959 SEC (ref 74–137)
APTT PPP: 36.2 SEC (ref 24.7–36)
ARTERIAL PATENCY WRIST A: ABNORMAL
BARCODED ABORH UBTYP: 600
BARCODED ABORH UBTYP: 600
BARCODED PRD CODE UBPRD: NORMAL
BARCODED PRD CODE UBPRD: NORMAL
BARCODED UNIT NUM UBUNT: NORMAL
BARCODED UNIT NUM UBUNT: NORMAL
BASE EXCESS BLDA CALC-SCNC: -1 MMOL/L (ref -4–3)
BASE EXCESS BLDA CALC-SCNC: -4 MMOL/L (ref -4–3)
BASE EXCESS BLDA CALC-SCNC: -5 MMOL/L (ref -4–3)
BASE EXCESS BLDA CALC-SCNC: -7 MMOL/L (ref -4–3)
BASE EXCESS BLDA CALC-SCNC: 0 MMOL/L (ref -4–3)
BASE EXCESS BLDA CALC-SCNC: 1 MMOL/L (ref -4–3)
BASE EXCESS BLDA CALC-SCNC: 2 MMOL/L (ref -4–3)
BASE EXCESS BLDA CALC-SCNC: 3 MMOL/L (ref -4–3)
BASE EXCESS BLDA CALC-SCNC: 7 MMOL/L (ref -4–3)
BASE EXCESS BLDV CALC-SCNC: -3 MMOL/L (ref -2–3)
BODY TEMPERATURE: ABNORMAL DEGREES
CA-I BLD ISE-SCNC: 1.03 MMOL/L (ref 1.1–1.3)
CA-I BLD ISE-SCNC: 1.03 MMOL/L (ref 1.1–1.3)
CA-I BLD ISE-SCNC: 1.09 MMOL/L (ref 1.1–1.3)
CA-I BLD ISE-SCNC: 1.12 MMOL/L (ref 1.1–1.3)
CA-I BLD ISE-SCNC: 1.13 MMOL/L (ref 1.1–1.3)
CA-I BLD ISE-SCNC: 1.16 MMOL/L (ref 1.1–1.3)
CA-I BLD ISE-SCNC: 1.19 MMOL/L (ref 1.1–1.3)
CA-I BLD ISE-SCNC: 1.22 MMOL/L (ref 1.1–1.3)
CA-I BLD ISE-SCNC: 1.25 MMOL/L (ref 1.1–1.3)
CA-I BLD ISE-SCNC: 1.26 MMOL/L (ref 1.1–1.3)
CA-I SERPL-SCNC: 1.1 MMOL/L (ref 1.1–1.3)
CO2 BLDA-SCNC: 22 MMOL/L (ref 32–48)
CO2 BLDA-SCNC: 22 MMOL/L (ref 32–48)
CO2 BLDA-SCNC: 23 MMOL/L (ref 32–48)
CO2 BLDA-SCNC: 25 MMOL/L (ref 32–48)
CO2 BLDA-SCNC: 27 MMOL/L (ref 32–48)
CO2 BLDA-SCNC: 27 MMOL/L (ref 32–48)
CO2 BLDA-SCNC: 28 MMOL/L (ref 32–48)
CO2 BLDA-SCNC: 29 MMOL/L (ref 32–48)
CO2 BLDA-SCNC: 31 MMOL/L (ref 32–48)
CO2 BLDA-SCNC: 33 MMOL/L (ref 32–48)
CO2 BLDA-SCNC: 35 MMOL/L (ref 32–48)
CO2 BLDA-SCNC: 37 MMOL/L (ref 32–48)
CO2 BLDV-SCNC: 27 MMOL/L (ref 20–33)
COMPONENT R 8504R: NORMAL
COMPONENT R 8504R: NORMAL
DELSYS IDSYS: ABNORMAL
EKG IMPRESSION: NORMAL
END TIDAL CARBON DIOXIDE IECO2: 21 MMHG
END TIDAL CARBON DIOXIDE IECO2: 27 MMHG
END TIDAL CARBON DIOXIDE IECO2: 27 MMHG
GLUCOSE BLD STRIP.AUTO-MCNC: 105 MG/DL (ref 65–99)
GLUCOSE BLD STRIP.AUTO-MCNC: 116 MG/DL (ref 65–99)
GLUCOSE BLD STRIP.AUTO-MCNC: 143 MG/DL (ref 65–99)
GLUCOSE BLD STRIP.AUTO-MCNC: 154 MG/DL (ref 65–99)
GLUCOSE BLD STRIP.AUTO-MCNC: 161 MG/DL (ref 65–99)
GLUCOSE BLD STRIP.AUTO-MCNC: 176 MG/DL (ref 65–99)
GLUCOSE BLD STRIP.AUTO-MCNC: 94 MG/DL (ref 65–99)
HCO3 BLDA-SCNC: 20.7 MMOL/L (ref 21–28)
HCO3 BLDA-SCNC: 21.2 MMOL/L (ref 21–28)
HCO3 BLDA-SCNC: 21.3 MMOL/L (ref 21–28)
HCO3 BLDA-SCNC: 24 MMOL/L (ref 21–28)
HCO3 BLDA-SCNC: 25.6 MMOL/L (ref 21–28)
HCO3 BLDA-SCNC: 25.9 MMOL/L (ref 21–28)
HCO3 BLDA-SCNC: 26.2 MMOL/L (ref 21–28)
HCO3 BLDA-SCNC: 26.4 MMOL/L (ref 21–28)
HCO3 BLDA-SCNC: 27.7 MMOL/L (ref 21–28)
HCO3 BLDA-SCNC: 27.9 MMOL/L (ref 21–28)
HCO3 BLDA-SCNC: 27.9 MMOL/L (ref 21–28)
HCO3 BLDA-SCNC: 31.3 MMOL/L (ref 21–28)
HCO3 BLDA-SCNC: 32.6 MMOL/L (ref 21–28)
HCO3 BLDA-SCNC: 35 MMOL/L (ref 21–28)
HCO3 BLDV-SCNC: 24.9 MMOL/L (ref 22–29)
HCT VFR BLD AUTO: 39.5 % (ref 42–52)
HCT VFR BLD CALC: 30 % (ref 42–52)
HCT VFR BLD CALC: 32 % (ref 42–52)
HCT VFR BLD CALC: 32 % (ref 42–52)
HCT VFR BLD CALC: 33 % (ref 42–52)
HCT VFR BLD CALC: 33 % (ref 42–52)
HCT VFR BLD CALC: 35 % (ref 42–52)
HCT VFR BLD CALC: 35 % (ref 42–52)
HCT VFR BLD CALC: 36 % (ref 42–52)
HCT VFR BLD CALC: 38 % (ref 42–52)
HCT VFR BLD CALC: 42 % (ref 42–52)
HCT VFR BLD CALC: 47 % (ref 42–52)
HGB BLD-MCNC: 10.2 G/DL (ref 14–18)
HGB BLD-MCNC: 10.9 G/DL (ref 14–18)
HGB BLD-MCNC: 10.9 G/DL (ref 14–18)
HGB BLD-MCNC: 11.2 G/DL (ref 14–18)
HGB BLD-MCNC: 11.2 G/DL (ref 14–18)
HGB BLD-MCNC: 11.9 G/DL (ref 14–18)
HGB BLD-MCNC: 11.9 G/DL (ref 14–18)
HGB BLD-MCNC: 12.2 G/DL (ref 14–18)
HGB BLD-MCNC: 12.9 G/DL (ref 14–18)
HGB BLD-MCNC: 13.5 G/DL (ref 14–18)
HGB BLD-MCNC: 14.3 G/DL (ref 14–18)
HGB BLD-MCNC: 16 G/DL (ref 14–18)
HOROWITZ INDEX BLDA+IHG-RTO: 142 MM[HG]
HOROWITZ INDEX BLDA+IHG-RTO: 222 MM[HG]
HOROWITZ INDEX BLDA+IHG-RTO: 252 MM[HG]
HOROWITZ INDEX BLDA+IHG-RTO: 307 MM[HG]
INR PPP: 1.44 (ref 0.87–1.13)
LACTATE BLD-SCNC: 0.9 MMOL/L (ref 0.5–2)
LACTATE BLD-SCNC: 1.6 MMOL/L (ref 0.5–2)
LACTATE BLD-SCNC: 1.8 MMOL/L (ref 0.5–2)
MAGNESIUM SERPL-MCNC: 2.4 MG/DL (ref 1.5–2.5)
MAGNESIUM SERPL-MCNC: 2.5 MG/DL (ref 1.5–2.5)
MODE IMODE: ABNORMAL
O2/TOTAL GAS SETTING VFR VENT: 100 %
O2/TOTAL GAS SETTING VFR VENT: 30 %
O2/TOTAL GAS SETTING VFR VENT: 50 %
O2/TOTAL GAS SETTING VFR VENT: 50 %
PATHOLOGY CONSULT NOTE: NORMAL
PCO2 BLDA: 31.2 MMHG (ref 32–48)
PCO2 BLDA: 33.4 MMHG (ref 32–48)
PCO2 BLDA: 38.6 MMHG (ref 32–48)
PCO2 BLDA: 40.7 MMHG (ref 32–48)
PCO2 BLDA: 42.9 MMHG (ref 32–48)
PCO2 BLDA: 43.4 MMHG (ref 32–48)
PCO2 BLDA: 44.9 MMHG (ref 32–48)
PCO2 BLDA: 47.3 MMHG (ref 32–48)
PCO2 BLDA: 57.6 MMHG (ref 32–48)
PCO2 BLDA: 66.6 MMHG (ref 32–48)
PCO2 BLDA: 73.4 MMHG (ref 32–48)
PCO2 BLDA: 76 MMHG (ref 32–48)
PCO2 BLDA: 84 MMHG (ref 32–48)
PCO2 BLDA: 88.4 MMHG (ref 32–48)
PCO2 BLDV: 59.9 MMHG (ref 38–54)
PCO2 TEMP ADJ BLDA: 29.4 MMHG (ref 32–48)
PCO2 TEMP ADJ BLDA: 31.2 MMHG (ref 32–48)
PCO2 TEMP ADJ BLDA: 37.2 MMHG (ref 32–48)
PCO2 TEMP ADJ BLDA: 37.3 MMHG (ref 32–48)
PCO2 TEMP ADJ BLDA: 37.8 MMHG (ref 32–48)
PCO2 TEMP ADJ BLDA: 38.8 MMHG (ref 32–48)
PCO2 TEMP ADJ BLDA: 40.9 MMHG (ref 32–48)
PCO2 TEMP ADJ BLDA: 44.9 MMHG (ref 32–48)
PCO2 TEMP ADJ BLDA: 45.3 MMHG (ref 32–48)
PCO2 TEMP ADJ BLDA: 49.7 MMHG (ref 32–48)
PCO2 TEMP ADJ BLDA: 51.9 MMHG (ref 32–48)
PCO2 TEMP ADJ BLDA: 55.1 MMHG (ref 32–48)
PCO2 TEMP ADJ BLDA: 67.4 MMHG (ref 32–48)
PCO2 TEMP ADJ BLDV: 55.8 MMHG (ref 38–54)
PEEP END EXPIRATORY PRESSURE IPEEP: 8 CMH20
PERCENT MINUTE VOLUME IPMV: 100
PERCENT MINUTE VOLUME IPMV: 130
PERCENT MINUTE VOLUME IPMV: 160
PERCENT MINUTE VOLUME IPMV: 160
PH BLDA: 7.11 [PH] (ref 7.35–7.45)
PH BLDA: 7.15 [PH] (ref 7.35–7.45)
PH BLDA: 7.2 [PH] (ref 7.35–7.45)
PH BLDA: 7.25 [PH] (ref 7.35–7.45)
PH BLDA: 7.28 [PH] (ref 7.35–7.45)
PH BLDA: 7.29 [PH] (ref 7.35–7.45)
PH BLDA: 7.29 [PH] (ref 7.35–7.45)
PH BLDA: 7.3 [PH] (ref 7.35–7.45)
PH BLDA: 7.35 [PH] (ref 7.35–7.45)
PH BLDA: 7.38 [PH] (ref 7.35–7.45)
PH BLDA: 7.39 [PH] (ref 7.35–7.45)
PH BLDA: 7.4 [PH] (ref 7.35–7.45)
PH BLDA: 7.5 [PH] (ref 7.35–7.45)
PH BLDA: 7.52 [PH] (ref 7.35–7.45)
PH BLDV: 7.23 [PH] (ref 7.31–7.45)
PH TEMP ADJ BLDA: 7.19 [PH] (ref 7.35–7.45)
PH TEMP ADJ BLDA: 7.26 [PH] (ref 7.35–7.45)
PH TEMP ADJ BLDA: 7.26 [PH] (ref 7.35–7.45)
PH TEMP ADJ BLDA: 7.3 [PH] (ref 7.35–7.45)
PH TEMP ADJ BLDA: 7.35 [PH] (ref 7.35–7.45)
PH TEMP ADJ BLDA: 7.36 [PH] (ref 7.35–7.45)
PH TEMP ADJ BLDA: 7.41 [PH] (ref 7.35–7.45)
PH TEMP ADJ BLDA: 7.46 [PH] (ref 7.35–7.45)
PH TEMP ADJ BLDA: 7.49 [PH] (ref 7.35–7.45)
PH TEMP ADJ BLDA: 7.52 [PH] (ref 7.35–7.45)
PH TEMP ADJ BLDA: 7.54 [PH] (ref 7.35–7.45)
PH TEMP ADJ BLDV: 7.25 [PH] (ref 7.31–7.45)
PLATELET # BLD AUTO: 156 K/UL (ref 164–446)
PO2 BLDA: 111 MMHG (ref 83–108)
PO2 BLDA: 126 MMHG (ref 83–108)
PO2 BLDA: 236 MMHG (ref 83–108)
PO2 BLDA: 252 MMHG (ref 83–108)
PO2 BLDA: 299 MMHG (ref 83–108)
PO2 BLDA: 313 MMHG (ref 83–108)
PO2 BLDA: 323 MMHG (ref 83–108)
PO2 BLDA: 327 MMHG (ref 83–108)
PO2 BLDA: 365 MMHG (ref 83–108)
PO2 BLDA: 371 MMHG (ref 83–108)
PO2 BLDA: 404 MMHG (ref 83–108)
PO2 BLDA: 49 MMHG (ref 83–108)
PO2 BLDA: 71 MMHG (ref 83–108)
PO2 BLDA: 92 MMHG (ref 83–108)
PO2 BLDV: 39 MMHG (ref 23–48)
PO2 TEMP ADJ BLDA: 100 MMHG (ref 83–108)
PO2 TEMP ADJ BLDA: 208 MMHG (ref 83–108)
PO2 TEMP ADJ BLDA: 246 MMHG (ref 83–108)
PO2 TEMP ADJ BLDA: 26 MMHG (ref 83–108)
PO2 TEMP ADJ BLDA: 261 MMHG (ref 83–108)
PO2 TEMP ADJ BLDA: 273 MMHG (ref 83–108)
PO2 TEMP ADJ BLDA: 294 MMHG (ref 83–108)
PO2 TEMP ADJ BLDA: 349 MMHG (ref 83–108)
PO2 TEMP ADJ BLDA: 365 MMHG (ref 83–108)
PO2 TEMP ADJ BLDA: 399 MMHG (ref 83–108)
PO2 TEMP ADJ BLDA: 59 MMHG (ref 83–108)
PO2 TEMP ADJ BLDA: 70 MMHG (ref 83–108)
PO2 TEMP ADJ BLDA: 89 MMHG (ref 83–108)
PO2 TEMP ADJ BLDV: 35 MMHG (ref 23–48)
POTASSIUM BLD-SCNC: 4.1 MMOL/L (ref 3.6–5.5)
POTASSIUM BLD-SCNC: 4.2 MMOL/L (ref 3.6–5.5)
POTASSIUM BLD-SCNC: 4.3 MMOL/L (ref 3.6–5.5)
POTASSIUM BLD-SCNC: 4.5 MMOL/L (ref 3.6–5.5)
POTASSIUM BLD-SCNC: 4.7 MMOL/L (ref 3.6–5.5)
POTASSIUM BLD-SCNC: 4.7 MMOL/L (ref 3.6–5.5)
POTASSIUM BLD-SCNC: 4.8 MMOL/L (ref 3.6–5.5)
POTASSIUM BLD-SCNC: 4.9 MMOL/L (ref 3.6–5.5)
POTASSIUM BLD-SCNC: 5.1 MMOL/L (ref 3.6–5.5)
POTASSIUM SERPL-SCNC: 4.1 MMOL/L (ref 3.6–5.5)
POTASSIUM SERPL-SCNC: 4.3 MMOL/L (ref 3.6–5.5)
POTASSIUM SERPL-SCNC: 4.7 MMOL/L (ref 3.6–5.5)
PRODUCT TYPE UPROD: NORMAL
PRODUCT TYPE UPROD: NORMAL
PROTHROMBIN TIME: 17.6 SEC (ref 12–14.6)
SAO2 % BLDA: 100 % (ref 93–99)
SAO2 % BLDA: 78 % (ref 93–99)
SAO2 % BLDA: 96 % (ref 93–99)
SAO2 % BLDA: 97 % (ref 93–99)
SAO2 % BLDA: 98 % (ref 93–99)
SAO2 % BLDA: 98 % (ref 93–99)
SAO2 % BLDV: 62 % (ref 60–85)
SODIUM BLD-SCNC: 135 MMOL/L (ref 135–145)
SODIUM BLD-SCNC: 136 MMOL/L (ref 135–145)
SODIUM BLD-SCNC: 137 MMOL/L (ref 135–145)
SODIUM BLD-SCNC: 137 MMOL/L (ref 135–145)
SODIUM BLD-SCNC: 138 MMOL/L (ref 135–145)
SODIUM BLD-SCNC: 138 MMOL/L (ref 135–145)
SODIUM BLD-SCNC: 139 MMOL/L (ref 135–145)
SODIUM BLD-SCNC: 139 MMOL/L (ref 135–145)
SODIUM BLD-SCNC: 140 MMOL/L (ref 135–145)
SODIUM BLD-SCNC: 140 MMOL/L (ref 135–145)
SODIUM BLD-SCNC: 142 MMOL/L (ref 135–145)
SODIUM BLD-SCNC: 142 MMOL/L (ref 135–145)
SPECIMEN DRAWN FROM PATIENT: ABNORMAL
UNIT STATUS USTAT: NORMAL
UNIT STATUS USTAT: NORMAL

## 2025-01-28 PROCEDURE — 160048 HCHG OR STATISTICAL LEVEL 1-5: Performed by: THORACIC SURGERY (CARDIOTHORACIC VASCULAR SURGERY)

## 2025-01-28 PROCEDURE — 93005 ELECTROCARDIOGRAM TRACING: CPT | Mod: TC

## 2025-01-28 PROCEDURE — 700105 HCHG RX REV CODE 258: Performed by: ANESTHESIOLOGY

## 2025-01-28 PROCEDURE — 82330 ASSAY OF CALCIUM: CPT | Mod: 91

## 2025-01-28 PROCEDURE — 85049 AUTOMATED PLATELET COUNT: CPT

## 2025-01-28 PROCEDURE — C9248 INJ, CLEVIDIPINE BUTYRATE: HCPCS | Mod: JZ | Performed by: ANESTHESIOLOGY

## 2025-01-28 PROCEDURE — C1729 CATH, DRAINAGE: HCPCS | Performed by: THORACIC SURGERY (CARDIOTHORACIC VASCULAR SURGERY)

## 2025-01-28 PROCEDURE — 85347 COAGULATION TIME ACTIVATED: CPT

## 2025-01-28 PROCEDURE — 84295 ASSAY OF SERUM SODIUM: CPT | Mod: 91

## 2025-01-28 PROCEDURE — 700111 HCHG RX REV CODE 636 W/ 250 OVERRIDE (IP)

## 2025-01-28 PROCEDURE — 93010 ELECTROCARDIOGRAM REPORT: CPT | Performed by: INTERNAL MEDICINE

## 2025-01-28 PROCEDURE — C1713 ANCHOR/SCREW BN/BN,TIS/BN: HCPCS | Performed by: THORACIC SURGERY (CARDIOTHORACIC VASCULAR SURGERY)

## 2025-01-28 PROCEDURE — 700101 HCHG RX REV CODE 250

## 2025-01-28 PROCEDURE — B24BZZ4 ULTRASONOGRAPHY OF HEART WITH AORTA, TRANSESOPHAGEAL: ICD-10-PCS | Performed by: ANESTHESIOLOGY

## 2025-01-28 PROCEDURE — C1894 INTRO/SHEATH, NON-LASER: HCPCS | Performed by: THORACIC SURGERY (CARDIOTHORACIC VASCULAR SURGERY)

## 2025-01-28 PROCEDURE — 94002 VENT MGMT INPAT INIT DAY: CPT

## 2025-01-28 PROCEDURE — 85014 HEMATOCRIT: CPT | Mod: 91

## 2025-01-28 PROCEDURE — 5A1221Z PERFORMANCE OF CARDIAC OUTPUT, CONTINUOUS: ICD-10-PCS | Performed by: THORACIC SURGERY (CARDIOTHORACIC VASCULAR SURGERY)

## 2025-01-28 PROCEDURE — 700111 HCHG RX REV CODE 636 W/ 250 OVERRIDE (IP): Performed by: THORACIC SURGERY (CARDIOTHORACIC VASCULAR SURGERY)

## 2025-01-28 PROCEDURE — 700111 HCHG RX REV CODE 636 W/ 250 OVERRIDE (IP): Mod: JZ

## 2025-01-28 PROCEDURE — 82962 GLUCOSE BLOOD TEST: CPT | Mod: 91

## 2025-01-28 PROCEDURE — 02RX0JZ REPLACEMENT OF THORACIC AORTA, ASCENDING/ARCH WITH SYNTHETIC SUBSTITUTE, OPEN APPROACH: ICD-10-PCS | Performed by: THORACIC SURGERY (CARDIOTHORACIC VASCULAR SURGERY)

## 2025-01-28 PROCEDURE — 503001 HCHG PERFUSION: Performed by: THORACIC SURGERY (CARDIOTHORACIC VASCULAR SURGERY)

## 2025-01-28 PROCEDURE — 85730 THROMBOPLASTIN TIME PARTIAL: CPT

## 2025-01-28 PROCEDURE — C1751 CATH, INF, PER/CENT/MIDLINE: HCPCS | Performed by: THORACIC SURGERY (CARDIOTHORACIC VASCULAR SURGERY)

## 2025-01-28 PROCEDURE — 84132 ASSAY OF SERUM POTASSIUM: CPT | Mod: 91

## 2025-01-28 PROCEDURE — C1768 GRAFT, VASCULAR: HCPCS | Performed by: THORACIC SURGERY (CARDIOTHORACIC VASCULAR SURGERY)

## 2025-01-28 PROCEDURE — 71045 X-RAY EXAM CHEST 1 VIEW: CPT

## 2025-01-28 PROCEDURE — C1898 LEAD, PMKR, OTHER THAN TRANS: HCPCS | Performed by: THORACIC SURGERY (CARDIOTHORACIC VASCULAR SURGERY)

## 2025-01-28 PROCEDURE — 700102 HCHG RX REV CODE 250 W/ 637 OVERRIDE(OP)

## 2025-01-28 PROCEDURE — 700105 HCHG RX REV CODE 258

## 2025-01-28 PROCEDURE — 85610 PROTHROMBIN TIME: CPT

## 2025-01-28 PROCEDURE — 88305 TISSUE EXAM BY PATHOLOGIST: CPT | Performed by: PATHOLOGY

## 2025-01-28 PROCEDURE — 160009 HCHG ANES TIME/MIN: Performed by: THORACIC SURGERY (CARDIOTHORACIC VASCULAR SURGERY)

## 2025-01-28 PROCEDURE — 33863 ASCENDING AORTIC GRAFT: CPT | Mod: AS

## 2025-01-28 PROCEDURE — 700102 HCHG RX REV CODE 250 W/ 637 OVERRIDE(OP): Performed by: THORACIC SURGERY (CARDIOTHORACIC VASCULAR SURGERY)

## 2025-01-28 PROCEDURE — 85018 HEMOGLOBIN: CPT

## 2025-01-28 PROCEDURE — 5A1223Z PERFORMANCE OF CARDIAC PACING, CONTINUOUS: ICD-10-PCS | Performed by: THORACIC SURGERY (CARDIOTHORACIC VASCULAR SURGERY)

## 2025-01-28 PROCEDURE — C1889 IMPLANT/INSERT DEVICE, NOC: HCPCS | Performed by: THORACIC SURGERY (CARDIOTHORACIC VASCULAR SURGERY)

## 2025-01-28 PROCEDURE — 88304 TISSUE EXAM BY PATHOLOGIST: CPT | Mod: 26 | Performed by: PATHOLOGY

## 2025-01-28 PROCEDURE — 770022 HCHG ROOM/CARE - ICU (200)

## 2025-01-28 PROCEDURE — 99291 CRITICAL CARE FIRST HOUR: CPT | Performed by: INTERNAL MEDICINE

## 2025-01-28 PROCEDURE — P9047 ALBUMIN (HUMAN), 25%, 50ML: HCPCS

## 2025-01-28 PROCEDURE — A9270 NON-COVERED ITEM OR SERVICE: HCPCS | Performed by: THORACIC SURGERY (CARDIOTHORACIC VASCULAR SURGERY)

## 2025-01-28 PROCEDURE — 37799 UNLISTED PX VASCULAR SURGERY: CPT

## 2025-01-28 PROCEDURE — 83605 ASSAY OF LACTIC ACID: CPT | Mod: 91

## 2025-01-28 PROCEDURE — 160042 HCHG SURGERY MINUTES - EA ADDL 1 MIN LEVEL 5: Performed by: THORACIC SURGERY (CARDIOTHORACIC VASCULAR SURGERY)

## 2025-01-28 PROCEDURE — 33866 AORTIC HEMIARCH GRAFT: CPT | Performed by: THORACIC SURGERY (CARDIOTHORACIC VASCULAR SURGERY)

## 2025-01-28 PROCEDURE — 88305 TISSUE EXAM BY PATHOLOGIST: CPT | Mod: 26 | Performed by: PATHOLOGY

## 2025-01-28 PROCEDURE — 88304 TISSUE EXAM BY PATHOLOGIST: CPT | Performed by: PATHOLOGY

## 2025-01-28 PROCEDURE — 700105 HCHG RX REV CODE 258: Performed by: THORACIC SURGERY (CARDIOTHORACIC VASCULAR SURGERY)

## 2025-01-28 PROCEDURE — 94003 VENT MGMT INPAT SUBQ DAY: CPT

## 2025-01-28 PROCEDURE — 160031 HCHG SURGERY MINUTES - 1ST 30 MINS LEVEL 5: Performed by: THORACIC SURGERY (CARDIOTHORACIC VASCULAR SURGERY)

## 2025-01-28 PROCEDURE — A9270 NON-COVERED ITEM OR SERVICE: HCPCS

## 2025-01-28 PROCEDURE — 33863 ASCENDING AORTIC GRAFT: CPT | Performed by: THORACIC SURGERY (CARDIOTHORACIC VASCULAR SURGERY)

## 2025-01-28 PROCEDURE — 93325 DOPPLER ECHO COLOR FLOW MAPG: CPT

## 2025-01-28 PROCEDURE — C9248 INJ, CLEVIDIPINE BUTYRATE: HCPCS | Mod: JZ

## 2025-01-28 PROCEDURE — 700111 HCHG RX REV CODE 636 W/ 250 OVERRIDE (IP): Performed by: ANESTHESIOLOGY

## 2025-01-28 PROCEDURE — 5A2204Z RESTORATION OF CARDIAC RHYTHM, SINGLE: ICD-10-PCS | Performed by: THORACIC SURGERY (CARDIOTHORACIC VASCULAR SURGERY)

## 2025-01-28 PROCEDURE — 94799 UNLISTED PULMONARY SVC/PX: CPT

## 2025-01-28 PROCEDURE — 110371 HCHG SHELL REV 272: Performed by: THORACIC SURGERY (CARDIOTHORACIC VASCULAR SURGERY)

## 2025-01-28 PROCEDURE — 99292 CRITICAL CARE ADDL 30 MIN: CPT | Performed by: INTERNAL MEDICINE

## 2025-01-28 PROCEDURE — 86923 COMPATIBILITY TEST ELECTRIC: CPT

## 2025-01-28 PROCEDURE — 02RF08Z REPLACEMENT OF AORTIC VALVE WITH ZOOPLASTIC TISSUE, OPEN APPROACH: ICD-10-PCS | Performed by: THORACIC SURGERY (CARDIOTHORACIC VASCULAR SURGERY)

## 2025-01-28 PROCEDURE — 83735 ASSAY OF MAGNESIUM: CPT | Mod: 91

## 2025-01-28 PROCEDURE — 82803 BLOOD GASES ANY COMBINATION: CPT | Mod: 91

## 2025-01-28 PROCEDURE — 33866 AORTIC HEMIARCH GRAFT: CPT | Mod: AS

## 2025-01-28 PROCEDURE — 700111 HCHG RX REV CODE 636 W/ 250 OVERRIDE (IP): Mod: JZ | Performed by: THORACIC SURGERY (CARDIOTHORACIC VASCULAR SURGERY)

## 2025-01-28 PROCEDURE — 700101 HCHG RX REV CODE 250: Performed by: ANESTHESIOLOGY

## 2025-01-28 DEVICE — BONE PUTTY HEMOSTATIC ABSORBABLE (12/BX): Type: IMPLANTABLE DEVICE | Status: FUNCTIONAL

## 2025-01-28 DEVICE — MARKER GRAFT AC VEIN DISK SHAPED (10EA/BX): Type: IMPLANTABLE DEVICE | Status: FUNCTIONAL

## 2025-01-28 DEVICE — IMPLANTABLE DEVICE: Type: IMPLANTABLE DEVICE | Status: FUNCTIONAL

## 2025-01-28 RX ORDER — VANCOMYCIN HYDROCHLORIDE 1 G/20ML
INJECTION, POWDER, LYOPHILIZED, FOR SOLUTION INTRAVENOUS
Status: COMPLETED | OUTPATIENT
Start: 2025-01-28 | End: 2025-01-28

## 2025-01-28 RX ORDER — HEPARIN SODIUM,PORCINE 1000/ML
VIAL (ML) INJECTION PRN
Status: DISCONTINUED | OUTPATIENT
Start: 2025-01-28 | End: 2025-01-28

## 2025-01-28 RX ORDER — PROTAMINE SULFATE 10 MG/ML
INJECTION, SOLUTION INTRAVENOUS PRN
Status: DISCONTINUED | OUTPATIENT
Start: 2025-01-28 | End: 2025-01-28 | Stop reason: SURG

## 2025-01-28 RX ORDER — BISACODYL 10 MG
10 SUPPOSITORY, RECTAL RECTAL
Status: DISCONTINUED | OUTPATIENT
Start: 2025-01-28 | End: 2025-02-02 | Stop reason: HOSPADM

## 2025-01-28 RX ORDER — POTASSIUM CHLORIDE 7.45 MG/ML
10 INJECTION INTRAVENOUS ONCE
Status: COMPLETED | OUTPATIENT
Start: 2025-01-28 | End: 2025-01-28

## 2025-01-28 RX ORDER — AMIODARONE HYDROCHLORIDE 200 MG/1
400 TABLET ORAL 2 TIMES DAILY
Status: DISCONTINUED | OUTPATIENT
Start: 2025-01-29 | End: 2025-01-29

## 2025-01-28 RX ORDER — POLYETHYLENE GLYCOL 3350 17 G/17G
1 POWDER, FOR SOLUTION ORAL DAILY
Status: DISCONTINUED | OUTPATIENT
Start: 2025-01-29 | End: 2025-02-02 | Stop reason: HOSPADM

## 2025-01-28 RX ORDER — ASPIRIN 81 MG/1
81 TABLET ORAL DAILY
Status: DISCONTINUED | OUTPATIENT
Start: 2025-01-29 | End: 2025-02-02 | Stop reason: HOSPADM

## 2025-01-28 RX ORDER — DEXTROSE MONOHYDRATE 25 G/50ML
12.5-25 INJECTION, SOLUTION INTRAVENOUS PRN
Status: DISCONTINUED | OUTPATIENT
Start: 2025-01-28 | End: 2025-01-29

## 2025-01-28 RX ORDER — MANNITOL 250 MG/ML
INJECTION, SOLUTION INTRAVENOUS PRN
Status: DISCONTINUED | OUTPATIENT
Start: 2025-01-28 | End: 2025-01-28 | Stop reason: SURG

## 2025-01-28 RX ORDER — ACETAMINOPHEN 500 MG
1000 TABLET ORAL ONCE
Status: COMPLETED | OUTPATIENT
Start: 2025-01-28 | End: 2025-01-28

## 2025-01-28 RX ORDER — METOPROLOL TARTRATE 25 MG/1
12.5 TABLET, FILM COATED ORAL ONCE
Status: COMPLETED | OUTPATIENT
Start: 2025-01-28 | End: 2025-01-28

## 2025-01-28 RX ORDER — NOREPINEPHRINE BITARTRATE 0.03 MG/ML
0-1 INJECTION, SOLUTION INTRAVENOUS CONTINUOUS
Status: DISCONTINUED | OUTPATIENT
Start: 2025-01-28 | End: 2025-01-29

## 2025-01-28 RX ORDER — METOPROLOL TARTRATE 25 MG/1
12.5 TABLET, FILM COATED ORAL 2 TIMES DAILY
Status: COMPLETED | OUTPATIENT
Start: 2025-01-29 | End: 2025-01-29

## 2025-01-28 RX ORDER — TRAMADOL HYDROCHLORIDE 50 MG/1
50 TABLET ORAL EVERY 4 HOURS PRN
Status: DISCONTINUED | OUTPATIENT
Start: 2025-01-28 | End: 2025-02-02 | Stop reason: HOSPADM

## 2025-01-28 RX ORDER — NITROGLYCERIN 20 MG/100ML
0-100 INJECTION INTRAVENOUS CONTINUOUS
Status: DISCONTINUED | OUTPATIENT
Start: 2025-01-28 | End: 2025-01-29

## 2025-01-28 RX ORDER — METOPROLOL TARTRATE 25 MG/1
25 TABLET, FILM COATED ORAL 2 TIMES DAILY
Status: DISCONTINUED | OUTPATIENT
Start: 2025-01-30 | End: 2025-01-31

## 2025-01-28 RX ORDER — MIDAZOLAM HYDROCHLORIDE 1 MG/ML
2 INJECTION INTRAMUSCULAR; INTRAVENOUS
Status: DISCONTINUED | OUTPATIENT
Start: 2025-01-28 | End: 2025-01-28

## 2025-01-28 RX ORDER — SODIUM CHLORIDE, SODIUM ACETATE ANHYDROUS, SODIUM GLUCONATE, POTASSIUM CHLORIDE, AND MAGNESIUM CHLORIDE 526; 222; 502; 37; 30 MG/100ML; MG/100ML; MG/100ML; MG/100ML; MG/100ML
INJECTION, SOLUTION INTRAVENOUS PRN
Status: DISCONTINUED | OUTPATIENT
Start: 2025-01-28 | End: 2025-01-28 | Stop reason: SURG

## 2025-01-28 RX ORDER — INSULIN LISPRO 100 [IU]/ML
0-14 INJECTION, SOLUTION INTRAVENOUS; SUBCUTANEOUS
Status: DISCONTINUED | OUTPATIENT
Start: 2025-01-28 | End: 2025-01-29

## 2025-01-28 RX ORDER — ACETAMINOPHEN 325 MG/1
650 TABLET ORAL EVERY 4 HOURS PRN
Status: DISCONTINUED | OUTPATIENT
Start: 2025-01-28 | End: 2025-02-02 | Stop reason: HOSPADM

## 2025-01-28 RX ORDER — EPINEPHRINE HCL IN 0.9 % NACL 4MG/250ML
0-.5 PLASTIC BAG, INJECTION (ML) INTRAVENOUS CONTINUOUS
Status: DISCONTINUED | OUTPATIENT
Start: 2025-01-28 | End: 2025-01-29

## 2025-01-28 RX ORDER — ALUMINA, MAGNESIA, AND SIMETHICONE 2400; 2400; 240 MG/30ML; MG/30ML; MG/30ML
30 SUSPENSION ORAL EVERY 4 HOURS PRN
Status: DISCONTINUED | OUTPATIENT
Start: 2025-01-28 | End: 2025-02-02 | Stop reason: HOSPADM

## 2025-01-28 RX ORDER — ONDANSETRON 2 MG/ML
8 INJECTION INTRAMUSCULAR; INTRAVENOUS EVERY 6 HOURS PRN
Status: DISCONTINUED | OUTPATIENT
Start: 2025-01-28 | End: 2025-02-02 | Stop reason: HOSPADM

## 2025-01-28 RX ORDER — PHENYLEPHRINE HYDROCHLORIDE 10 MG/ML
INJECTION, SOLUTION INTRAMUSCULAR; INTRAVENOUS; SUBCUTANEOUS PRN
Status: DISCONTINUED | OUTPATIENT
Start: 2025-01-28 | End: 2025-01-28

## 2025-01-28 RX ORDER — MAGNESIUM SULFATE 1 G/100ML
1 INJECTION INTRAVENOUS DAILY
Status: COMPLETED | OUTPATIENT
Start: 2025-01-28 | End: 2025-01-30

## 2025-01-28 RX ORDER — OXYCODONE HYDROCHLORIDE 10 MG/1
10 TABLET ORAL
Status: DISCONTINUED | OUTPATIENT
Start: 2025-01-28 | End: 2025-02-02 | Stop reason: HOSPADM

## 2025-01-28 RX ORDER — ACETAMINOPHEN 650 MG/1
650 SUPPOSITORY RECTAL EVERY 4 HOURS PRN
Status: DISCONTINUED | OUTPATIENT
Start: 2025-01-28 | End: 2025-02-02 | Stop reason: HOSPADM

## 2025-01-28 RX ORDER — SODIUM CHLORIDE 9 MG/ML
INJECTION, SOLUTION INTRAVENOUS CONTINUOUS
Status: DISCONTINUED | OUTPATIENT
Start: 2025-01-28 | End: 2025-02-02 | Stop reason: HOSPADM

## 2025-01-28 RX ORDER — MORPHINE SULFATE 4 MG/ML
4 INJECTION INTRAVENOUS
Status: DISCONTINUED | OUTPATIENT
Start: 2025-01-28 | End: 2025-01-28

## 2025-01-28 RX ORDER — MIDAZOLAM HYDROCHLORIDE 1 MG/ML
INJECTION INTRAMUSCULAR; INTRAVENOUS PRN
Status: DISCONTINUED | OUTPATIENT
Start: 2025-01-28 | End: 2025-01-28

## 2025-01-28 RX ORDER — CALCIUM CHLORIDE 100 MG/ML
INJECTION INTRAVENOUS; INTRAVENTRICULAR PRN
Status: DISCONTINUED | OUTPATIENT
Start: 2025-01-28 | End: 2025-01-28

## 2025-01-28 RX ORDER — PROCHLORPERAZINE EDISYLATE 5 MG/ML
10 INJECTION INTRAMUSCULAR; INTRAVENOUS EVERY 6 HOURS PRN
Status: DISCONTINUED | OUTPATIENT
Start: 2025-01-28 | End: 2025-02-02 | Stop reason: HOSPADM

## 2025-01-28 RX ORDER — ETHYL ALCOHOL 62 %
1 SWAB, MEDICATED TOPICAL
Status: DISCONTINUED | OUTPATIENT
Start: 2025-01-28 | End: 2025-02-02 | Stop reason: HOSPADM

## 2025-01-28 RX ORDER — DEXMEDETOMIDINE HYDROCHLORIDE 4 UG/ML
0-1.5 INJECTION, SOLUTION INTRAVENOUS CONTINUOUS
Status: DISCONTINUED | OUTPATIENT
Start: 2025-01-28 | End: 2025-01-30

## 2025-01-28 RX ORDER — ENOXAPARIN SODIUM 100 MG/ML
40 INJECTION SUBCUTANEOUS DAILY
Status: DISCONTINUED | OUTPATIENT
Start: 2025-01-29 | End: 2025-02-01

## 2025-01-28 RX ORDER — OMEPRAZOLE 20 MG/1
20 CAPSULE, DELAYED RELEASE ORAL DAILY
Status: DISCONTINUED | OUTPATIENT
Start: 2025-01-29 | End: 2025-02-02 | Stop reason: HOSPADM

## 2025-01-28 RX ORDER — AMIODARONE HYDROCHLORIDE 150 MG/3ML
INJECTION, SOLUTION INTRAVENOUS PRN
Status: DISCONTINUED | OUTPATIENT
Start: 2025-01-28 | End: 2025-01-28

## 2025-01-28 RX ORDER — CEFAZOLIN SODIUM 1 G/3ML
INJECTION, POWDER, FOR SOLUTION INTRAMUSCULAR; INTRAVENOUS PRN
Status: DISCONTINUED | OUTPATIENT
Start: 2025-01-28 | End: 2025-01-28 | Stop reason: SURG

## 2025-01-28 RX ORDER — LIDOCAINE HYDROCHLORIDE 20 MG/ML
INJECTION, SOLUTION EPIDURAL; INFILTRATION; INTRACAUDAL; PERINEURAL PRN
Status: DISCONTINUED | OUTPATIENT
Start: 2025-01-28 | End: 2025-01-28 | Stop reason: SURG

## 2025-01-28 RX ORDER — ACETAMINOPHEN 500 MG
1000 TABLET ORAL EVERY 6 HOURS PRN
Status: DISCONTINUED | OUTPATIENT
Start: 2025-02-07 | End: 2025-02-02 | Stop reason: HOSPADM

## 2025-01-28 RX ORDER — DEXAMETHASONE SODIUM PHOSPHATE 4 MG/ML
INJECTION, SOLUTION INTRA-ARTICULAR; INTRALESIONAL; INTRAMUSCULAR; INTRAVENOUS; SOFT TISSUE PRN
Status: DISCONTINUED | OUTPATIENT
Start: 2025-01-28 | End: 2025-01-28 | Stop reason: SURG

## 2025-01-28 RX ORDER — AMOXICILLIN 250 MG
2 CAPSULE ORAL 2 TIMES DAILY
Status: DISCONTINUED | OUTPATIENT
Start: 2025-01-28 | End: 2025-02-02 | Stop reason: HOSPADM

## 2025-01-28 RX ORDER — SODIUM CHLORIDE, SODIUM GLUCONATE, SODIUM ACETATE, POTASSIUM CHLORIDE AND MAGNESIUM CHLORIDE 526; 502; 368; 37; 30 MG/100ML; MG/100ML; MG/100ML; MG/100ML; MG/100ML
INJECTION, SOLUTION INTRAVENOUS PRN
Status: DISCONTINUED | OUTPATIENT
Start: 2025-01-28 | End: 2025-02-02 | Stop reason: HOSPADM

## 2025-01-28 RX ORDER — OXYCODONE HYDROCHLORIDE 5 MG/1
5 TABLET ORAL
Status: DISCONTINUED | OUTPATIENT
Start: 2025-01-28 | End: 2025-02-02 | Stop reason: HOSPADM

## 2025-01-28 RX ORDER — ACETAMINOPHEN 500 MG
1000 TABLET ORAL EVERY 6 HOURS
Status: DISCONTINUED | OUTPATIENT
Start: 2025-01-28 | End: 2025-02-02 | Stop reason: HOSPADM

## 2025-01-28 RX ADMIN — SODIUM CHLORIDE, SODIUM ACETATE ANHYDROUS, SODIUM GLUCONATE, POTASSIUM CHLORIDE, AND MAGNESIUM CHLORIDE 1500 ML: 526; 222; 502; 37; 30 INJECTION, SOLUTION INTRAVENOUS at 08:57

## 2025-01-28 RX ADMIN — CLEVIPIDINE 2 MG/HR: 0.5 EMULSION INTRAVENOUS at 12:41

## 2025-01-28 RX ADMIN — LIDOCAINE HYDROCHLORIDE 100 MG: 20 INJECTION, SOLUTION EPIDURAL; INFILTRATION; INTRACAUDAL; PERINEURAL at 07:57

## 2025-01-28 RX ADMIN — AMIODARONE HYDROCHLORIDE 150 MG: 50 INJECTION, SOLUTION INTRAVENOUS at 12:19

## 2025-01-28 RX ADMIN — ROCURONIUM BROMIDE 100 MG: 10 INJECTION, SOLUTION INTRAVENOUS at 07:57

## 2025-01-28 RX ADMIN — PROTAMINE SULFATE 400 MG: 10 INJECTION, SOLUTION INTRAVENOUS at 12:30

## 2025-01-28 RX ADMIN — CEFAZOLIN 2 G: 2 INJECTION, POWDER, FOR SOLUTION INTRAMUSCULAR; INTRAVENOUS at 20:14

## 2025-01-28 RX ADMIN — SODIUM CHLORIDE, SODIUM ACETATE ANHYDROUS, SODIUM GLUCONATE, POTASSIUM CHLORIDE, AND MAGNESIUM CHLORIDE 500 ML: 526; 222; 502; 37; 30 INJECTION, SOLUTION INTRAVENOUS at 12:16

## 2025-01-28 RX ADMIN — Medication 36000 UNITS: at 08:32

## 2025-01-28 RX ADMIN — FENTANYL CITRATE 100 MCG: 50 INJECTION, SOLUTION INTRAMUSCULAR; INTRAVENOUS at 08:04

## 2025-01-28 RX ADMIN — AMIODARONE HYDROCHLORIDE 150 MG: 1.5 INJECTION, SOLUTION INTRAVENOUS at 19:50

## 2025-01-28 RX ADMIN — PHENYLEPHRINE HYDROCHLORIDE 300 MCG: 10 INJECTION INTRAVENOUS at 10:15

## 2025-01-28 RX ADMIN — PROPOFOL 50 MG: 10 INJECTION, EMULSION INTRAVENOUS at 10:57

## 2025-01-28 RX ADMIN — MAGNESIUM SULFATE IN DEXTROSE 1 G: 10 INJECTION, SOLUTION INTRAVENOUS at 14:31

## 2025-01-28 RX ADMIN — PROPOFOL 150 MG: 10 INJECTION, EMULSION INTRAVENOUS at 09:57

## 2025-01-28 RX ADMIN — SODIUM BICARBONATE 50 MEQ: 84 INJECTION, SOLUTION INTRAVENOUS at 09:42

## 2025-01-28 RX ADMIN — MANNITOL 20 G: 12.5 INJECTION, SOLUTION INTRAVENOUS at 08:57

## 2025-01-28 RX ADMIN — AMINOCAPROIC ACID 5 G: 250 INJECTION, SOLUTION INTRAVENOUS at 08:57

## 2025-01-28 RX ADMIN — PROPOFOL 140 MG: 10 INJECTION, EMULSION INTRAVENOUS at 07:57

## 2025-01-28 RX ADMIN — SODIUM CHLORIDE, SODIUM ACETATE ANHYDROUS, SODIUM GLUCONATE, POTASSIUM CHLORIDE, AND MAGNESIUM CHLORIDE 500 ML: 526; 222; 502; 37; 30 INJECTION, SOLUTION INTRAVENOUS at 12:00

## 2025-01-28 RX ADMIN — PHENYLEPHRINE HYDROCHLORIDE 100 MCG: 10 INJECTION INTRAVENOUS at 09:28

## 2025-01-28 RX ADMIN — SODIUM CHLORIDE, SODIUM ACETATE ANHYDROUS, SODIUM GLUCONATE, POTASSIUM CHLORIDE, AND MAGNESIUM CHLORIDE 500 ML: 526; 222; 502; 37; 30 INJECTION, SOLUTION INTRAVENOUS at 10:06

## 2025-01-28 RX ADMIN — POTASSIUM CHLORIDE 10 MEQ: 7.46 INJECTION, SOLUTION INTRAVENOUS at 14:26

## 2025-01-28 RX ADMIN — DEXAMETHASONE SODIUM PHOSPHATE 20 MG: 4 INJECTION INTRA-ARTICULAR; INTRALESIONAL; INTRAMUSCULAR; INTRAVENOUS; SOFT TISSUE at 08:57

## 2025-01-28 RX ADMIN — POTASSIUM CHLORIDE 10 MEQ: 7.46 INJECTION, SOLUTION INTRAVENOUS at 19:07

## 2025-01-28 RX ADMIN — PHENYLEPHRINE HYDROCHLORIDE 100 MCG: 10 INJECTION INTRAVENOUS at 09:26

## 2025-01-28 RX ADMIN — SODIUM CHLORIDE: 9 INJECTION, SOLUTION INTRAVENOUS at 13:55

## 2025-01-28 RX ADMIN — TRAMADOL HYDROCHLORIDE 50 MG: 50 TABLET, COATED ORAL at 23:59

## 2025-01-28 RX ADMIN — MIDAZOLAM HYDROCHLORIDE 2 MG: 1 INJECTION, SOLUTION INTRAMUSCULAR; INTRAVENOUS at 07:59

## 2025-01-28 RX ADMIN — HEPARIN SODIUM 10000 UNITS: 1000 INJECTION, SOLUTION INTRAVENOUS; SUBCUTANEOUS at 11:20

## 2025-01-28 RX ADMIN — SODIUM CHLORIDE: 9 INJECTION, SOLUTION INTRAVENOUS at 13:59

## 2025-01-28 RX ADMIN — MIDAZOLAM HYDROCHLORIDE 2 MG: 1 INJECTION, SOLUTION INTRAMUSCULAR; INTRAVENOUS at 18:16

## 2025-01-28 RX ADMIN — SODIUM BICARBONATE 50 MEQ: 84 INJECTION, SOLUTION INTRAVENOUS at 08:57

## 2025-01-28 RX ADMIN — SODIUM BICARBONATE 50 MEQ: 84 INJECTION, SOLUTION INTRAVENOUS at 10:25

## 2025-01-28 RX ADMIN — CEFAZOLIN 2 G: 1 INJECTION, POWDER, FOR SOLUTION INTRAMUSCULAR; INTRAVENOUS at 11:31

## 2025-01-28 RX ADMIN — SODIUM CHLORIDE 2 UNITS/HR: 9 INJECTION, SOLUTION INTRAVENOUS at 09:45

## 2025-01-28 RX ADMIN — SODIUM CHLORIDE, SODIUM ACETATE ANHYDROUS, SODIUM GLUCONATE, POTASSIUM CHLORIDE, AND MAGNESIUM CHLORIDE 500 ML: 526; 222; 502; 37; 30 INJECTION, SOLUTION INTRAVENOUS at 12:23

## 2025-01-28 RX ADMIN — SODIUM BICARBONATE 50 MEQ: 84 INJECTION, SOLUTION INTRAVENOUS at 11:20

## 2025-01-28 RX ADMIN — AMINOCAPROIC ACID 10 G: 250 INJECTION, SOLUTION INTRAVENOUS at 08:00

## 2025-01-28 RX ADMIN — ACETAMINOPHEN 1000 MG: 500 TABLET ORAL at 06:49

## 2025-01-28 RX ADMIN — ROCURONIUM BROMIDE 100 MG: 10 INJECTION, SOLUTION INTRAVENOUS at 09:46

## 2025-01-28 RX ADMIN — METOPROLOL TARTRATE 12.5 MG: 25 TABLET, FILM COATED ORAL at 06:49

## 2025-01-28 RX ADMIN — SODIUM BICARBONATE 50 MEQ: 84 INJECTION, SOLUTION INTRAVENOUS at 10:31

## 2025-01-28 RX ADMIN — Medication 1 APPLICATOR: at 14:12

## 2025-01-28 RX ADMIN — CEFAZOLIN 2 G: 1 INJECTION, POWDER, FOR SOLUTION INTRAMUSCULAR; INTRAVENOUS at 07:57

## 2025-01-28 RX ADMIN — CLEVIPIDINE 4 MG/HR: 0.5 EMULSION INTRAVENOUS at 13:57

## 2025-01-28 RX ADMIN — CALCIUM CHLORIDE 1 G: 100 INJECTION, SOLUTION INTRAVENOUS; INTRAVENTRICULAR at 12:20

## 2025-01-28 RX ADMIN — AMIODARONE HYDROCHLORIDE 1 MG/MIN: 1.8 INJECTION, SOLUTION INTRAVENOUS at 20:02

## 2025-01-28 RX ADMIN — HEPARIN SODIUM 5000 UNITS: 1000 INJECTION, SOLUTION INTRAVENOUS; SUBCUTANEOUS at 08:57

## 2025-01-28 RX ADMIN — AMINOCAPROIC ACID 5 G: 250 INJECTION, SOLUTION INTRAVENOUS at 08:16

## 2025-01-28 RX ADMIN — SODIUM CHLORIDE, SODIUM ACETATE ANHYDROUS, SODIUM GLUCONATE, POTASSIUM CHLORIDE, AND MAGNESIUM CHLORIDE 200 ML: 526; 222; 502; 37; 30 INJECTION, SOLUTION INTRAVENOUS at 10:18

## 2025-01-28 RX ADMIN — METHADONE HYDROCHLORIDE 20 MG: 10 INJECTION, SOLUTION INTRAMUSCULAR; INTRAVENOUS; SUBCUTANEOUS at 08:45

## 2025-01-28 RX ADMIN — Medication 1 APPLICATOR: at 20:16

## 2025-01-28 ASSESSMENT — PAIN DESCRIPTION - PAIN TYPE
TYPE: SURGICAL PAIN
TYPE: ACUTE PAIN
TYPE: SURGICAL PAIN
TYPE: ACUTE PAIN;SURGICAL PAIN
TYPE: SURGICAL PAIN

## 2025-01-28 ASSESSMENT — ENCOUNTER SYMPTOMS
PALPITATIONS: 0
FEVER: 0
HEARTBURN: 0
COUGH: 0
BRUISES/BLEEDS EASILY: 0
HEADACHES: 1
DOUBLE VISION: 0
DEPRESSION: 0
BLURRED VISION: 1
CHILLS: 0
SENSORY CHANGE: 0
MYALGIAS: 1

## 2025-01-28 ASSESSMENT — FIBROSIS 4 INDEX: FIB4 SCORE: 0.53

## 2025-01-28 NOTE — ANESTHESIA PROCEDURE NOTES
Airway    Date/Time: 1/28/2025 7:57 AM    Performed by: Humberto Saleh M.D.  Authorized by: Humberto Saleh M.D.    Location:  OR  Urgency:  Elective  Indications for Airway Management:  Anesthesia      Spontaneous Ventilation: absent    Sedation Level:  Deep  Preoxygenated: Yes    Patient Position:  Sniffing  Final Airway Type:  Endotracheal airway  Final Endotracheal Airway:  ETT  Cuffed: Yes    Technique Used for Successful ETT Placement:  Direct laryngoscopy    Insertion Site:  Oral  Blade Type:  Roxane  Laryngoscope Blade/Videolaryngoscope Blade Size:  3  ETT Size (mm):  8.0  Measured from:  Teeth  ETT to Teeth (cm):  21  Placement Verified by: auscultation and capnometry    Cormack-Lehane Classification:  Grade IIa - partial view of glottis  Number of Attempts at Approach:  1

## 2025-01-28 NOTE — ANESTHESIA POSTPROCEDURE EVALUATION
Patient: Donovan Vail    Procedure Summary       Date: 01/28/25 Room / Location: [facility] 02 / SURGERY [facility]    Anesthesia Start: 0751 Anesthesia Stop: 1345    Procedures:       AORTIC ROOT REPLACEMENT (Chest)      ECHOCARDIOGRAM, TRANSESOPHAGEAL, INTRAOPERATIVE (Chest)      REPLACEMENT, AORTIC VALVE (Chest)      REPAIR, ANEURYSM , HEMIARCH, CIRCULATORY ARREST (Chest) Diagnosis: (ASCENDING AORTIC ANEURYSM, MILD AORTIC REGURGITATION)    Surgeons: Yann Smith M.D. Responsible Provider: Humberto Saleh M.D.    Anesthesia Type: general ASA Status: 3            Final Anesthesia Type: general  Last vitals  BP   Blood Pressure: 103/66, Arterial BP: 101/59    Temp   36.7 °C (98 °F)    Pulse   (!) 54   Resp   (!) 21    SpO2   98 %      Anesthesia Post Evaluation    Patient location during evaluation: ICU  Patient participation: waiting for patient participation  Level of consciousness: awake and alert and obtunded/minimal responses  Pain score: 0    Airway patency: patent  Anesthetic complications: no  Cardiovascular status: hemodynamically stable  Respiratory status: acceptable and ETT  Hydration status: euvolemic    PONV: none          No notable events documented.     Nurse Pain Score: 0 (NPRS)

## 2025-01-28 NOTE — PROGRESS NOTES
Medication history reviewed with PT at bedside    Med rec is complete per PT reporting    Allergies reviewed.     Patient denies any outpatient antibiotics in the last 30 days.     Patient is not taking anticoagulants.    Preferred pharmacy for this visit - [facility] on [address] ([phone])

## 2025-01-28 NOTE — ANESTHESIA TIME REPORT
Anesthesia Start and Stop Event Times       Date Time Event    1/28/2025 0751 Anesthesia Start     0754 Ready for Procedure     1345 Anesthesia Stop          Responsible Staff  01/28/25      Name Role Begin Yvonne    Abhishek Donovan Perfusionist [id number]    Humberto Saleh M.D. Anesth [id number]          Overtime Reason:  no overtime (within assigned shift)    Comments:

## 2025-01-28 NOTE — ANESTHESIA PROCEDURE NOTES
Arterial Line    Performed by: Humberto Saleh M.D.  Authorized by: Humberto Saleh M.D.    Start Time:  1/28/2025 7:55 AM  Localization: surface landmarks    Patient Location:  OR  Indication: continuous blood pressure monitoring        Catheter Size:  20 G  Seldinger Technique?: Yes    Laterality:  Left  Site:  Radial artery  Line Secured:  Antimicrobial disc, tape and transparent dressing  Events: patient tolerated procedure well with no complications
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
## 2025-01-28 NOTE — PROGRESS NOTES
Pt arrived s/p AAA repair and AVR . Report received from  anesthesiologist. Pacer tested rate 70 Ma 10 Mv 2. Chest tubes placed to suction. No Air leak noted. Pt. On inuslin and Clevi gtt. Chest x ray reviewed by intensivist. Tiago rouse applied. Labs sent.

## 2025-01-28 NOTE — CONSULTS
Critical Care Consultation    Date of consult: 1/28/2025    Referring Physician  Yann Smith M.D.    Reason for Consultation  Post cardiac surgery for aortic aneurysm repair    History of Presenting Illness  64 y.o. male who presented 1/28/2025 with hx of tobacco abuse, chol, CAD, necrotizing fasciitis, skin grafts,  tobacco abuse, methamphetamine abuse and tested + on prior surgery so was canceled. That has had 5.4 cm aortic aneurysm. He today underwent planned root replacement. Shortly after circulatory arrest and starting cardiac bypass patient had spontaneous rupture of his aneurysm. He underwent the rest of his surgery uneventful with Bio-Bentall 29mm Konect Corrigan valved conduit and coronary reimplantation with ascending hemiarch repair with 32 mm tube graft. His post BiV function was normal and trace mild MR, was on insulin gtt, and clevi gtt upon arrival to ICU. He received 2.5L of crystalloid, 550ml of cell saver. He should be good for early extubation protocol pending his ICU course.     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    He has no past medical history of Breath shortness or Dental disorder.    Surgical History   has a past surgical history that includes split thickness skin graft (1/21/2015); other surgical procedure (Right, 2013); other surgical procedure (Right, 2014); nerve ulnar transfer (Right, 03/2017); other surgical procedure (Right, 7766-1108); inguinal hernia repair (Right, 2005); and inguinal hernia laparoscopic bilateral (Bilateral, 11/20/2017).    Family History  family history includes Arterial Aneurysm in his father; Heart  Disease in his father; Hypertension in his mother.    Social History   reports that he quit smoking about 4 years ago. His smoking use included cigarettes. He started smoking about 50 years ago. He has a 46.4 pack-year smoking history. His smokeless tobacco use includes snuff and chew. He reports current alcohol use. He reports current drug use. Drugs: Inhaled and Marijuana.    Medications  Home Medications       Reviewed by Coco Ibrahim (Pharmacy Tech) on 01/28/25 at 0651  Med List Status: Complete     Medication Last Dose Status   acetaminophen (TYLENOL) 500 MG Tab 1/27/2025 Active   ibuprofen (MOTRIN) 200 MG Tab 1/27/2025 Active   metoprolol SR (TOPROL XL) 50 MG TABLET SR 24 HR 1/27/2025 Active   rosuvastatin (CRESTOR) 5 MG Tab 1/27/2025 Active                  Audit from Redirected Encounters    **Home medications have not yet been reviewed for this encounter**       Current Facility-Administered Medications   Medication Dose Route Frequency Provider Last Rate Last Admin    norepinephrine (Levophed) 8 mg in 250 mL NS infusion (premix)  0-1 mcg/kg/min (Ideal) Intravenous Continuous Matthew Perry P.A.-C.   Dose not Required at 01/28/25 1400    EPINEPHrine (Adrenalin) infusion 4 mg/250 mL (premix)  0-0.5 mcg/kg/min (Ideal) Intravenous Continuous Matthew Perry P.A.-C.   Dose not Required at 01/28/25 1400    insulin regular (HumuLIN R/NovoLIN R) 1 unit/mL syringe (IV ONLY) 0-14 Units  0-14 Units Intravenous Once Matthew Perry P.A.-C.        insulin lispro (HumaLOG,AdmeLOG) subcutaneous injection  0-14 Units Subcutaneous TID AC Matthew Perry P.A.-C.        insulin regular (HumuLIN R/NovoLIN R) 100 Units in  mL infusion premix  0-29 Units/hr Intravenous Continuous Matthew Perry P.A.-C. 2 mL/hr at 01/28/25 1348 2 Units/hr at 01/28/25 1348    dextrose 50% (D50W) injection 12.5-25 g  12.5-25 g Intravenous PRN Matthew Perry P.A.-C.        MD Alert...Pharmacy to initiate transition from insulin infusion to  subcutaneous insulin for cardiothoracic surgery 1 Each  1 Each Other Continuous Matthew Perry P.A.-C.        Respiratory Therapy Consult   Nebulization Continuous RT Matthew Perry P.A.-C.        NS infusion   Intravenous Continuous Matthew Perry P.A.-C.        NS infusion   Intravenous Continuous Matthew Perry P.A.-C. 30 mL/hr at 01/28/25 1355 New Bag at 01/28/25 1355    [START ON 1/29/2025] enoxaparin (Lovenox) inj 40 mg  40 mg Subcutaneous DAILY AT 1800 Matthew Perry P.A.-C.        Nozin nasal  swab  1 Applicator Each Nostril BID Matthew Perry P.A.-C.        calcium CHLORIDE 10 % 1,000 mg in dextrose 5% 100 mL IVPB  1,000 mg Intravenous Once PRN Matthew Perry P.A.-C.        [START ON 1/29/2025] calcium CHLORIDE 10 % 1,000 mg in dextrose 5% 100 mL IVPB  1,000 mg Intravenous Once PRN Matthew Perry P.A.-C.        magnesium sulfate in D5W IVPB premix 1 g  1 g Intravenous DAILY Matthew Perry P.A.-C.        K+ Scale: Goal of 4.5  1 Each Intravenous Q6HRS Matthew Perry P.A.-C.        [START ON 1/29/2025] metoprolol tartrate (Lopressor) tablet 12.5 mg  12.5 mg Oral BID Matthew Perry P.A.-C.        Followed by    [START ON 1/30/2025] metoprolol tartrate (Lopressor) tablet 25 mg  25 mg Oral BID Matthew Perry P.A.-C.        [START ON 1/29/2025] aspirin EC tablet 81 mg  81 mg Oral DAILY Matthew Perry P.A.-C.        clevidipine (Cleviprex) IV emulsion  0-21 mg/hr Intravenous Continuous Matthew Perry P.A.-C. 8 mL/hr at 01/28/25 1357 4 mg/hr at 01/28/25 1357    nitroglycerin 50 mg in D5W 250 ml infusion  0-100 mcg/min Intravenous Continuous Matthew Perry P.A.-C.   Dose not Required at 01/28/25 1400    Pharmacy Consult Request ...Pain Management Review 1 Each  1 Each Other PHARMACY TO DOSE Matthew Perry P.A.-C.        acetaminophen (Tylenol) tablet 1,000 mg  1,000 mg Oral Q6HRS Matthew Perry P.A.-C.        Followed by    [START ON 2/7/2025] acetaminophen (Tylenol) tablet 1,000 mg  1,000 mg Oral Q6HRS PRN Matthew Perry,  P.A.-C.        oxyCODONE immediate-release (Roxicodone) tablet 5 mg  5 mg Oral Q3HRS PRN ALVARO Aparicio.-C.        Or    oxyCODONE immediate release (Roxicodone) tablet 10 mg  10 mg Oral Q3HRS PRN RADHA Aparicio.A.-C.        Or    fentaNYL (Sublimaze) injection 50 mcg  50 mcg Intravenous Q3HRS PRN ALEKSEY AparicioA.-C.        traMADol (Ultram) 50 MG tablet 50 mg  50 mg Oral Q4HRS PRN RADHA Aparicio.A.-C.        midazolam (Versed) injection 2 mg  2 mg Intravenous Q HOUR PRN ALEKSEY AparicioA.-C.        dexmedetomidine (Precedex) 400 mcg/100mL infusion  0-1.5 mcg/kg/hr (Ideal) Intravenous Continuous ALEKSEY AparicioA.-C.   Dose not Required at 01/28/25 1400    sodium bicarbonate 8.4 % injection 50 mEq  50 mEq Intravenous Q HOUR PRN ALEKSEY AparicioA.-C.        morphine 4 MG/ML injection 4 mg  4 mg Intravenous Q HOUR PRN RADHA Aparicio.A.-C.        ondansetron (Zofran) syringe/vial injection 8 mg  8 mg Intravenous Q6HRS PRN ALEKSEY AparicioA.-C.        Or    prochlorperazine (Compazine) injection 10 mg  10 mg Intravenous Q6HRS PRN RADHA Aparicio.A.-C.        acetaminophen (Tylenol) tablet 650 mg  650 mg Oral Q4HRS PRN ALEKSEY AparicioA.-C.        Or    acetaminophen (Tylenol) suppository 650 mg  650 mg Rectal Q4HRS PRN RADHA Aparicio.A.-C.        senna-docusate (Pericolace Or Senokot S) 8.6-50 MG per tablet 2 Tablet  2 Tablet Oral BID Matthew Perry P.A.-C.        And    [START ON 1/29/2025] polyethylene glycol/lytes (Miralax) Packet 1 Packet  1 Packet Oral DAILY Matthew Perry P.A.-C.        And    [START ON 1/30/2025] magnesium hydroxide (Milk Of Magnesia) suspension 30 mL  30 mL Oral DAILY Matthew Perry P.A.-C.        And    bisacodyl (Dulcolax) suppository 10 mg  10 mg Rectal QDAY PRN Matthew Perry P.A.-C.        [START ON 1/29/2025] omeprazole (PriLOSEC) capsule 20 mg  20 mg Oral DAILY Matthew Perry P.A.-C.        mag hydrox-al hydrox-simeth (Maalox Plus Es Or Mylanta Ds) suspension 30 mL  30 mL Oral Q4HRS  "PRN Matthew Perry P.A.-C.        electrolyte-A (Plasmalyte-A) infusion   Intravenous PRN Matthew Perry P.A.-C.        ceFAZolin (Ancef) 2 g in  mL IVPB  2 g Intravenous Once Matthew Perry P.A.-C.         Facility-Administered Medications Ordered in Other Encounters   Medication Dose Route Frequency Provider Last Rate Last Admin    aminocaproic acid (Amicar) 10 g in  mL Infusion   Intravenous Intra-Op Continuous Abhishek Tud   5 g at 01/28/25 0857    dexamethasone (Decadron) injection   Intravenous PRN Abhishek Tud   20 mg at 01/28/25 0857    electrolyte-R (Normosol-R Ph 7.4) solution SOLN   Intravenous PRN Abhishek Tud   500 mL at 01/28/25 1223    mannitol 25 % injection   Intravenous PRN Abhishek Tud   20 g at 01/28/25 0857    ceFAZolin (Ancef) injection   Intravenous PRN Humberto Saleh M.D.   2 g at 01/28/25 1131    rocuronium (Zemuron) injection   Intravenous PRN Humberto Saleh M.D.   100 mg at 01/28/25 0946    lidocaine PF (Xylocaine-MPF) 2 % injection PF   Intravenous PRN Humberto Saleh M.D.   100 mg at 01/28/25 0757    aminocaproic acid (Amicar) IVPB 5 g   Intravenous Intra-Op Continuous Humberto Saleh M.D.   5 g at 01/28/25 0816    protamine injection   Intravenous PRN Humberto Saleh M.D.   400 mg at 01/28/25 1230       Allergies  Allergies   Allergen Reactions    Hydrocodone Unspecified     \"It makes me very sick\"  GI issues    Oxycodone Unspecified     \"It makes me very sick\"  GI upset       Vital Signs last 24 hours  Temp:  [36.7 °C (98 °F)] 36.7 °C (98 °F)  Pulse:  [58-69] 59  Resp:  [18-20] 20  BP: (111-125)/(74-77) 114/74  SpO2:  [98 %-100 %] 99 %    Physical Exam  Physical Exam  Vitals and nursing note reviewed.   Constitutional:       Appearance: He is ill-appearing.      Comments: Intubated and sedated post cardiac surgery   HENT:      Head: Normocephalic.      Mouth/Throat:      Mouth: Mucous membranes are moist.      Comments: ET in place  Eyes:      Pupils: Pupils are equal, round, and " reactive to light.   Neck:      Comments: Right IJ central line  Cardiovascular:      Rate and Rhythm: Normal rate.      Heart sounds: No murmur heard.  Pulmonary:      Effort: No respiratory distress.      Breath sounds: No stridor. No wheezing or rhonchi.      Comments: Coarse mechanical breath sounds, pacer wires, spot of blood on bandages, minimal CT output.   Abdominal:      General: There is no distension.      Palpations: There is no mass.      Tenderness: There is no abdominal tenderness.      Hernia: No hernia is present.      Comments: Prior scar to right abdomen   Musculoskeletal:         General: No swelling or tenderness.      Comments: Skin grafts marks to left thigh, right prior scars to forearm   Skin:     Coloration: Skin is pale. Skin is not jaundiced.   Neurological:      Comments: Sedated post operatively         Fluids    Intake/Output Summary (Last 24 hours) at 1/28/2025 1359  Last data filed at 1/28/2025 1134  Gross per 24 hour   Intake 49.6 ml   Output 1176 ml   Net -1126.4 ml       Laboratory  Recent Results (from the past 48 hours)   CBC WITH DIFFERENTIAL    Collection Time: 01/27/25 11:41 AM   Result Value Ref Range    WBC 5.9 4.8 - 10.8 K/uL    RBC 5.33 4.70 - 6.10 M/uL    Hemoglobin 14.9 14.0 - 18.0 g/dL    Hematocrit 45.8 42.0 - 52.0 %    MCV 85.9 81.4 - 97.8 fL    MCH 28.0 27.0 - 33.0 pg    MCHC 32.5 32.3 - 36.5 g/dL    RDW 47.3 35.9 - 50.0 fL    Platelet Count 281 164 - 446 K/uL    MPV 9.1 9.0 - 12.9 fL    Neutrophils-Polys 55.10 44.00 - 72.00 %    Lymphocytes 34.00 22.00 - 41.00 %    Monocytes 8.10 0.00 - 13.40 %    Eosinophils 1.70 0.00 - 6.90 %    Basophils 0.80 0.00 - 1.80 %    Immature Granulocytes 0.30 0.00 - 0.90 %    Nucleated RBC 0.00 0.00 - 0.20 /100 WBC    Neutrophils (Absolute) 3.25 1.82 - 7.42 K/uL    Lymphs (Absolute) 2.01 1.00 - 4.80 K/uL    Monos (Absolute) 0.48 0.00 - 0.85 K/uL    Eos (Absolute) 0.10 0.00 - 0.51 K/uL    Baso (Absolute) 0.05 0.00 - 0.12 K/uL     Immature Granulocytes (abs) 0.02 0.00 - 0.11 K/uL    NRBC (Absolute) 0.00 K/uL   Comp Metabolic Panel    Collection Time: 25 11:41 AM   Result Value Ref Range    Sodium 135 135 - 145 mmol/L    Potassium 4.4 3.6 - 5.5 mmol/L    Chloride 103 96 - 112 mmol/L    Co2 21 20 - 33 mmol/L    Anion Gap 11.0 7.0 - 16.0    Glucose 101 (H) 65 - 99 mg/dL    Bun 20 8 - 22 mg/dL    Creatinine 1.08 0.50 - 1.40 mg/dL    Calcium 8.8 8.5 - 10.5 mg/dL    Correct Calcium 8.9 8.5 - 10.5 mg/dL    AST(SGOT) 8 (L) 12 - 45 U/L    ALT(SGPT) 11 2 - 50 U/L    Alkaline Phosphatase 85 30 - 99 U/L    Total Bilirubin 0.3 0.1 - 1.5 mg/dL    Albumin 3.9 3.2 - 4.9 g/dL    Total Protein 6.6 6.0 - 8.2 g/dL    Globulin 2.7 1.9 - 3.5 g/dL    A-G Ratio 1.4 g/dL   PT    Collection Time: 25 11:41 AM   Result Value Ref Range    PT 13.2 12.0 - 14.6 sec    INR 1.00 0.87 - 1.13   APTT    Collection Time: 25 11:41 AM   Result Value Ref Range    APTT 28.9 24.7 - 36.0 sec   PreAdmit COD    Collection Time: 25 11:41 AM   Result Value Ref Range    ABO Grouping Only A     Rh Grouping Only NEG     Antibody Screen Scrn NEG    HEMOGLOBIN A1C    Collection Time: 25 11:41 AM   Result Value Ref Range    Glycohemoglobin 6.0 (H) 4.0 - 5.6 %    Est Avg Glucose 126 mg/dL   ESTIMATED GFR    Collection Time: 25 11:41 AM   Result Value Ref Range    GFR (CKD-EPI) 77 >60 mL/min/1.73 m 2   COMPONENT CELLULAR    Collection Time: 25 11:41 AM   Result Value Ref Range    Component R       R6                  Red Blood Cells6    [id number]   released     25   09:38      Product Type Red Blood Cells LR Pheresis     Dispense Status /Released     Unit Number (Barcoded) [id number]     Product Code (Barcoded) X2856M85     Blood Type (Barcoded) 0600    URINALYSIS    Collection Time: 25 11:42 AM    Specimen: Urine   Result Value Ref Range    Color Yellow     Character Clear     Specific Gravity 1.019 <1.035    Ph 6.0 5.0 - 8.0     Glucose Negative Negative mg/dL    Ketones Negative Negative mg/dL    Protein 100 (A) Negative mg/dL    Bilirubin Negative Negative    Urobilinogen, Urine 1.0 <=1.0 EU/dL    Nitrite Negative Negative    Leukocyte Esterase Negative Negative    Occult Blood Negative Negative    Micro Urine Req Microscopic    Urine Drug Screen    Collection Time: 25 11:42 AM   Result Value Ref Range    Amphetamines Urine Negative Negative    Barbiturates Negative Negative    Benzodiazepines Negative Negative    Cocaine Metabolite Negative Negative    Fentanyl, Urine Negative Negative    Methadone Negative Negative    Opiates Negative Negative    Oxycodone Negative Negative    Phencyclidine -Pcp Negative Negative    Propoxyphene Negative Negative    Cannabinoid Metab Negative Negative   S. Aureus By PCR, Nasal Complete    Collection Time: 25 11:42 AM    Specimen: Respirate   Result Value Ref Range    Staph aureus by PCR Negative Negative    MRSA by PCR Negative Negative   URINE MICROSCOPIC (W/UA)    Collection Time: 25 11:42 AM   Result Value Ref Range    WBC 0-2 /hpf    RBC 0-2 0 - 2 /hpf    Bacteria None Seen None /hpf    Epithelial Cells 0-2 0 - 5 /hpf    Urine Casts 0-2 0 - 2 /lpf   EKG    Collection Time: 25  5:24 PM   Result Value Ref Range    Report       [facility]    Test Date:  2025  Pt Name:    TARYN ELLINGTON                 Department: [facility]  MRN:        [id number]                      Room:  Gender:     Male                         Technician: REJI  :        1960                   Requested By:ANA JENKINS  Order #:    [id number]                    Reading MD: Brendan Bray MD    Measurements  Intervals                                Axis  Rate:       56                           P:          19  AK:         191                          QRS:        67  QRSD:       96                           T:          57  QT:         408  QTc:        394    Interpretive Statements  Sinus  bradycardia  Compared to ECG 11/22/2024 12:53:28  NO SIGNIFICANT CHANGES  Electronically Signed On 01- 17:24:58 PST by Brendan Bray MD     Histology Request    Collection Time: 01/28/25  9:47 AM   Result Value Ref Range    Pathology Request Sent to Histo    POCT arterial blood gas device results    Collection Time: 01/28/25  1:39 PM   Result Value Ref Range    Ph 7.389 7.350 - 7.450    Pco2 43.4 32.0 - 48.0 mmHg    Po2 252 (H) 83 - 108 mmHg    Tco2 28 (L) 32 - 48 mmol/L    S02 100 (H) 93 - 99 %    Hco3 26.2 21.0 - 28.0 mmol/L    BE 1 -4 - 3 mmol/L    Body Temp 96.2 F degrees    O2 Therapy 100 %    iPF Ratio 252     Ph Temp Dhaval 7.408 7.350 - 7.450    Pco2 Temp Co 40.9 32.0 - 48.0 mmHg    Po2 Temp Cor 246 (H) 83 - 108 mmHg    Specimen Arterial     Chava Test N/A     DelSys Vent     End Tidal Carbon Dioxide 27 mmhg    Peep End Expiratory Pressure 8 cmh20    Percent Minute Volume 160     Mode ASV    POCT sodium device results    Collection Time: 01/28/25  1:39 PM   Result Value Ref Range    Istat Sodium 142 135 - 145 mmol/L   POCT potassium device results    Collection Time: 01/28/25  1:39 PM   Result Value Ref Range    Istat Potassium 4.1 3.6 - 5.5 mmol/L   POCT ionized CA device results    Collection Time: 01/28/25  1:39 PM   Result Value Ref Range    Istat Ionized Calcium 1.22 1.10 - 1.30 mmol/L       Imaging  DX-CHEST-PORTABLE (1 VIEW)   Final Result      1. Appropriate positions of the life support lines and tubes.   2. Postsurgical changes in the sternum and mediastinum.   3. No pleural effusion or visible pneumothorax.      EC-SAUL W/O CONT             Assessment/Plan  Aneurysm of ascending aorta without rupture (HCC)- (present on admission)  Assessment & Plan  Patient is post cardiac surgery performed by: Dr Smith on 1/28 date.   Procedure: Bio-Bentall 29mm Konect Corrigan valved conduit and coronary reimplantation with ascending hemiarch repair with 32 mm tube graft  Will monitor hemodynamics  and titrate pressor and inotropic support.   Will follow chest tube output and correct coagulopathy.   Will monitor for post cardiac surgery complication such as myocardial dysfunction, bleeding, tamponade, graft dysfunction, arrhythmia, respiratory failure, neurologic, renal and infectious complication.    Will also monitor and treat stress hyperglycemia.       Encounter for weaning from ventilator ([facility])  Assessment & Plan  Intubated date: 1/28  Goal saturation > 92%  Monitor ventilator waveforms and titrate flow/peep and volumes according.   CXR: monitor lung volumes and tube/line placement  GI prophylaxis  Monitor for liberation with weaning protocols per CTS  Respiratory treatments: prn      Amphetamine abuse ([facility])  Assessment & Plan  Recommend continue cessation    Coronary artery disease due to lipid rich plaque- (present on admission)  Assessment & Plan  Continue aspirin and statin    Aortic insufficiency- (present on admission)  Assessment & Plan  Mild s/p AVR    Prediabetes- (present on admission)  Assessment & Plan  Stress hyperglycemia due to cardiac surgery  Insulin gtt per protocol    Hyperlipidemia- (present on admission)  Assessment & Plan  Continue statin        Discussed patient condition and risk of morbidity and/or mortality with RN, RT, Pharmacy, Charge nurse / hot rounds, and CVS.      The patient remains critically ill post cardiac surgery monitoring, titration of ventilator and clevidipine gtt.  Critical care time = 88 minutes in directly providing and coordinating critical care and extensive data review.  No time overlap and excludes procedures.

## 2025-01-28 NOTE — ASSESSMENT & PLAN NOTE
Patient is post cardiac surgery performed by: Dr Smith on 1/28 date.   Procedure: Bio-Bentall 29mm Konect Corrigan valved conduit and coronary reimplantation with ascending hemiarch repair with 32 mm tube graft  Will monitor hemodynamics and titrate pressor and inotropic support.   Will follow chest tube output and correct coagulopathy.   Will monitor for post cardiac surgery complication such as myocardial dysfunction, bleeding, tamponade, graft dysfunction, arrhythmia, respiratory failure, neurologic, renal and infectious complication.    Will also monitor and treat stress hyperglycemia.

## 2025-01-28 NOTE — OR SURGEON
OPERATIVE REPORT    Operation date: 1/28/2025    Referring physician: Melquiades Tarango MD    PreOp Diagnosis: Ascending aortic aneurysm (approximately 5.2 to 5.4 cm), mild aortic regurgitation, supraventricular tachycardia, dyslipidemia     PostOp Diagnosis: Ascending aortic aneurysm (approximately 5.2 to 5.4 cm), mild aortic regurgitation, supraventricular tachycardia, dyslipidemia     Procedure(s):  AORTIC ROOT REPLACEMENT (Bio-Bentall 29 mm Konect Corrigan valved conduit, coronary artery reimplantation), ascending aortic aneurysm hemiarch repair (32 mm tube graft), circulatory arrest (25 minutes) and intraoperative transesophageal echocardiography    Surgeon(s):  Yann Smith M.D.    Assistant:  Matthew Perry PA-C    Anesthesiologist/Type of Anesthesia:  Anesthesiologist: Humberto Saleh M.D.  Perfusionist: Abhishek Man/General    Surgical Staff:  Assistant: REBEKAH Chino; Matthew Perry P.A.-C.  Circulator: Kevin Zamora R.N.  Operating Room Assistant (ORA): Alvaro Day  Scrub Person: Anna Malone; Echo Pack R.N.    Specimens removed if any:  ID Type Source Tests Collected by Time Destination   A : AORTIC ANEURYSM Tissue Heart PATHOLOGY SPECIMEN Yann Smith M.D. 1/28/2025  9:47 AM    B : AORTIC VALVE Tissue Heart Valve PATHOLOGY SPECIMEN Yann Smith M.D. 1/28/2025  9:48 AM      Estimated Blood Loss: Minimal    Findings: Aortic root aneurysm greater than 5 cm, trace aortic regurgitation, proximal aortic arch posterior spontaneous rupture    Complications: None    Indications:  Mr. Vail is a 64-year-old male with a greater than 5 cm ascending aortic aneurysm, mild aortic regurgitation and good left ventricular ejection fraction of approximately 60%.    Procedure:  The patient was brought to the operating room and placed on the operating room table in the supine position.  After successful induction of general anesthesia endotracheal intubation, the patient was prepped and  draped in the usual sterile fashion.  Matthew Perry PA-C assisted with retraction and exposure during the operation and closed the sternal wound.  Intraoperative transesophageal echocardiography showed a greater than 4.9 cm aortic root aneurysm, trace aortic regurgitation and good left ventricular ejection fraction of approximately 60%.    An incision was made from the sternal notch to the xiphoid.  The sternum was opened longitudinally with a sternal saw.  Hemostasis was obtained with electrocautery at the sternal edges.  The patient was systemically heparinized.  The pericardium was opened longitudinally and tented anteriorly with Ethibond stay stitches.  A 22 Swedish femoral arterial cannula was then placed in the mid aortic arch in the usual fashion followed by a dual stage venous cannula.  An antegrade cardioplegia cannula was placed in the distal ascending aorta.  Cardiopulmonary bypass was instituted.  There was a spontaneous rupture in the proximal posterior arch of the aorta and dissection of the ascending aorta.  The bleeding was controlled with 2 pledgeted #4-0 Prolene sutures.  The aorta was crossclamped and the patient was given 1 L of cold [place] micro plegia in an antegrade fashion.  There was prompt cardiac arrest.  Ice slush was placed on the heart for further myocardial protection.  A phrenic nerve protector pad was used.  The patient was actively cooled to 22 °C nasopharyngeal and 26 °C bladder temperature.    The ascending aortic aneurysm was excised from the sinotubular junction to just below the aortic cross-clamp.  It was very large and thin-walled.  The coronary ostia were excised with an island of tissue around the orifice.  The aortic valve leaflets were excised.  Pledgeted #2-0 Ethibond stitches were then placed around the aortic valve annulus in a horizontal mattress fashion with the pledgets in the subannular position.  A 29 mm Corrigan Konect valved conduit (29 mm Inspiris bovine  pericardial valve and 32 mm tube graft) was then placed in the aortic valve annulus and secured in place with the Ethibond stitches utilizing the CorKnot device.  The coronary ostia were reanastomosed onto the tube graft using #6-0 Prolene sutures in a running fashion.  AC locators were placed at the proximal anastomoses.    When the patient reached 22 °C nasopharyngeal, circulatory arrest was initiated.  The dissected and ruptured the aorta was excised.  The posterior wall was very thin.  A hemiarch repair was performed using a beveled 32 mm tube graft and the proximal to mid arch using a #5-0 Prolene suture in a running fashion.  The aortic cross-clamp was reapplied onto the tube graft.  Cardiopulmonary bypass and active rewarming was initiated.  An end-to-end anastomosis between the tube graft ends was performed using a #5-0 Prolene suture in a running fashion.  Approximately 300 mL of warm blood was given in an antegrade fashion and the aortic cross-clamp was removed.  Aortic cross-clamp time was 127 minutes.  Total cardiopulmonary bypass time was 185 minutes.  Circulatory arrest time was 25 minutes.    A straight and an angled 32 Monegasque chest tubes were placed the mediastinum.  Temporary epicardial ventricular pacemaker wires were inserted.  The patient was electrically cardioverted into sinus rhythm with first-degree AV block.  The antegrade cardioplegia cannula was removed.  When he was adequately warmed, he was slowly taken off cardiopulmonary bypass which he tolerated well.  The dual stage venous cannula was removed.  Protamine was given to reverse the effects of the heparin.  The aortic cannula was removed.  When adequate hemostasis had been obtained, the sternum was reapproximated using size 5 sternal wires and the remainder of the incision was closed in several layers using Vicryl sutures.    Intraoperative transesophageal echocardiography showed a properly positioned and functioning aortic valve  bioprosthesis without any paravalvular or central leaks.  The mean aortic transvalvular gradient was 6 mmHg.  His left ventricular ejection fraction remained normal at approximately 60%.  There were no apparent complications.  The patient tolerated the procedure well and left the operating room in guarded condition.      1/28/2025 1:04 PM Yann Smith MD, FACS

## 2025-01-28 NOTE — ANESTHESIA PROCEDURE NOTES
SAUL    Date/Time: 1/28/2025 8:00 AM    Performed by: Humberto Saleh M.D.  Authorized by: Humberto Saleh M.D.    Start Time:1/28/2025 8:00 AM  Preanesthetic Checklist: patient identified, IV checked, site marked, risks and benefits discussed, surgical consent, monitors and equipment checked, pre-op evaluation and timeout performed    Indication for SAUL: diagnostic     Intubated: Yes  Bite Block: Yes  Heart Visualized: Yes  Insertion: atraumatic    **See FULL SAUL report in patient's chart via CV Synapse**

## 2025-01-28 NOTE — ASSESSMENT & PLAN NOTE
Intubated date: 1/28 extubated mild delay due to sleepiness  Goal saturation > 92%  He is getting IS 3700 ml no pain and doing well  Continue team supportive

## 2025-01-28 NOTE — ANESTHESIA PREPROCEDURE EVALUATION
Case: [id number] Date/Time: 01/28/25 0745    Procedures:       AORTIC ROOT REPLACEMENT, TRANESOPHAGEAL ECHOCARDIOGRAM (Chest)      ECHOCARDIOGRAM, TRANSESOPHAGEAL, INTRAOPERATIVE (Chest)    Anesthesia type: General    Pre-op diagnosis: ASCENDING AORTIC ANEURYSM    Location: [facility] OR 02 / SURGERY [facility]    Surgeons: Yann Smith M.D.            Relevant Problems   CARDIAC   (positive) Aneurysm of ascending aorta without rupture (HCC)   (positive) Aortic insufficiency   (positive) Coronary artery disease due to lipid rich plaque   (positive) SVT (supraventricular tachycardia) (HCC)       Physical Exam    Airway   Mallampati: II  TM distance: >3 FB  Neck ROM: full       Cardiovascular - normal exam  Rhythm: regular  Rate: normal  (-) murmur     Dental - normal exam           Pulmonary - normal exam  Breath sounds clear to auscultation     Abdominal    Neurological - normal exam                   Anesthesia Plan    ASA 3       Plan - general       Airway plan will be ETT  SAUL Planned        Induction: intravenous    Postoperative Plan: Postoperative administration of opioids is intended.    Pertinent diagnostic labs and testing reviewed    Informed Consent:    Anesthetic plan and risks discussed with patient.    Use of blood products discussed with: patient whom consented to blood products.

## 2025-01-28 NOTE — ANESTHESIA POSTPROCEDURE EVALUATION
Patient: Donovan Vail    Procedure Summary       Date: 01/28/25 Room / Location: [facility] 02 / SURGERY [facility]    Anesthesia Start: 0751 Anesthesia Stop: 1345    Procedures:       AORTIC ROOT REPLACEMENT (Chest)      ECHOCARDIOGRAM, TRANSESOPHAGEAL, INTRAOPERATIVE (Chest)      REPLACEMENT, AORTIC VALVE (Chest)      REPAIR, ANEURYSM , HEMIARCH, CIRCULATORY ARREST (Chest) Diagnosis: (ASCENDING AORTIC ANEURYSM, MILD AORTIC REGURGITATION)    Surgeons: Yann Smith M.D. Responsible Provider: Humberto Saleh M.D.    Anesthesia Type: general ASA Status: 3            Final Anesthesia Type: general  Last vitals  BP   Blood Pressure: 103/66, Arterial BP: 101/59    Temp   36.7 °C (98 °F)    Pulse   (!) 54   Resp   (!) 21    SpO2   98 %      Anesthesia Post Evaluation    Patient location during evaluation: PACU  Patient participation: complete - patient participated  Level of consciousness: awake and alert    Airway patency: patent  Anesthetic complications: no  Cardiovascular status: hemodynamically stable  Respiratory status: acceptable  Hydration status: euvolemic    PONV: none          No notable events documented.     Nurse Pain Score: 0 (NPRS)

## 2025-01-28 NOTE — PROGRESS NOTES
Pt. Not awake from OR, precedex drip has been off since arrival. No cough, corneal or gag reflex, Matthew Perry notified.

## 2025-01-29 ENCOUNTER — HOSPITAL ENCOUNTER (OUTPATIENT)
Dept: RADIOLOGY | Facility: MEDICAL CENTER | Age: 65
End: 2025-01-29

## 2025-01-29 LAB
ANION GAP SERPL CALC-SCNC: 11 MMOL/L (ref 7–16)
BUN SERPL-MCNC: 22 MG/DL (ref 8–22)
CA-I SERPL-SCNC: 1.1 MMOL/L (ref 1.1–1.3)
CALCIUM SERPL-MCNC: 8.2 MG/DL (ref 8.5–10.5)
CHLORIDE SERPL-SCNC: 106 MMOL/L (ref 96–112)
CO2 SERPL-SCNC: 19 MMOL/L (ref 20–33)
CREAT SERPL-MCNC: 0.97 MG/DL (ref 0.5–1.4)
ERYTHROCYTE [DISTWIDTH] IN BLOOD BY AUTOMATED COUNT: 45.6 FL (ref 35.9–50)
GFR SERPLBLD CREATININE-BSD FMLA CKD-EPI: 88 ML/MIN/1.73 M 2
GLUCOSE BLD STRIP.AUTO-MCNC: 104 MG/DL (ref 65–99)
GLUCOSE BLD STRIP.AUTO-MCNC: 106 MG/DL (ref 65–99)
GLUCOSE BLD STRIP.AUTO-MCNC: 110 MG/DL (ref 65–99)
GLUCOSE BLD STRIP.AUTO-MCNC: 113 MG/DL (ref 65–99)
GLUCOSE BLD STRIP.AUTO-MCNC: 114 MG/DL (ref 65–99)
GLUCOSE BLD STRIP.AUTO-MCNC: 120 MG/DL (ref 65–99)
GLUCOSE BLD STRIP.AUTO-MCNC: 129 MG/DL (ref 65–99)
GLUCOSE BLD STRIP.AUTO-MCNC: 132 MG/DL (ref 65–99)
GLUCOSE BLD STRIP.AUTO-MCNC: 133 MG/DL (ref 65–99)
GLUCOSE BLD STRIP.AUTO-MCNC: 135 MG/DL (ref 65–99)
GLUCOSE BLD STRIP.AUTO-MCNC: 135 MG/DL (ref 65–99)
GLUCOSE BLD STRIP.AUTO-MCNC: 138 MG/DL (ref 65–99)
GLUCOSE BLD STRIP.AUTO-MCNC: 138 MG/DL (ref 65–99)
GLUCOSE BLD STRIP.AUTO-MCNC: 144 MG/DL (ref 65–99)
GLUCOSE BLD STRIP.AUTO-MCNC: 145 MG/DL (ref 65–99)
GLUCOSE BLD STRIP.AUTO-MCNC: 154 MG/DL (ref 65–99)
GLUCOSE BLD STRIP.AUTO-MCNC: 155 MG/DL (ref 65–99)
GLUCOSE BLD STRIP.AUTO-MCNC: 158 MG/DL (ref 65–99)
GLUCOSE SERPL-MCNC: 114 MG/DL (ref 65–99)
HCT VFR BLD AUTO: 39.3 % (ref 42–52)
HGB BLD-MCNC: 13.4 G/DL (ref 14–18)
MCH RBC QN AUTO: 28 PG (ref 27–33)
MCHC RBC AUTO-ENTMCNC: 34.1 G/DL (ref 32.3–36.5)
MCV RBC AUTO: 82.2 FL (ref 81.4–97.8)
PLATELET # BLD AUTO: 161 K/UL (ref 164–446)
PMV BLD AUTO: 9.4 FL (ref 9–12.9)
POTASSIUM SERPL-SCNC: 4.5 MMOL/L (ref 3.6–5.5)
RBC # BLD AUTO: 4.78 M/UL (ref 4.7–6.1)
SODIUM SERPL-SCNC: 136 MMOL/L (ref 135–145)
WBC # BLD AUTO: 17.9 K/UL (ref 4.8–10.8)

## 2025-01-29 PROCEDURE — 82962 GLUCOSE BLOOD TEST: CPT

## 2025-01-29 PROCEDURE — 700102 HCHG RX REV CODE 250 W/ 637 OVERRIDE(OP)

## 2025-01-29 PROCEDURE — 700102 HCHG RX REV CODE 250 W/ 637 OVERRIDE(OP): Performed by: INTERNAL MEDICINE

## 2025-01-29 PROCEDURE — 700111 HCHG RX REV CODE 636 W/ 250 OVERRIDE (IP): Mod: JZ

## 2025-01-29 PROCEDURE — A9270 NON-COVERED ITEM OR SERVICE: HCPCS | Performed by: NURSE PRACTITIONER

## 2025-01-29 PROCEDURE — 71045 X-RAY EXAM CHEST 1 VIEW: CPT

## 2025-01-29 PROCEDURE — 82330 ASSAY OF CALCIUM: CPT

## 2025-01-29 PROCEDURE — 36415 COLL VENOUS BLD VENIPUNCTURE: CPT

## 2025-01-29 PROCEDURE — 94669 MECHANICAL CHEST WALL OSCILL: CPT

## 2025-01-29 PROCEDURE — 770022 HCHG ROOM/CARE - ICU (200)

## 2025-01-29 PROCEDURE — 700111 HCHG RX REV CODE 636 W/ 250 OVERRIDE (IP): Performed by: NURSE PRACTITIONER

## 2025-01-29 PROCEDURE — 80048 BASIC METABOLIC PNL TOTAL CA: CPT

## 2025-01-29 PROCEDURE — 99024 POSTOP FOLLOW-UP VISIT: CPT | Performed by: THORACIC SURGERY (CARDIOTHORACIC VASCULAR SURGERY)

## 2025-01-29 PROCEDURE — A9270 NON-COVERED ITEM OR SERVICE: HCPCS

## 2025-01-29 PROCEDURE — 85027 COMPLETE CBC AUTOMATED: CPT

## 2025-01-29 PROCEDURE — 700102 HCHG RX REV CODE 250 W/ 637 OVERRIDE(OP): Performed by: NURSE PRACTITIONER

## 2025-01-29 PROCEDURE — 99291 CRITICAL CARE FIRST HOUR: CPT | Performed by: INTERNAL MEDICINE

## 2025-01-29 RX ORDER — INSULIN LISPRO 100 [IU]/ML
2-9 INJECTION, SOLUTION INTRAVENOUS; SUBCUTANEOUS
Status: DISCONTINUED | OUTPATIENT
Start: 2025-01-29 | End: 2025-01-31

## 2025-01-29 RX ORDER — ROSUVASTATIN CALCIUM 10 MG/1
5 TABLET, COATED ORAL EVERY EVENING
Status: DISCONTINUED | OUTPATIENT
Start: 2025-01-29 | End: 2025-02-02 | Stop reason: HOSPADM

## 2025-01-29 RX ORDER — HYDRALAZINE HYDROCHLORIDE 20 MG/ML
10 INJECTION INTRAMUSCULAR; INTRAVENOUS EVERY 4 HOURS PRN
Status: DISCONTINUED | OUTPATIENT
Start: 2025-01-29 | End: 2025-01-29

## 2025-01-29 RX ORDER — AMLODIPINE BESYLATE 10 MG/1
5 TABLET ORAL
Status: DISCONTINUED | OUTPATIENT
Start: 2025-01-29 | End: 2025-01-30

## 2025-01-29 RX ORDER — DEXTROSE MONOHYDRATE 25 G/50ML
25 INJECTION, SOLUTION INTRAVENOUS
Status: DISCONTINUED | OUTPATIENT
Start: 2025-01-29 | End: 2025-01-31

## 2025-01-29 RX ORDER — HYDRALAZINE HYDROCHLORIDE 20 MG/ML
10 INJECTION INTRAMUSCULAR; INTRAVENOUS EVERY 4 HOURS PRN
Status: DISCONTINUED | OUTPATIENT
Start: 2025-01-29 | End: 2025-01-30

## 2025-01-29 RX ORDER — LABETALOL HYDROCHLORIDE 5 MG/ML
20 INJECTION, SOLUTION INTRAVENOUS EVERY 4 HOURS PRN
Status: DISCONTINUED | OUTPATIENT
Start: 2025-01-29 | End: 2025-01-30

## 2025-01-29 RX ORDER — LABETALOL HYDROCHLORIDE 5 MG/ML
20 INJECTION, SOLUTION INTRAVENOUS EVERY 4 HOURS PRN
Status: DISCONTINUED | OUTPATIENT
Start: 2025-01-29 | End: 2025-01-29

## 2025-01-29 RX ADMIN — TRAMADOL HYDROCHLORIDE 50 MG: 50 TABLET, COATED ORAL at 20:31

## 2025-01-29 RX ADMIN — LABETALOL HYDROCHLORIDE 20 MG: 5 INJECTION INTRAVENOUS at 15:27

## 2025-01-29 RX ADMIN — Medication 1 APPLICATOR: at 20:32

## 2025-01-29 RX ADMIN — INSULIN LISPRO 2 UNITS: 100 INJECTION, SOLUTION INTRAVENOUS; SUBCUTANEOUS at 11:34

## 2025-01-29 RX ADMIN — INSULIN LISPRO 2 UNITS: 100 INJECTION, SOLUTION INTRAVENOUS; SUBCUTANEOUS at 17:28

## 2025-01-29 RX ADMIN — Medication 1 APPLICATOR: at 10:44

## 2025-01-29 RX ADMIN — SENNOSIDES AND DOCUSATE SODIUM 2 TABLET: 50; 8.6 TABLET ORAL at 17:20

## 2025-01-29 RX ADMIN — LABETALOL HYDROCHLORIDE 20 MG: 5 INJECTION INTRAVENOUS at 20:31

## 2025-01-29 RX ADMIN — ACETAMINOPHEN 1000 MG: 500 TABLET ORAL at 11:24

## 2025-01-29 RX ADMIN — ENOXAPARIN SODIUM 40 MG: 100 INJECTION SUBCUTANEOUS at 17:21

## 2025-01-29 RX ADMIN — TRAMADOL HYDROCHLORIDE 50 MG: 50 TABLET, COATED ORAL at 05:41

## 2025-01-29 RX ADMIN — OMEPRAZOLE 20 MG: 20 CAPSULE, DELAYED RELEASE ORAL at 05:40

## 2025-01-29 RX ADMIN — LABETALOL HYDROCHLORIDE 20 MG: 5 INJECTION INTRAVENOUS at 11:25

## 2025-01-29 RX ADMIN — AMLODIPINE BESYLATE 5 MG: 10 TABLET ORAL at 10:44

## 2025-01-29 RX ADMIN — METOPROLOL TARTRATE 12.5 MG: 25 TABLET, FILM COATED ORAL at 17:19

## 2025-01-29 RX ADMIN — ROSUVASTATIN CALCIUM 5 MG: 10 TABLET, FILM COATED ORAL at 17:20

## 2025-01-29 RX ADMIN — TRAMADOL HYDROCHLORIDE 50 MG: 50 TABLET, COATED ORAL at 15:27

## 2025-01-29 RX ADMIN — ACETAMINOPHEN 1000 MG: 500 TABLET ORAL at 05:40

## 2025-01-29 RX ADMIN — MAGNESIUM SULFATE IN DEXTROSE 1 G: 10 INJECTION, SOLUTION INTRAVENOUS at 05:49

## 2025-01-29 RX ADMIN — ACETAMINOPHEN 1000 MG: 500 TABLET ORAL at 00:00

## 2025-01-29 RX ADMIN — POLYETHYLENE GLYCOL 3350 1 PACKET: 17 POWDER, FOR SOLUTION ORAL at 05:41

## 2025-01-29 RX ADMIN — ACETAMINOPHEN 1000 MG: 500 TABLET ORAL at 23:05

## 2025-01-29 RX ADMIN — METOPROLOL TARTRATE 12.5 MG: 25 TABLET, FILM COATED ORAL at 05:40

## 2025-01-29 RX ADMIN — HYDRALAZINE HYDROCHLORIDE 10 MG: 20 INJECTION, SOLUTION INTRAMUSCULAR; INTRAVENOUS at 23:04

## 2025-01-29 RX ADMIN — ASPIRIN 81 MG: 81 TABLET, COATED ORAL at 05:40

## 2025-01-29 RX ADMIN — SENNOSIDES AND DOCUSATE SODIUM 2 TABLET: 50; 8.6 TABLET ORAL at 05:40

## 2025-01-29 RX ADMIN — ACETAMINOPHEN 1000 MG: 500 TABLET ORAL at 17:19

## 2025-01-29 SDOH — ECONOMIC STABILITY: TRANSPORTATION INSECURITY
IN THE PAST 12 MONTHS, HAS THE LACK OF TRANSPORTATION KEPT YOU FROM MEDICAL APPOINTMENTS OR FROM GETTING MEDICATIONS?: NO

## 2025-01-29 SDOH — ECONOMIC STABILITY: TRANSPORTATION INSECURITY
IN THE PAST 12 MONTHS, HAS LACK OF RELIABLE TRANSPORTATION KEPT YOU FROM MEDICAL APPOINTMENTS, MEETINGS, WORK OR FROM GETTING THINGS NEEDED FOR DAILY LIVING?: NO

## 2025-01-29 ASSESSMENT — ENCOUNTER SYMPTOMS
HALLUCINATIONS: 0
VOMITING: 0
FOCAL WEAKNESS: 0
CLAUDICATION: 0
FEVER: 0
SORE THROAT: 0
NAUSEA: 0
SPUTUM PRODUCTION: 0
DEPRESSION: 0
NERVOUS/ANXIOUS: 0
ABDOMINAL PAIN: 0
EYE DISCHARGE: 0
ORTHOPNEA: 0
COUGH: 0
SHORTNESS OF BREATH: 0
DOUBLE VISION: 0
SENSORY CHANGE: 0
SPEECH CHANGE: 0
WEAKNESS: 0
TREMORS: 0

## 2025-01-29 ASSESSMENT — PAIN DESCRIPTION - PAIN TYPE
TYPE: ACUTE PAIN
TYPE: SURGICAL PAIN
TYPE: SURGICAL PAIN
TYPE: ACUTE PAIN
TYPE: SURGICAL PAIN
TYPE: SURGICAL PAIN
TYPE: ACUTE PAIN
TYPE: ACUTE PAIN
TYPE: SURGICAL PAIN
TYPE: ACUTE PAIN
TYPE: SURGICAL PAIN
TYPE: ACUTE PAIN
TYPE: ACUTE PAIN;SURGICAL PAIN
TYPE: SURGICAL PAIN
TYPE: ACUTE PAIN
TYPE: SURGICAL PAIN
TYPE: SURGICAL PAIN
TYPE: ACUTE PAIN

## 2025-01-29 ASSESSMENT — SOCIAL DETERMINANTS OF HEALTH (SDOH)
WITHIN THE LAST YEAR, HAVE TO BEEN RAPED OR FORCED TO HAVE ANY KIND OF SEXUAL ACTIVITY BY YOUR PARTNER OR EX-PARTNER?: NO
WITHIN THE PAST 12 MONTHS, THE FOOD YOU BOUGHT JUST DIDN'T LAST AND YOU DIDN'T HAVE MONEY TO GET MORE: NEVER TRUE
WITHIN THE LAST YEAR, HAVE YOU BEEN AFRAID OF YOUR PARTNER OR EX-PARTNER?: NO
WITHIN THE LAST YEAR, HAVE YOU BEEN HUMILIATED OR EMOTIONALLY ABUSED IN OTHER WAYS BY YOUR PARTNER OR EX-PARTNER?: NO
WITHIN THE PAST 12 MONTHS, YOU WORRIED THAT YOUR FOOD WOULD RUN OUT BEFORE YOU GOT THE MONEY TO BUY MORE: NEVER TRUE
IN THE PAST 12 MONTHS, HAS THE ELECTRIC, GAS, OIL, OR WATER COMPANY THREATENED TO SHUT OFF SERVICE IN YOUR HOME?: NO
WITHIN THE LAST YEAR, HAVE YOU BEEN KICKED, HIT, SLAPPED, OR OTHERWISE PHYSICALLY HURT BY YOUR PARTNER OR EX-PARTNER?: NO

## 2025-01-29 ASSESSMENT — COGNITIVE AND FUNCTIONAL STATUS - GENERAL
CLIMB 3 TO 5 STEPS WITH RAILING: A LOT
TURNING FROM BACK TO SIDE WHILE IN FLAT BAD: A LITTLE
WALKING IN HOSPITAL ROOM: A LOT
STANDING UP FROM CHAIR USING ARMS: A LITTLE
DRESSING REGULAR LOWER BODY CLOTHING: A LITTLE
MOVING FROM LYING ON BACK TO SITTING ON SIDE OF FLAT BED: A LITTLE
MOBILITY SCORE: 15
SUGGESTED CMS G CODE MODIFIER DAILY ACTIVITY: CK
MOVING TO AND FROM BED TO CHAIR: A LOT
TOILETING: A LITTLE
DRESSING REGULAR UPPER BODY CLOTHING: A LITTLE
DAILY ACTIVITIY SCORE: 19
SUGGESTED CMS G CODE MODIFIER MOBILITY: CK
HELP NEEDED FOR BATHING: A LITTLE
PERSONAL GROOMING: A LITTLE

## 2025-01-29 ASSESSMENT — PAIN SCALES - GENERAL: PAIN_LEVEL: 0

## 2025-01-29 ASSESSMENT — PATIENT HEALTH QUESTIONNAIRE - PHQ9
SUM OF ALL RESPONSES TO PHQ9 QUESTIONS 1 AND 2: 0
1. LITTLE INTEREST OR PLEASURE IN DOING THINGS: NOT AT ALL
2. FEELING DOWN, DEPRESSED, IRRITABLE, OR HOPELESS: NOT AT ALL

## 2025-01-29 ASSESSMENT — LIFESTYLE VARIABLES
TOTAL SCORE: 0
EVER HAD A DRINK FIRST THING IN THE MORNING TO STEADY YOUR NERVES TO GET RID OF A HANGOVER: NO
HOW MANY TIMES IN THE PAST YEAR HAVE YOU HAD 5 OR MORE DRINKS IN A DAY: 3
AVERAGE NUMBER OF DAYS PER WEEK YOU HAVE A DRINK CONTAINING ALCOHOL: 4
HAVE YOU EVER FELT YOU SHOULD CUT DOWN ON YOUR DRINKING: NO
EVER FELT BAD OR GUILTY ABOUT YOUR DRINKING: NO
DOES PATIENT WANT TO STOP DRINKING: NO
TOTAL SCORE: 0
TOTAL SCORE: 0
ALCOHOL_USE: YES
HAVE PEOPLE ANNOYED YOU BY CRITICIZING YOUR DRINKING: NO
CONSUMPTION TOTAL: POSITIVE
ON A TYPICAL DAY WHEN YOU DRINK ALCOHOL HOW MANY DRINKS DO YOU HAVE: 4

## 2025-01-29 ASSESSMENT — COPD QUESTIONNAIRES
COPD SCREENING SCORE: 4
DO YOU EVER COUGH UP ANY MUCUS OR PHLEGM?: NO/ONLY WITH OCCASIONAL COLDS OR INFECTIONS
HAVE YOU SMOKED AT LEAST 100 CIGARETTES IN YOUR ENTIRE LIFE: YES
DURING THE PAST 4 WEEKS HOW MUCH DID YOU FEEL SHORT OF BREATH: NONE/LITTLE OF THE TIME

## 2025-01-29 ASSESSMENT — FIBROSIS 4 INDEX
FIB4 SCORE: 0.93
FIB4 SCORE: 0.93

## 2025-01-29 NOTE — PROGRESS NOTES
Discussed BP parameters with MABLE Perry. This morning, patient's SBP >120, PRN hydralazine and labetalol ordered.     Also discussed patient's HR sustaining 50s-70s overnight after pausing amio drip due to bradycardia. Order received to discontinue amiodarone orders at this time.

## 2025-01-29 NOTE — PROGRESS NOTES
Cardiovascular Surgery Progress Note    Name: Donovan Vail  MRN: [id number]  : 1960  Admit Date: 2025  5:10 AM  1 Day Post-Op     Procedure:  Procedure(s) and Anesthesia Type:     * AORTIC ROOT REPLACEMENT (Bio-Bentall 29 mm Konect Corrigan valved conduit, coronary artery reimplantation), ascending aortic aneurysm hemiarch repair (32 mm tube graft), circulatory arrest (25 minutes) and intraoperative transesophageal echocardiography     Vitals:  Vitals:    25 0629 25 0700 25 0709 25 0800   BP:  (P) 125/75  (P) 127/81   Pulse: 72 (!) (P) 59 (!) 58 (P) 61   Resp: (!) 10 (P) 15 15 (P) 14   Temp:       TempSrc:       SpO2:  (P) 97% 97% (P) 96%   Weight:       Height:          Temp (24hrs), Av.8 °C (96.5 °F), Min:33.8 °C (92.8 °F), Max:36.8 °C (98.2 °F)      Respiratory:  Vent Mode: Spont, PEEP/CPAP: 8, PIP: 16, MAP: 9.4 Respiration: (P) 14, Pulse Oximetry: (P) 96 %       Fluids:    Intake/Output Summary (Last 24 hours) at 2025 0956  Last data filed at 2025 0800  Gross per 24 hour   Intake 1510.26 ml   Output 2300 ml   Net -789.74 ml     Admit weight: Weight: 88.5 kg (195 lb 1.7 oz)  Current weight: Weight: 88.1 kg (194 lb 3.6 oz) (25 0500)    Labs:  Recent Labs     25  1141 25  1339 25  0435   WBC 5.9  --  17.9*   RBC 5.33  --  4.78   HEMOGLOBIN 14.9 13.5* 13.4*   HEMATOCRIT 45.8 39.5* 39.3*   MCV 85.9  --  82.2   MCH 28.0  --  28.0   MCHC 32.5  --  34.1   RDW 47.3  --  45.6   PLATELETCT 281 156* 161*   MPV 9.1  --  9.4     Recent Labs     25  1141 25  1339 25  1738 25  2240 25  0435   SODIUM 135  --   --   --  136   POTASSIUM 4.4   < > 4.1 4.7 4.5   CHLORIDE 103  --   --   --  106   CO2 21  --   --   --  19*   GLUCOSE 101*  --   --   --  114*   BUN 20  --   --   --  22   CREATININE 1.08  --   --   --  0.97   CALCIUM 8.8  --   --   --  8.2*    < > = values in this interval not displayed.     Recent Labs      01/27/25  1141 01/28/25  1339   APTT 28.9 36.2*   INR 1.00 1.44*       HgbA1c:  6.0    Diabetic Educator Consult:  N/A    Medications:  Scheduled Medications   Medication Dose Frequency    insulin lispro  2-9 Units 4X/DAY ACHS    enoxaparin (LOVENOX) injection  40 mg DAILY AT 1800    Nozin nasal  swab  1 Applicator BID    magnesium sulfate  1 g DAILY    K+ Scale: Goal of 4.5  1 Each Q6HRS    metoprolol tartrate  12.5 mg BID    Followed by    [START ON 1/30/2025] metoprolol tartrate  25 mg BID    aspirin  81 mg DAILY    Pharmacy Consult Request  1 Each PHARMACY TO DOSE    acetaminophen  1,000 mg Q6HRS    senna-docusate  2 Tablet BID    And    polyethylene glycol/lytes  1 Packet DAILY    And    [START ON 1/30/2025] magnesium hydroxide  30 mL DAILY    omeprazole  20 mg DAILY        Exam:   Physical Exam  Vitals and nursing note reviewed.   Constitutional:       General: He is not in acute distress.     Appearance: Normal appearance.   HENT:      Head: Normocephalic.      Right Ear: External ear normal.      Left Ear: External ear normal.      Nose: Nose normal. No congestion.      Mouth/Throat:      Mouth: Mucous membranes are moist.      Pharynx: Oropharynx is clear.   Eyes:      Extraocular Movements: Extraocular movements intact.      Pupils: Pupils are equal, round, and reactive to light.   Cardiovascular:      Rate and Rhythm: Normal rate and regular rhythm.      Pulses: Normal pulses.      Heart sounds: Normal heart sounds.   Pulmonary:      Effort: Pulmonary effort is normal.      Breath sounds: Decreased breath sounds present.   Abdominal:      General: Bowel sounds are decreased. There is no distension.      Palpations: Abdomen is soft.   Musculoskeletal:      Cervical back: Normal range of motion and neck supple. No tenderness.      Right lower leg: Edema present.      Left lower leg: Edema present.   Skin:     General: Skin is warm and dry.      Comments: Midline surgical incision   Neurological:       General: No focal deficit present.      Mental Status: He is alert and oriented to person, place, and time. Mental status is at baseline.   Psychiatric:         Mood and Affect: Mood normal.         Behavior: Behavior normal.         Thought Content: Thought content normal.         Cardiac Medications:    ASA - Yes    Plavix - No; contraindicated because of Other aortic root    Post-operative Beta Blockers - Yes    Ace/ARB- No; contraindicated because of Normal EF    ARNI-  No; contraindicated because of Normal EF    Statin - No; contraindicated because of No CAD    Aldactone- No; contraindicated because of Normal EF    SGLT2i-  No; contraindicated because of Normal EF    Ejection Fraction:  65%    Telemetry:   1/29 a fib overnight, now SB/SR    Assessment/Plan:  POD 1  HTN, a fib last night- amio d/c'd due to conversion to SB, neuro intact, wounds intact, abdomen soft, fluid balance negative, wt stable,  2 L NC.  Plan:  Keep chest tubes and pacing wires. Add amlodipine. IS/ambulate.    Disposition:  D

## 2025-01-29 NOTE — DISCHARGE INSTR - DIET
Heart-Healthy Nutrition Therapy    A heart-healthy diet is recommended to reduce your unhealthy blood cholesterol levels, manage high blood pressure, and lower your risk for heart disease.    To follow a heart-healthy diet,    Eat a balanced diet with whole grains, fruits and vegetables, and lean protein sources.  Achieve and maintain a healthy weight.  Choose heart-healthy unsaturated fats. Limit saturated fats, trans fats, and cholesterol intake. Eat more plant-based or vegetarian meals using beans and soy foods for protein.  Eat whole, unprocessed foods to limit the amount of sodium (salt) you eat.  Limit refined carbohydrates especially sugar, sweets and sugar-sweetened beverages.  If you drink alcohol, do so in moderation: one serving per day (women) and two servings per day (men).  One serving is equivalent to 12 ounces beer, 5 ounces wine, or 1.5 ounces distilled spirits    Tips for Choosing Heart-Healthy Fats    Choose lean protein and low-fat dairy foods to reduce saturated fat intake.    Saturated fat is usually found in animal-based protein and is associated with certain health risks. Saturated fat is the biggest contributor to raised low-density lipoprotein (LDL) cholesterol levels in the diet. Research shows that limiting saturated fat lowers unhealthy cholesterol levels. Eat no more than 5-6% of your total calories each day from saturated fat. Ask your registered dietitian nutritionist (RDN) to help you determine how much saturated fat is right for you.  There are many foods that do not contain large amounts of saturated fats. Swapping these foods to replace foods high in saturated fats will help you limit the saturated fat you eat and improve your cholesterol levels. You can also try eating more plant-based or vegetarian meals.        Avoid trans fats    Trans fats increase levels of LDL-cholesterol. Hydrogenated fat in processed foods is the main source of trans fats in foods.   Trans fats can be  found in stick margarine, shortening, processed sweets, baked goods, some fried foods, and packaged foods made with hydrogenated oils. Avoid foods with “partially hydrogenated oil” on the ingredient list such as: cookies, pastries, baked goods, biscuits, crackers, microwave popcorn, and frozen dinners.    Choose foods with heart healthy fats    Polyunsaturated and monounsaturated fat are unsaturated fats that may help lower your blood cholesterol level when used in place of saturated fat in your diet.  Ask your RDN about taking a dietary supplement with plant sterols and stanols to help lower your cholesterol level.  Research shows that substituting saturated fats with unsaturated fats is beneficial to cholesterol levels. Try these easy swaps:      Limit the amount of cholesterol you eat to less than 200 milligrams per day.    Cholesterol is a substance carried through the bloodstream via lipoproteins, which are known as “transporters” of fat. Some body functions need cholesterol to work properly, but too much cholesterol in the bloodstream can damage arteries and build up blood vessel linings (which can lead to heart attack and stroke). You should eat less than 200 milligrams cholesterol per day.  People respond differently to eating cholesterol. There is no test available right now that can figure out which people will respond more to dietary cholesterol and which will respond less. For individuals with high intake of dietary cholesterol, different types of increase (none, small, moderate, large) in LDL-cholesterol levels are all possible.    Food sources of cholesterol include egg yolks and organ meats such as liver, gizzards.  Limit egg yolks to two to four per week and avoid organ meats like liver and gizzards to control cholesterol intake.    Tips for Choosing Heart-Healthy Carbohydrates    Consume foods rich in viscous (soluble) fiber    Viscous, or soluble, fiber is found in the walls of plant cells. Viscous  fiber is found only in plant-based foods--animal-based foods like meat or dairy products do not contain fiber. In the stomach, viscous fibers absorb water and swell to form a thick, jelly-like mass. This helps to lower your unhealthy cholesterol  Rich sources of viscous fiber include asparagus, [place] sprouts, sweet potatoes, turnips, apricots, mangoes, oranges, legumes, barley, oats, and oat bran.  Eat at least 5 to 10 grams of viscous fiber each day. As you increase your fiber intake gradually, also increase the amount of water you drink. This will help prevent constipation.  If you have difficulty achieving this goal, ask your RDN about fiber laxatives. Choose fiber supplements made with viscous fibers such as psyllium seed husks or methylcellulose to help lower unhealthy cholesterol.    Limit refined carbohydrates    There are three types of carbohydrates: starches, sugar, and fiber. Some carbohydrates occur naturally in food, like the starches in rice or corn or the sugars in fruits and milk. Refined carbohydrates--foods with high amounts of simple sugars--can raise triglyceride levels. High triglyceride levels are associated with coronary heart disease.  Some examples of refined carbohydrate foods are table sugar, sweets, and beverages sweetened with added sugar.    Tips for Reducing Sodium (Salt)  Although sodium is important for your body to function, too much sodium can be harmful for people with high blood pressure.  As sodium and fluid buildup in your tissues and bloodstream, your blood pressure increases. High blood pressure may cause damage to other organs and increase your risk for a stroke.    Keep your salt intake to 2300 milligrams or less per day. Even if you take a pill for blood pressure or a water pill (diuretic) to remove fluid, it is still important to have less salt in your diet. Ask your RDN what amount of sodium is right for you.    Avoid processed foods.  Eat more fresh foods.  Fresh  "fruits and vegetables are naturally low in sodium, as well as frozen vegetables and fruits that have no added juices or sauces.  Fresh meats are lower in sodium than processed meats, such as farrell, sausage, and hotdogs.  Read the nutrition label or ask your  to help you find a fresh meat that is low in sodium.  Eat less salt--at the table and when cooking.  A single teaspoon of table salt has 2,300 mg of sodium.  Leave the salt out of recipes for pasta, casseroles, and soups.  Ask your RDN how to cook your favorite recipes without sodium  Be a smart .  Look for food packages that say “salt-free” or “sodium-free.” These items contain less than 5 milligrams of sodium per serving.  “Very low-sodium” products contain less than 35 milligrams of sodium per serving.  “Low-sodium” products contain less than 140 milligrams of sodium per serving.   Beware of “reduced salt” or \"reduced sodium\" products.  These items may still be high in sodium. Check the nutrition label.   Add flavors to your food without adding sodium.  Try lemon juice, lime juice, fruit juice or vinegar.    Dry or fresh herbs add flavor. Try basil, bay leaf, dill, rosemary, parsley, beverly, dry mustard, nutmeg, thyme, and paprika.  Pepper, red pepper flakes, and cayenne pepper can add spice to your meals without adding sodium. Hot sauce contains sodium, but if you use just a drop or two, it will not add up to much.  Buy a sodium-free seasoning blend or make your own at home.     Additional Lifestyle Tips    Achieve and maintain a healthy weight.  Talk with your RDN or your doctor about what is a healthy weight for you.  Set goals to reach and maintain that weight.   To lose weight, reduce your calorie intake along with increasing your physical activity. A weight loss of 10 to 15 pounds could reduce LDL-cholesterol by 5 milligrams per deciliter.  Participate in physical activity.    Talk with your health care team to find out what types of " physical activity are best for you. Set a plan to get about 30 minutes of exercise on most days.          Nutrition Counseling  Our expert team offers:   Medical Nutrition Therapy for Chronic Conditions   Weight Management   Diabetes Education and Management   Wellness Services   Body Composition Measurements   Gastrointestinal Health    N[facility] Services are located at:  [address], [place] [address]  For more information and to schedule a consultation, please call [phone].  A physician referral may be required by your insurance for coverage.

## 2025-01-29 NOTE — PROGRESS NOTES
MABLE Perry was notified of patient's HR up to 140s. BP stable. STAT EKG ordered as well as amio protocol, magnesium lab, and iCal lab.

## 2025-01-29 NOTE — CARE PLAN
Problem: Hyperinflation  Goal: Prevent or improve atelectasis  Description: Target End Date:  3 to 4 days    1. Instruct incentive spirometry usage  2.  Perform hyperinflation therapy as indicated  Outcome: Progressing   PEP QID  IS 3000

## 2025-01-29 NOTE — PROGRESS NOTES
Patient meeting all extubation parameters, follows commands, ROTH,  ABG WNL.     RSBI ** (<100)  Pinsp **(< or =15)  RR ** (< or=20)  PEEP **(<10)  FiO2 **%(<or=50%)  NIF ** (more negative than -25)  VC ** (10-15 mL/kg)  PACO2 3** (<45)  pH ** (>7.3)  SpO2 ** (>92%)     Hemodynamically stable     Extubation time 2325  Total time intubated** hours ** minutes    Patient extubated at 2325 by RT, placed on 4L NC oxygen saturation 97%. No stridor.

## 2025-01-29 NOTE — CARE PLAN
The patient is Unstable - High likelihood or risk of patient condition declining or worsening    Shift Goals  Clinical Goals: Stable hemodynamics, Extubate  Patient Goals: NIKA  Family Goals: Updates    Progress made toward(s) clinical / shift goals:    Problem: Day of surgery post CABG/Heart valve replacement  Goal: Stabilization in immediate post op period  Outcome: Progressing  Intervention: VS q 15 min x 4 hours, then q 1 hour. Include temperature immediately upon arrival. Check CO/CI q 2-4 hours and PRN  Note: Completed per protocol  Intervention: If radial artery used, elevate arm, no BP checks or needle sticks from affected arm, monitor ulnar pulse and capillary refill  Note: NA  Intervention: First post op hour labs and EKG per order  Note: Completed  Intervention: Serum K q 6 hours x 24 hours.  ABG and CBC prn.  Note: Completed  Intervention: Initiate post cardiac insulin infusion protocol orders for FSBS greater than 140 and check frequency per protocol  Note: Insulin infusion per protocol  Intervention: FSBS frequency as per Cardiac Surgery Insulin Drip Protocol  Note: FSBS per protocol  Intervention: For patients on Beta Blockers: verify dose given prior to surgery or within 6 hours after arrival to the unit  Note: Pt. Received beta blocker in pre-op, scheduled to get metoprolol.  Intervention: Chest tube to 20 cm suction, record CT drainage with VS, and check for air leak  Note: CT to suction no air leak  Intervention: For CT drainage >300 mL in first hour post op and/or 150 mL in subsequent hours: Stat platelets, PT, INR, TEG, iSTAT, and H&H per order  Note: NA  Intervention: Titrate and wean off vasoactive drips per patient's condition and per MD order while maintaining SBP  mmHg per MD order  Note: Titrated per protocol  Intervention: VAP protocol in place  Note: Vap in place  Intervention: Wean from Vent per protocol (see protocol), extubation goal within 6 hours post op  Note: Pt. Not likely  to extubate within 6 hours, REDDY Perry notified  Intervention: IS q 1 hour while awake post extubation  Note: NA  Intervention: Bedrest until extubated and groin lines out  Note: Pt. Still intubated  Intervention: Dangle within 4 hours post extubation  Note: NA  Intervention: Up in chair 4 hours, day of extubation  Note: NA  Intervention: Maintain all original surgical dressings per provider orders and specifications  Note: Completed  Intervention: Clear liquids post extubation, order carbohydrate free (post cardiac surgery) diet, advance as tolerated  Note: NA  Intervention: Discontinue [place] monica and arterial line 12-18 hours post op if hemodynamically stable and off vasoactive drips  Note: NA  Intervention: A-Fib and DVT prophylaxis per MD order or contraindications documented (refer to DVT/VTE problem on Care Plan)  Note: SCD's on for DVT prophylaxis  Intervention: Amiodarone protocol per MD order  Note: NA     Problem: Hemodynamics  Goal: Patient's hemodynamics, fluid balance and neurologic status will be stable or improve  Outcome: Progressing     Problem: Pain - Standard  Goal: Alleviation of pain or a reduction in pain to the patient’s comfort goal  Outcome: Progressing       Patient is not progressing towards the following goals:

## 2025-01-29 NOTE — CARE PLAN
The patient is Watcher - Medium risk of patient condition declining or worsening    Shift Goals  Clinical Goals: Hemodynamic stability, wean ventilator to extubate, mobilize  Patient Goals: NIKA  Family Goals: NIKA    Progress made toward(s) clinical / shift goals:    Problem: Knowledge Deficit - Standard  Goal: Patient and family/care givers will demonstrate understanding of plan of care, disease process/condition, diagnostic tests and medications  Outcome: Progressing     Problem: Skin Integrity  Goal: Skin integrity is maintained or improved  Outcome: Progressing     Problem: Fall Risk  Goal: Patient will remain free from falls  Outcome: Progressing     Problem: Day of surgery post CABG/Heart valve replacement  Goal: Stabilization in immediate post op period  Outcome: Progressing  Intervention: VS q 15 min x 4 hours, then q 1 hour. Include temperature immediately upon arrival. Check CO/CI q 2-4 hours and PRN  Note: Completed per protocol and documented in flowsheets. CO/CI not applicable.   Intervention: First post op hour labs and EKG per order  Note: Completed per day team.   Intervention: Serum K q 6 hours x 24 hours.  ABG and CBC prn.  Note: Completed and replenished as needed.   Intervention: Initiate post cardiac insulin infusion protocol orders for FSBS greater than 140 and check frequency per protocol  Note: Titrated off insulin gtt overnight for blood sugars 110-130s.   Intervention: FSBS frequency as per Cardiac Surgery Insulin Drip Protocol  Note: Completed per protocol   Intervention: Chest tube to 20 cm suction, record CT drainage with VS, and check for air leak  Note: Chest tubes to -20 mmHg suction. CT output adequate. No air leak identified.  Intervention: For CT drainage >300 mL in first hour post op and/or 150 mL in subsequent hours: Stat platelets, PT, INR, TEG, iSTAT, and H&H per order  Note: N/A  Intervention: Titrate and wean off vasoactive drips per patient's condition and per MD order while  maintaining SBP  mmHg per MD order  Note: Patient remained off vasoactive drips overnight.   Intervention: VAP protocol in place  Note: Completed per protocol.   Intervention: Wean from Vent per protocol (see protocol), extubation goal within 6 hours post op  Note: Completed per protocol. Patient was extubated at 2325.  Intervention: IS q 1 hour while awake post extubation  Note: Completed. Best IS 3250  Intervention: Bedrest until extubated and groin lines out  Note: Completed.   Intervention: Dangle within 4 hours post extubation  Note: Patient sat edge of bed for 5 minutes post-extubation with assistance from nursing staff.   Intervention: Up in chair 4 hours, day of extubation  Note: Patient up to chair in morning.   Intervention: Maintain all original surgical dressings per provider orders and specifications  Note: Surgical dressings remain clean, dry, and intact.  Intervention: Clear liquids post extubation, order carbohydrate free (post cardiac surgery) diet, advance as tolerated  Note: Clear liquid diet ordered per protocol.   Intervention: Discontinue [place] monica and arterial line 12-18 hours post op if hemodynamically stable and off vasoactive drips  Note: Not applicable.   Intervention: A-Fib and DVT prophylaxis per MD order or contraindications documented (refer to DVT/VTE problem on Care Plan)  Note: Not applicable.   Intervention: Amiodarone protocol per MD order  Note: Amiodarone ordered and discontinued on shift. See progress notes.     Problem: Self Care  Goal: Patient will have the ability to perform ADLs independently or with assistance (bathe, groom, dress, toilet and feed)  Outcome: Progressing     Problem: Pain - Standard  Goal: Alleviation of pain or a reduction in pain to the patient’s comfort goal  Outcome: Progressing

## 2025-01-29 NOTE — PROGRESS NOTES
Critical Care Progress Note    Date of admission  1/28/2025    Chief Complaint  64 y.o. male admitted 1/28/2025 with  hx of tobacco abuse, chol, CAD, necrotizing fasciitis, skin grafts,  tobacco abuse, methamphetamine abuse and tested + on prior surgery so was canceled. That has had 5.4 cm aortic aneurysm. He today underwent planned root replacement. Shortly after circulatory arrest and starting cardiac bypass patient had spontaneous rupture of his aneurysm. He underwent the rest of his surgery uneventful with Bio-Bentall 29mm Konect Corrigan valved conduit and coronary reimplantation with ascending hemiarch repair with 32 mm tube graft. His post BiV function was normal and trace mild MR, was on insulin gtt, and clevi gtt upon arrival to ICU. He received 2.5L of crystalloid, 550ml of cell saver. He should be good for early extubation protocol pending his ICU course.     Hospital Course  1/28 POD #0 from cardiac surgery    Interval Problem Update  Reviewed last 24 hour events:  Neuro: neuro intact aox4, no pain  HR: 's sinus   SBP:   Tmax: afebrile  GI: npo -> clear liquid, BM pta  UOP: 2.1L  Lines: central line, nyasia cano, CT 226ml  Resp: 2l n/c, IS 3750  Vte: lovenox  PPI/H2:ppi  Antibx: none  -309ml net -1.9L   Insulin gtt off transition off    Review of Systems  Review of Systems   Constitutional:  Negative for fever and malaise/fatigue.   HENT:  Negative for hearing loss and sore throat.    Eyes:  Negative for double vision and discharge.   Respiratory:  Negative for cough, sputum production and shortness of breath.    Cardiovascular:  Negative for chest pain, orthopnea, claudication and leg swelling.   Gastrointestinal:  Negative for abdominal pain, nausea and vomiting.   Genitourinary:  Negative for dysuria.   Neurological:  Negative for tremors, sensory change, speech change, focal weakness and weakness.   Psychiatric/Behavioral:  Negative for depression and hallucinations. The patient is not  nervous/anxious.         Vital Signs for last 24 hours   Temp:  [33.8 °C (92.8 °F)-36.8 °C (98.2 °F)] 36.7 °C (98.1 °F)  Pulse:  [] 65  Resp:  [10-29] 15  BP: ()/(53-86) 144/86  SpO2:  [94 %-100 %] 98 %    Hemodynamic parameters for last 24 hours  CVP:  [0 MM HG-156 MM HG] (P) 153 MM HG    Respiratory Information for the last 24 hours  Vent Mode: Spont  PEEP/CPAP: 8  MAP: 9.4  Control VTE (exp VT): 699    Physical Exam   Physical Exam  Vitals and nursing note reviewed.   Constitutional:       General: He is not in acute distress.     Appearance: He is not ill-appearing.      Comments: Neuro intact   HENT:      Head: Normocephalic.      Nose: Nose normal.      Mouth/Throat:      Mouth: Mucous membranes are moist.   Eyes:      Pupils: Pupils are equal, round, and reactive to light.   Neck:      Comments: Right ij central line  Cardiovascular:      Rate and Rhythm: Normal rate.      Heart sounds: No murmur heard.  Pulmonary:      Effort: No respiratory distress.      Breath sounds: No stridor. No wheezing or rhonchi.      Comments: CT's in place, dressing with one spot of blood, v wires  Abdominal:      General: There is no distension.      Palpations: There is no mass.      Tenderness: There is no abdominal tenderness.      Hernia: No hernia is present.   Musculoskeletal:         General: No swelling or tenderness.      Cervical back: No rigidity or tenderness.   Skin:     Coloration: Skin is pale. Skin is not jaundiced.   Neurological:      General: No focal deficit present.      Mental Status: He is oriented to person, place, and time.      Cranial Nerves: No cranial nerve deficit.      Sensory: No sensory deficit.      Motor: No weakness.      Coordination: Coordination normal.   Psychiatric:         Mood and Affect: Mood normal.         Medications  Current Facility-Administered Medications   Medication Dose Route Frequency Provider Last Rate Last Admin    hydrALAZINE (Apresoline) injection 10 mg  10  mg Intravenous Q4HRS PRN ALEKSEY AparicioA.-C.        labetalol (Normodyne/Trandate) injection 20 mg  20 mg Intravenous Q4HRS PRN ALEKSEY AparicioA.-C.        insulin lispro (HumaLOG,AdmeLOG) subcutaneous injection  2-9 Units Subcutaneous 4X/DAY LUCIO Huerta M.D.        And    dextrose 50% (D50W) injection 25 g  25 g Intravenous Q15 MIN PRN Jj Huerta M.D.        norepinephrine (Levophed) 8 mg in 250 mL NS infusion (premix)  0-1 mcg/kg/min (Ideal) Intravenous Continuous RADHA Aparicio.A.-C.   Dose not Required at 01/28/25 1400    EPINEPHrine (Adrenalin) infusion 4 mg/250 mL (premix)  0-0.5 mcg/kg/min (Ideal) Intravenous Continuous RADHA Aparicio.A.-C.   Dose not Required at 01/28/25 1400    Respiratory Therapy Consult   Nebulization Continuous RT RADHA Aparicio.A.-C.        NS infusion   Intravenous Continuous RADHA Aparicio.A.-C.   Stopped at 01/29/25 0509    NS infusion   Intravenous Continuous RADHA Aparicio.A.-C.   Stopped at 01/29/25 0413    enoxaparin (Lovenox) inj 40 mg  40 mg Subcutaneous DAILY AT 1800 RADHA Aparicio.A.-C.        Nozin nasal  swab  1 Applicator Each Nostril BID ALVARO Aparicio.STELLA.   1 Applicator at 01/28/25 2016    calcium CHLORIDE 10 % 1,000 mg in dextrose 5% 100 mL IVPB  1,000 mg Intravenous Once PRN RADHA Aparicio.A.-C.        calcium CHLORIDE 10 % 1,000 mg in dextrose 5% 100 mL IVPB  1,000 mg Intravenous Once PRN ALEKSEY AparicioA.-C.        magnesium sulfate in D5W IVPB premix 1 g  1 g Intravenous DAILY RADHA Aparicio.A.-C.   Stopped at 01/29/25 0649    K+ Scale: Goal of 4.5  1 Each Intravenous Q6HRS ALEKSEY AparicioA.-C.   1 Each at 01/28/25 1800    metoprolol tartrate (Lopressor) tablet 12.5 mg  12.5 mg Oral BID Matthew Perry P.A.-C.   12.5 mg at 01/29/25 0540    Followed by    [START ON 1/30/2025] metoprolol tartrate (Lopressor) tablet 25 mg  25 mg Oral BID Matthew Perry P.A.-C.        aspirin EC tablet 81 mg  81 mg Oral DAILY Matthew Perry P.A.-C.    81 mg at 01/29/25 0540    clevidipine (Cleviprex) IV emulsion  0-21 mg/hr Intravenous Continuous ALEKSEY AparicioA.-C.   Stopped at 01/28/25 1456    nitroglycerin 50 mg in D5W 250 ml infusion  0-100 mcg/min Intravenous Continuous ALEKSEY AparicioA.-C.   Dose not Required at 01/28/25 1400    Pharmacy Consult Request ...Pain Management Review 1 Each  1 Each Other PHARMACY TO DOSE ALVARO Aparicio.-C.        acetaminophen (Tylenol) tablet 1,000 mg  1,000 mg Oral Q6HRS RADHA Aparicio.A.-C.   1,000 mg at 01/29/25 0540    Followed by    [START ON 2/7/2025] acetaminophen (Tylenol) tablet 1,000 mg  1,000 mg Oral Q6HRS PRN RADHA Aparicio.A.-C.        oxyCODONE immediate-release (Roxicodone) tablet 5 mg  5 mg Oral Q3HRS PRN RADHA Aparicio.A.-C.        Or    oxyCODONE immediate release (Roxicodone) tablet 10 mg  10 mg Oral Q3HRS PRN RADHA Aparicio.A.-C.        Or    fentaNYL (Sublimaze) injection 50 mcg  50 mcg Intravenous Q3HRS PRN RADHA Aparicio.A.-C.        traMADol (Ultram) 50 MG tablet 50 mg  50 mg Oral Q4HRS PRN RADHA Aparicio.A.-C.   50 mg at 01/29/25 0541    dexmedetomidine (Precedex) 400 mcg/100mL infusion  0-1.5 mcg/kg/hr (Ideal) Intravenous Continuous RADHA Aparicio.A.-C.   Infusion Ended Prior to Shift at 01/29/25 0700    sodium bicarbonate 8.4 % injection 50 mEq  50 mEq Intravenous Q HOUR PRN RADHA Aparicio.A.-C.        ondansetron (Zofran) syringe/vial injection 8 mg  8 mg Intravenous Q6HRS PRN RADHA Aparicio.A.-C.        Or    prochlorperazine (Compazine) injection 10 mg  10 mg Intravenous Q6HRS PRN RADHA Aparicio.A.-C.        acetaminophen (Tylenol) tablet 650 mg  650 mg Oral Q4HRS PRN Matthew Perry P.A.-C.        Or    acetaminophen (Tylenol) suppository 650 mg  650 mg Rectal Q4HRS PRN Matthew Perry P.A.-C.        senna-docusate (Pericolace Or Senokot S) 8.6-50 MG per tablet 2 Tablet  2 Tablet Oral BID Matthew Perry P.A.-C.   2 Tablet at 01/29/25 0540    And    polyethylene glycol/lytes (Miralax)  Packet 1 Packet  1 Packet Oral DAILY Matthew Perry P.A.-C.   1 Packet at 01/29/25 0541    And    [START ON 1/30/2025] magnesium hydroxide (Milk Of Magnesia) suspension 30 mL  30 mL Oral DAILY Matthew Perry P.A.-C.        And    bisacodyl (Dulcolax) suppository 10 mg  10 mg Rectal QDAY PRN Matthew Perry P.A.-C.        omeprazole (PriLOSEC) capsule 20 mg  20 mg Oral DAILY NHAN Aparicio-TROY   20 mg at 01/29/25 0540    mag hydrox-al hydrox-simeth (Maalox Plus Es Or Mylanta Ds) suspension 30 mL  30 mL Oral Q4HRS PRN Matthew Perry P.A.-C.        electrolyte-A (Plasmalyte-A) infusion   Intravenous PRN Matthew Perry P.A.-C.           Fluids    Intake/Output Summary (Last 24 hours) at 1/29/2025 1005  Last data filed at 1/29/2025 0800  Gross per 24 hour   Intake 1510.26 ml   Output 2300 ml   Net -789.74 ml       Laboratory  Recent Labs     01/28/25  1502 01/28/25  1614 01/28/25  2247   ISTATAPH 7.498* 7.378 7.347*   ISTATAPCO2 33.4 40.7 38.6   ISTATAPO2 71* 111* 92   ISTATATCO2 27* 25* 22*   IRSXAYL8YSL 96 98 97   ISTATARTHCO3 25.9 24.0 21.2   ISTATARTBE 3 -1 -4   ISTATTEMP 34.1 C 35.0 C 36.5 C   ISTATFIO2 50 50 30   ISTATSPEC Arterial Arterial Arterial   ISTATAPHTC 7.542* 7.407 7.354   WKEXSYXE2EP 59* 100 89         Recent Labs     01/27/25  1141 01/28/25  1339 01/28/25  1738 01/28/25  1925 01/28/25  2240 01/29/25  0435   SODIUM 135  --   --   --   --  136   POTASSIUM 4.4 4.3 4.1  --  4.7 4.5   CHLORIDE 103  --   --   --   --  106   CO2 21  --   --   --   --  19*   BUN 20  --   --   --   --  22   CREATININE 1.08  --   --   --   --  0.97   MAGNESIUM  --  2.5  --  2.4  --   --    CALCIUM 8.8  --   --   --   --  8.2*     Recent Labs     01/27/25  1141 01/29/25 0435   ALTSGPT 11  --    ASTSGOT 8*  --    ALKPHOSPHAT 85  --    TBILIRUBIN 0.3  --    GLUCOSE 101* 114*     Recent Labs     01/27/25  1141 01/29/25 0435   WBC 5.9 17.9*   NEUTSPOLYS 55.10  --    LYMPHOCYTES 34.00  --    MONOCYTES 8.10  --    EOSINOPHILS 1.70  --     BASOPHILS 0.80  --    ASTSGOT 8*  --    ALTSGPT 11  --    ALKPHOSPHAT 85  --    TBILIRUBIN 0.3  --      Recent Labs     01/27/25  1141 01/28/25  1339 01/29/25  0435   RBC 5.33  --  4.78   HEMOGLOBIN 14.9 13.5* 13.4*   HEMATOCRIT 45.8 39.5* 39.3*   PLATELETCT 281 156* 161*   PROTHROMBTM 13.2 17.6*  --    APTT 28.9 36.2*  --    INR 1.00 1.44*  --        Imaging  X-Ray:  I have personally reviewed the images and compared with prior images.  EKG:  I have personally reviewed the images and compared with prior images.  Echo:   Reviewed    Assessment/Plan  Aneurysm of ascending aorta without rupture (HCC)- (present on admission)  Assessment & Plan  Patient is post cardiac surgery performed by: Dr Smith on 1/28 date.   Procedure: Bio-Bentall 29mm Konect Corrigan valved conduit and coronary reimplantation with ascending hemiarch repair with 32 mm tube graft  Will monitor hemodynamics and titrate pressor and inotropic support.   Will follow chest tube output and correct coagulopathy.   Will monitor for post cardiac surgery complication such as myocardial dysfunction, bleeding, tamponade, graft dysfunction, arrhythmia, respiratory failure, neurologic, renal and infectious complication.    Will also monitor and treat stress hyperglycemia.       Encounter for weaning from ventilator ([facility])  Assessment & Plan  Intubated date: 1/28 extubated mild delay due to sleepiness  Goal saturation > 92%  He is getting IS 3700 ml no pain and doing well  Continue team supportive    Amphetamine abuse (HCC)  Assessment & Plan  Recommend continue cessation    Coronary artery disease due to lipid rich plaque- (present on admission)  Assessment & Plan  Continue aspirin and statin    Aortic insufficiency- (present on admission)  Assessment & Plan  Mild s/p AVR    Prediabetes- (present on admission)  Assessment & Plan  Stress hyperglycemia due to cardiac surgery  Insulin gtt per protocol    Hyperlipidemia- (present on admission)  Assessment &  Plan  Continue statin         VTE:  Lovenox  Ulcer: PPI  Lines: Central Line  Ongoing indication addressed, Carrillo Catheter  Ongoing indication addressed, and CT and v wires    I have performed a physical exam and reviewed and updated ROS and Plan today (1/29/2025). In review of yesterday's note (1/28/2025), there are no changes except as documented above.     Discussed patient condition and risk of morbidity and/or mortality with RN, RT, Pharmacy, Charge nurse / hot rounds, Patient, and CVS      The patient remains critically ill on insulin gtt post cardiac surgery.  Critical care time = 48 minutes in directly providing and coordinating critical care and extensive data review.  No time overlap and excludes procedures.

## 2025-01-29 NOTE — PROGRESS NOTES
Pt. Awakes to voice, moves all extremities and follows commands. Pt. Cannot stay awake. Matthew Perry notified that pt. Is not likely going to extubate by 6 hour john.

## 2025-01-29 NOTE — PROGRESS NOTES
4 Eyes Skin Assessment Completed by JHONY Bucio and JHONY Oneil.    Head Scar  Ears WDL  Nose WDL  Mouth WDL  Neck WDL  Breast/Chest Incision  Shoulder Blades Redness and Non-Blanching from de-fib pads          Spine WDL  (R) Arm/Elbow/Hand Scar  (L) Arm/Elbow/Hand Scar  Abdomen WDL  Groin WDL  Scrotum/Coccyx/Buttocks WDL  (R) Leg Scar  (L) Leg Scar  (R) Heel/Foot/Toe WDL  (L) Heel/Foot/Toe WDL          Devices In Places ECG, Blood Pressure Cuff, Pulse Ox, Carrillo, Arterial Line, SCD's, ET Tube, Central Line, Pacer, and Nasal Cannula      Interventions In Place Gray Ear Foams, NC W/Ear Foams, Pillows, Q2 Turns, Low Air Loss Mattress, and Heels Loaded W/Pillows    Possible Skin Injury Yes    Pictures Uploaded Into Epic Yes  Wound Consult Placed Yes  RN Wound Prevention Protocol Ordered No

## 2025-01-29 NOTE — PROGRESS NOTES
Monitor Summary: Sinus bradycardia 50s-70s   Occasional to frequent PVCs and PACs.   Patient converted to junctional tachycardia at 1847 and converted back to sinus adan at 2332.    Pacer backup settings VVI 40/10/0.8. Wires are capped this morning. No pacing required overnight.

## 2025-01-29 NOTE — DIETARY
Nutrition Services: Cardiac Education Consult   Day 1 of admit.  Donovan Vail is a 64 y.o. male with admitting DX of Ascending aortic aneurysm, unspecified whether ruptured.     RD received consult for heart healthy diet education. RD included nutrition recommendations and tips in dietary discharge instructions that align with pt's current dx. Outpatient resources included to encourage follow-up with [facility] for continued support after discharge.     No other education needs identified at this time. Please consult RD as needed for supplemental education or at request of pt.

## 2025-01-29 NOTE — CARE PLAN
The patient is Unstable - High likelihood or risk of patient condition declining or worsening    Shift Goals  Clinical Goals: Hemodynamic stability, Mobilize, encourage IS  Patient Goals: Eat, rest  Family Goals: Updates    Progress made toward(s) clinical / shift goals:    Problem: Skin Integrity  Goal: Skin integrity is maintained or improved  Outcome: Progressing     Problem: Hemodynamics  Goal: Patient's hemodynamics, fluid balance and neurologic status will be stable or improve  Outcome: Progressing     Problem: Pain - Standard  Goal: Alleviation of pain or a reduction in pain to the patient’s comfort goal  Outcome: Progressing     Problem: Post Op Day 1 CABG/Heart Valve Replacement  Goal: Optimal care of the post op CABG/heart valve replacement Post Op Day 1  Intervention: EKG and CXR completed  Note: Completed at night  Intervention: Antibiotics are discontinued within 24 hours of anesthesia end time unless indication documented for continuation beyond 24 hours  Note: D/C'd  Intervention: Daily weights in the morning  Note: Pt. Weighed in AM  Intervention: Up in chair for all meals  Note: Pt. Up in chair for all meals  Intervention: Ambulate in am if stable. First ambulation 25 feet. Repeat x 3 as tolerated  Note: Pt. Has ambulated 3 times. Furthest distance 500 ft  Intervention: Discontinue cano catheter unless documented reason for continuation  Note: Cano per CTS APRN  Intervention: Assess surgical dressing and check provider orders for potential removal  Note: Surgical site cleansed with foam cleanser and washcloth  Intervention: Ensure referal to intensive cardiac rehab is ordered, and smoking cessation education if appropriate  Note: NA  Intervention: OHS trained RN to remove chest tubes if ordered by provider  Note: CT still in per aprn  Intervention: IS q 1 hour while awake and record best IS volume  Note: Completed best IS 3750  Intervention: Knee high JOHN hose, on during the day, off at  night  Note: JOHN hose on all day  Intervention: Saline lock IV  Note: Pt. Saline locked  Intervention: Transfer to tele status, begin VS q 4 hours  Note: Pt. Still ICU status  Intervention: After 24th hour post-anesthesia end time, transition patient to Cardiac Surgery SQ Insulin Protocol  Note: SQ protocol initiated  Intervention: If patient is CABG or on home beta-blocker, start/resume beta-blocker on POD 1 or POD 2 or document contraindication  Note: NA       Patient is not progressing towards the following goals:

## 2025-01-30 LAB
ANION GAP SERPL CALC-SCNC: 12 MMOL/L (ref 7–16)
BUN SERPL-MCNC: 19 MG/DL (ref 8–22)
CALCIUM SERPL-MCNC: 8.7 MG/DL (ref 8.5–10.5)
CHLORIDE SERPL-SCNC: 102 MMOL/L (ref 96–112)
CO2 SERPL-SCNC: 22 MMOL/L (ref 20–33)
CREAT SERPL-MCNC: 0.69 MG/DL (ref 0.5–1.4)
ERYTHROCYTE [DISTWIDTH] IN BLOOD BY AUTOMATED COUNT: 46.1 FL (ref 35.9–50)
GFR SERPLBLD CREATININE-BSD FMLA CKD-EPI: 104 ML/MIN/1.73 M 2
GLUCOSE BLD STRIP.AUTO-MCNC: 101 MG/DL (ref 65–99)
GLUCOSE BLD STRIP.AUTO-MCNC: 119 MG/DL (ref 65–99)
GLUCOSE BLD STRIP.AUTO-MCNC: 126 MG/DL (ref 65–99)
GLUCOSE BLD STRIP.AUTO-MCNC: 142 MG/DL (ref 65–99)
GLUCOSE SERPL-MCNC: 137 MG/DL (ref 65–99)
HCT VFR BLD AUTO: 37.5 % (ref 42–52)
HGB BLD-MCNC: 12.9 G/DL (ref 14–18)
MCH RBC QN AUTO: 28.4 PG (ref 27–33)
MCHC RBC AUTO-ENTMCNC: 34.4 G/DL (ref 32.3–36.5)
MCV RBC AUTO: 82.4 FL (ref 81.4–97.8)
PLATELET # BLD AUTO: 159 K/UL (ref 164–446)
PMV BLD AUTO: 9.6 FL (ref 9–12.9)
POTASSIUM SERPL-SCNC: 4.5 MMOL/L (ref 3.6–5.5)
RBC # BLD AUTO: 4.55 M/UL (ref 4.7–6.1)
SODIUM SERPL-SCNC: 136 MMOL/L (ref 135–145)
WBC # BLD AUTO: 21.6 K/UL (ref 4.8–10.8)

## 2025-01-30 PROCEDURE — 97535 SELF CARE MNGMENT TRAINING: CPT

## 2025-01-30 PROCEDURE — 770020 HCHG ROOM/CARE - TELE (206)

## 2025-01-30 PROCEDURE — 97163 PT EVAL HIGH COMPLEX 45 MIN: CPT

## 2025-01-30 PROCEDURE — 82962 GLUCOSE BLOOD TEST: CPT

## 2025-01-30 PROCEDURE — 700111 HCHG RX REV CODE 636 W/ 250 OVERRIDE (IP)

## 2025-01-30 PROCEDURE — 85027 COMPLETE CBC AUTOMATED: CPT

## 2025-01-30 PROCEDURE — 700102 HCHG RX REV CODE 250 W/ 637 OVERRIDE(OP): Performed by: NURSE PRACTITIONER

## 2025-01-30 PROCEDURE — 99024 POSTOP FOLLOW-UP VISIT: CPT | Performed by: THORACIC SURGERY (CARDIOTHORACIC VASCULAR SURGERY)

## 2025-01-30 PROCEDURE — A9270 NON-COVERED ITEM OR SERVICE: HCPCS | Performed by: NURSE PRACTITIONER

## 2025-01-30 PROCEDURE — 97165 OT EVAL LOW COMPLEX 30 MIN: CPT

## 2025-01-30 PROCEDURE — A9270 NON-COVERED ITEM OR SERVICE: HCPCS

## 2025-01-30 PROCEDURE — 700111 HCHG RX REV CODE 636 W/ 250 OVERRIDE (IP): Mod: JZ | Performed by: NURSE PRACTITIONER

## 2025-01-30 PROCEDURE — 700102 HCHG RX REV CODE 250 W/ 637 OVERRIDE(OP)

## 2025-01-30 PROCEDURE — 80048 BASIC METABOLIC PNL TOTAL CA: CPT

## 2025-01-30 PROCEDURE — 94669 MECHANICAL CHEST WALL OSCILL: CPT

## 2025-01-30 RX ORDER — AMLODIPINE BESYLATE 10 MG/1
10 TABLET ORAL
Status: DISCONTINUED | OUTPATIENT
Start: 2025-01-31 | End: 2025-02-02 | Stop reason: HOSPADM

## 2025-01-30 RX ADMIN — METOPROLOL TARTRATE 25 MG: 25 TABLET, FILM COATED ORAL at 05:49

## 2025-01-30 RX ADMIN — MAGNESIUM SULFATE IN DEXTROSE 1 G: 10 INJECTION, SOLUTION INTRAVENOUS at 06:28

## 2025-01-30 RX ADMIN — AMLODIPINE BESYLATE 5 MG: 10 TABLET ORAL at 05:48

## 2025-01-30 RX ADMIN — Medication 1 APPLICATOR: at 20:36

## 2025-01-30 RX ADMIN — SENNOSIDES AND DOCUSATE SODIUM 2 TABLET: 50; 8.6 TABLET ORAL at 18:17

## 2025-01-30 RX ADMIN — ACETAMINOPHEN 1000 MG: 500 TABLET ORAL at 18:19

## 2025-01-30 RX ADMIN — MAGNESIUM HYDROXIDE 30 ML: 2400 SUSPENSION ORAL at 05:49

## 2025-01-30 RX ADMIN — HYDRALAZINE HYDROCHLORIDE 10 MG: 20 INJECTION, SOLUTION INTRAMUSCULAR; INTRAVENOUS at 04:13

## 2025-01-30 RX ADMIN — ENOXAPARIN SODIUM 40 MG: 100 INJECTION SUBCUTANEOUS at 18:19

## 2025-01-30 RX ADMIN — Medication 1 APPLICATOR: at 09:07

## 2025-01-30 RX ADMIN — ACETAMINOPHEN 1000 MG: 500 TABLET ORAL at 11:38

## 2025-01-30 RX ADMIN — ROSUVASTATIN CALCIUM 5 MG: 10 TABLET, FILM COATED ORAL at 18:18

## 2025-01-30 RX ADMIN — ASPIRIN 81 MG: 81 TABLET, COATED ORAL at 05:49

## 2025-01-30 RX ADMIN — POLYETHYLENE GLYCOL 3350 1 PACKET: 17 POWDER, FOR SOLUTION ORAL at 05:49

## 2025-01-30 RX ADMIN — SENNOSIDES AND DOCUSATE SODIUM 2 TABLET: 50; 8.6 TABLET ORAL at 05:48

## 2025-01-30 RX ADMIN — ACETAMINOPHEN 1000 MG: 500 TABLET ORAL at 05:49

## 2025-01-30 RX ADMIN — LABETALOL HYDROCHLORIDE 20 MG: 5 INJECTION INTRAVENOUS at 01:16

## 2025-01-30 RX ADMIN — OMEPRAZOLE 20 MG: 20 CAPSULE, DELAYED RELEASE ORAL at 05:48

## 2025-01-30 RX ADMIN — METOPROLOL TARTRATE 25 MG: 25 TABLET, FILM COATED ORAL at 18:18

## 2025-01-30 ASSESSMENT — COGNITIVE AND FUNCTIONAL STATUS - GENERAL
HELP NEEDED FOR BATHING: A LITTLE
MOBILITY SCORE: 24
SUGGESTED CMS G CODE MODIFIER DAILY ACTIVITY: CK
DAILY ACTIVITIY SCORE: 19
TOILETING: A LITTLE
DRESSING REGULAR UPPER BODY CLOTHING: A LITTLE
SUGGESTED CMS G CODE MODIFIER MOBILITY: CH
PERSONAL GROOMING: A LITTLE
DRESSING REGULAR LOWER BODY CLOTHING: A LITTLE

## 2025-01-30 ASSESSMENT — ACTIVITIES OF DAILY LIVING (ADL): TOILETING: INDEPENDENT

## 2025-01-30 ASSESSMENT — GAIT ASSESSMENTS
DISTANCE (FEET): 250
GAIT LEVEL OF ASSIST: SUPERVISED

## 2025-01-30 ASSESSMENT — PAIN DESCRIPTION - PAIN TYPE
TYPE: ACUTE PAIN

## 2025-01-30 ASSESSMENT — FIBROSIS 4 INDEX: FIB4 SCORE: 0.94

## 2025-01-30 NOTE — CARE PLAN
Problem: Hyperinflation  Goal: Prevent or improve atelectasis  Description: Target End Date:  3 to 4 days    1. Instruct incentive spirometry usage  2.  Perform hyperinflation therapy as indicated  Outcome: Progressing   PEP BID   Walk in

## 2025-01-30 NOTE — CARE PLAN
The patient is Stable - Low risk of patient condition declining or worsening    Shift Goals  Clinical Goals: moblize, remove mediastinal tubes, hemodynamic stablity  Patient Goals: rest  Family Goals: updates, speak with CTS team    Progress made toward(s) clinical / shift goals:      Problem: Knowledge Deficit - Standard  Goal: Patient and family/care givers will demonstrate understanding of plan of care, disease process/condition, diagnostic tests and medications  Outcome: Progressing     Problem: Post op day 2 CABG/Heart Valve Replacement  Goal: Optimal care of the post op CABG/heart valve replacement post op day 2  Outcome: Progressing  Note: Patient ambulate for fourth walk at beginning of shift, increasing distance from previous ambulation. Patient educated on incision care and able to demonstrate proper cleaning of incision sites. Incentive spirometer at bedside and patient educated on proper use, able to achieve 4000ml. Daily weights completed each morning at time of AM medication pass. Carrillo and mediastinal chest tubes previously removed.

## 2025-01-30 NOTE — CARE PLAN
Problem: Hyperinflation  Goal: Prevent or improve atelectasis  Description: Target End Date:  3 to 4 days    1. Instruct incentive spirometry usage  2.  Perform hyperinflation therapy as indicated  Outcome: Progressing   PEP 4000

## 2025-01-30 NOTE — PROGRESS NOTES
Received report from Aby BOOKER. Pt arrived to unit at 1050 via wheelchair escorted by ICU RN. Assessment complete. VSS. No signs of distress noted at this time. Tele monitor in place. Monitor room notified. Pt c/o pain 0/10. Fall precautions and appropriate signs in place. Pt oriented to unit routine, call light/phone system within reach. Personal belongings within reach. Pt educated regarding fall precautions. Bed alarm is on. Pt denies any further needs at this time.

## 2025-01-30 NOTE — PROGRESS NOTES
Cardiovascular Surgery Progress Note    Name: Donovan Vail  MRN: [id number]  : 1960  Admit Date: 2025  5:10 AM  2 Days Post-Op     Procedure:  Procedure(s) and Anesthesia Type:  Dr Smith; 25     * AORTIC ROOT REPLACEMENT (Bio-Bentall 29 mm Konect Corrigan valved conduit, coronary artery reimplantation), ascending aortic aneurysm hemiarch repair (32 mm tube graft), circulatory arrest (25 minutes) and intraoperative transesophageal echocardiography     Vitals:  Vitals:    25 0300 25 0400 25 0500 25 0600   BP: 130/77 137/71 134/61 116/85   Pulse: 85 84 90 92   Resp:       Temp:  36.9 °C (98.4 °F)  36.9 °C (98.4 °F)   TempSrc:  Bladder  Bladder   SpO2: 95% 94% 95% 95%   Weight:    86.6 kg (190 lb 14.7 oz)   Height:          Temp (24hrs), Av.8 °C (98.3 °F), Min:36.6 °C (97.9 °F), Max:37 °C (98.6 °F)      Respiratory:    Respiration: 20, Pulse Oximetry: 95 %       Fluids:    Intake/Output Summary (Last 24 hours) at 2025 0654  Last data filed at 2025 0600  Gross per 24 hour   Intake 885 ml   Output 4910 ml   Net -4025 ml     Admit weight: Weight: 88.5 kg (195 lb 1.7 oz)  Current weight: Weight: 86.6 kg (190 lb 14.7 oz) (25 0600)    Labs:  Recent Labs     25  1141 25  1339 25  0435 25  0220   WBC 5.9  --  17.9* 21.6*   RBC 5.33  --  4.78 4.55*   HEMOGLOBIN 14.9 13.5* 13.4* 12.9*   HEMATOCRIT 45.8 39.5* 39.3* 37.5*   MCV 85.9  --  82.2 82.4   MCH 28.0  --  28.0 28.4   MCHC 32.5  --  34.1 34.4   RDW 47.3  --  45.6 46.1   PLATELETCT 281 156* 161* 159*   MPV 9.1  --  9.4 9.6     Recent Labs     25  1141 25  1339 25  2240 25  0435 25  0220   SODIUM 135  --   --  136 136   POTASSIUM 4.4   < > 4.7 4.5 4.5   CHLORIDE 103  --   --  106 102   CO2 21  --   --  19* 22   GLUCOSE 101*  --   --  114* 137*   BUN 20  --   --  22 19   CREATININE 1.08  --   --  0.97 0.69   CALCIUM 8.8  --   --  8.2* 8.7    < > = values in  this interval not displayed.     Recent Labs     01/27/25  1141 01/28/25  1339   APTT 28.9 36.2*   INR 1.00 1.44*       HgbA1c:  6.0    Diabetic Educator Consult:  N/A    Medications:  Scheduled Medications   Medication Dose Frequency    insulin lispro  2-9 Units 4X/DAY ACHS    amLODIPine  5 mg Q DAY    rosuvastatin  5 mg Q EVENING    enoxaparin (LOVENOX) injection  40 mg DAILY AT 1800    Nozin nasal  swab  1 Applicator BID    magnesium sulfate  1 g DAILY    metoprolol tartrate  25 mg BID    aspirin  81 mg DAILY    Pharmacy Consult Request  1 Each PHARMACY TO DOSE    acetaminophen  1,000 mg Q6HRS    senna-docusate  2 Tablet BID    And    polyethylene glycol/lytes  1 Packet DAILY    And    magnesium hydroxide  30 mL DAILY    omeprazole  20 mg DAILY        Exam:   Physical Exam  Vitals and nursing note reviewed.   Constitutional:       General: He is not in acute distress.     Appearance: Normal appearance.   HENT:      Head: Normocephalic.      Right Ear: External ear normal.      Left Ear: External ear normal.      Nose: Nose normal. No congestion.      Mouth/Throat:      Mouth: Mucous membranes are moist.      Pharynx: Oropharynx is clear.   Eyes:      Extraocular Movements: Extraocular movements intact.      Pupils: Pupils are equal, round, and reactive to light.   Cardiovascular:      Rate and Rhythm: Normal rate and regular rhythm.      Pulses: Normal pulses.      Heart sounds: Normal heart sounds.   Pulmonary:      Effort: Pulmonary effort is normal.   Abdominal:      General: Bowel sounds are decreased. There is no distension.      Palpations: Abdomen is soft.   Musculoskeletal:      Cervical back: Normal range of motion and neck supple. No tenderness.      Right lower leg: No edema.      Left lower leg: No edema.   Skin:     General: Skin is warm and dry.      Comments: Sternotomy: open to air, CDI   Neurological:      General: No focal deficit present.      Mental Status: He is alert and oriented to  person, place, and time. Mental status is at baseline.   Psychiatric:         Mood and Affect: Mood normal.         Behavior: Behavior normal.         Thought Content: Thought content normal.         Cardiac Medications:    ASA - Yes    Plavix - No; contraindicated because of Other aortic root    Post-operative Beta Blockers - Yes    Ace/ARB- No; contraindicated because of Normal EF    ARNI-  No; contraindicated because of Normal EF    Statin - No; contraindicated because of No CAD    Aldactone- No; contraindicated because of Normal EF    SGLT2i-  No; contraindicated because of Normal EF    Ejection Fraction:  65%    Telemetry:   1/29 a fib overnight, now SB/SR  1/30 SR    Assessment/Plan:  POD 1  HTN, a fib last night- amio d/c'd due to conversion to SB, neuro intact, wounds intact, abdomen soft, fluid balance negative, wt stable,  2 L NC.  Plan:  Keep chest tubes and pacing wires. Add amlodipine. IS/ambulate.    POD 2 HTN requiring PRNs, SR, Room air. Neuro intact, wounds CDI. ABD soft, NT, BM-. Fluid neg, wt below admit, good uop, Cr: 0.69. Chest tubes with 110 out overnight, H/h: 12.9/37.5. Plan: Remove chest tubes and cano. Keep pacer wires. Increase amlodipine. Transfer to tele. Encourage IS/Ambulation.     Disposition:  TBD

## 2025-01-30 NOTE — THERAPY
"Physical Therapy   Initial Evaluation     Patient Name: Donovan Vail  Age:  64 y.o., Sex:  male  Medical Record #: [id number]  Today's Date: 1/30/2025     Precautions  Precautions: Fall Risk;Cardiac Precautions (See Comments);Sternal Precautions (See Comments)    Assessment  64 y.o. male admitted 1/28/2025 with  hx of tobacco abuse, chol, CAD, necrotizing fasciitis, skin grafts,  tobacco abuse, methamphetamine abuse and tested + on prior surgery so was canceled. That has had 5.4 cm aortic aneurysm. He today underwent planned root replacement. Shortly after circulatory arrest and starting cardiac bypass patient had spontaneous rupture of his aneurysm. He underwent the rest of his surgery uneventful with Bio-Bentall 29mm Konect Corrigan valved conduit and coronary reimplantation with ascending hemiarch repair with 32 mm tube graft.   A cardiac handout was provided and discussed regarding talk test/RPE scale to modify home walking program, modifiable cardiac risk factors and role/timeline of outpatient cardiac rehab. Reviewed sternal precautions and activity recommendations with patient and spouse. Patient reporting compliance and understanding. No further acute care PT needs at this time     Plan    Physical Therapy Initial Treatment Plan   Duration: Evaluation only    DC Equipment Recommendations: None  Discharge Recommendations: Anticipate that the patient will have no further physical therapy needs after discharge from the hospital       Subjective  \"I'm doing a lot better\"     Objective       01/30/25 0930   Precautions   Precautions Fall Risk;Cardiac Precautions (See Comments);Sternal Precautions (See Comments)   Pain 0 - 10 Group   Therapist Pain Assessment 0;Post Activity Pain Same as Prior to Activity   Prior Living Situation   Prior Services None   Housing / Facility 1 Story Apartment / Condo   Elevator Yes   Bathroom Set up Bathtub / Shower Combination;Shower Chair   Equipment Owned Tub / Shower Seat;Grab " Bar(s) In Tub / Shower   Lives with - Patient's Self Care Capacity Unrelated Adult   Comments lives with a roommate at baseline. He reports he will be staying with his daughter at [place], she lives in a SSH and can assist as needed   Prior Level of Functional Mobility   Bed Mobility Independent   Transfer Status Independent   Ambulation Independent   Assistive Devices Used None   Comments indep and active at baseline. He's a foley and indep at baseline   History of Falls   History of Falls No   Cognition    Cognition / Consciousness WDL   Level of Consciousness Alert   Comments pleasant and cooeprative. Recpetive to education, but questionable how much he retains   Passive ROM Upper Body   Passive ROM Upper Body WDL   Active ROM Upper Body   Active ROM Upper Body  WDL   Strength Upper Body   Upper Body Strength  WDL   Sensation Upper Body   Upper Extremity Sensation  WDL   Upper Body Muscle Tone   Upper Body Muscle Tone  WDL   Strength Lower Body   Lower Body Strength  WDL   Sensation Lower Body   Lower Extremity Sensation   WDL   Lower Body Muscle Tone   Lower Body Muscle Tone  WDL   Coordination Upper Body   Coordination WDL   Coordination Lower Body    Coordination Lower Body  WDL   Other Treatments   Other Treatments Provided extensive education about OHS, activity modifications and return to activity   Balance Assessment   Sitting Balance (Static) Fair   Sitting Balance (Dynamic) Fair   Standing Balance (Static) Fair   Standing Balance (Dynamic) Fair   Weight Shift Sitting Fair   Weight Shift Standing Fair   Comments no AD   Bed Mobility    Supine to Sit Supervised   Sit to Supine Supervised   Scooting Supervised   Comments cueing for log roll and use of cardiac pillow, repreated practice with improved technique   Gait Analysis   Gait Level Of Assist Supervised   Assistive Device None   Distance (Feet) 250   # of Times Distance was Traveled 1   Functional Mobility   Sit to Stand Supervised   Bed, Chair,  Wheelchair Transfer Supervised   Mobility chair>gait>bed>chair   ICU Target Mobility Level   ICU Mobility - Targeted Level Level 4   6 Clicks Assessment - How much HELP from from another person do you currently need... (If the patient hasn't done an activity recently, how much help from another person do you think he/she would need if he/she tried?)   Turning from your back to your side while in a flat bed without using bedrails? 4   Moving from lying on your back to sitting on the side of a flat bed without using bedrails? 4   Moving to and from a bed to a chair (including a wheelchair)? 4   Standing up from a chair using your arms (e.g., wheelchair, or bedside chair)? 4   Walking in hospital room? 4   Climbing 3-5 steps with a railing? 4   6 clicks Mobility Score 24   Activity Tolerance   Sitting in Chair post session   Sitting Edge of Bed 5 min   Standing 10 min   Education Group   Education Provided Role of Physical Therapist;Cardiac Precautions;Sternal Precautions   Cardiac Precautions Patient Response Patient;Acceptance;Explanation;Demonstration;Handout;Verbal Demonstration;Action Demonstration   Sternal Precautions Patient Response Patient;Acceptance;Explanation;Demonstration;Handout;Verbal Demonstration;Action Demonstration   Role of Physical Therapist Patient Response Patient;Acceptance;Explanation;Handout;Demonstration;Verbal Demonstration;Action Demonstration   Additional Comments Fall prevention with positional changes   Physical Therapy Initial Treatment Plan    Duration Evaluation only   Anticipated Discharge Equipment and Recommendations   DC Equipment Recommendations None   Discharge Recommendations Anticipate that the patient will have no further physical therapy needs after discharge from the hospital     Brenda Malik, PT, DPT, GCS

## 2025-01-30 NOTE — PROGRESS NOTES
Carrillo removed at 0945. Urinal provided to patient, patient encouraged to drink fluids. If patient has not urinated in 6 hours, or bladder distended, having urgency then bladder scan per protocol.

## 2025-01-30 NOTE — THERAPY
Occupational Therapy   Initial Evaluation     Patient Name: Donovan Vail  Age:  64 y.o., Sex:  male  Medical Record #: [id number]  Today's Date: 1/30/2025     Precautions  Precautions: Fall Risk, Cardiac Precautions (See Comments), Sternal Precautions (See Comments)    Assessment    Patient is 64 y.o. male s/p aortic root replacement. Other pertinent medical hsitory includes tobacco abuse, CAD, necrotizing fasciitis, skin grafts, tobacco abuse, methamphetamine abuse, HLD, prediabetes, and cataracts. Pt seen for OT evaluation and treatment. Pt required supv-SBA to don/doff socks, stand to wash hands, functional mobility, and toilet transfer. Pt plans to d/c to stay with his daughter who is an RN. Pt reported that she can assist as needed. Patient will not be actively followed for occupational therapy services at this time, however may be seen if requested by physician for 1 more visit within 30 days to address any discharge or equipment needs.      Plan    Occupational Therapy Initial Treatment Plan   Duration: Discharge Needs Only    DC Equipment Recommendations: None  Discharge Recommendations: Anticipate that the patient will have no further occupational therapy needs after discharge from the hospital      Objective     01/30/25 1252   Prior Living Situation   Prior Services None   Housing / Facility 1 Story Apartment / Condo  (On second floor)   Steps Into Home 0   Elevator Yes   Bathroom Set up Bathtub / Shower Combination;Shower Chair;Grab Bars   Equipment Owned Tub / Shower Seat;Grab Bar(s) In Tub / Shower   Lives with - Patient's Self Care Capacity Unrelated Adult   Comments Pt lives with a roommate who he reported can assist as needed. Pt plans to stay with his daughter who is an RN after d/c.   Prior Level of ADL Function   Self Feeding Independent   Grooming / Hygiene Independent   Bathing Independent   Dressing Independent   Toileting Independent   Prior Level of IADL Function   Medication Management  Independent   Laundry Independent   Kitchen Mobility Independent   Finances Independent   Home Management Independent   Shopping Independent   Prior Level Of Mobility Independent Without Device in Community;Independent Without Device in Home   Driving / Transportation Driving Independent   Occupation (Pre-Hospital Vocational)   ([employer])   History of Falls   History of Falls No   Precautions   Precautions Fall Risk;Cardiac Precautions (See Comments);Sternal Precautions (See Comments)   Vitals   Vitals Comments remained stable   Pain   Pain Scales 0 to 10 Scale    Pain 0 - 10 Group   Therapist Pain Assessment Post Activity Pain Same as Prior to Activity;Nurse Notified  (Not rated, agreeable to session)   Cognition    Cognition / Consciousness WDL   Level of Consciousness Alert   Comments very pleasant, cooperative, receptive to education   Passive ROM Upper Body   Passive ROM Upper Body WDL   Active ROM Upper Body   Active ROM Upper Body  WDL   Strength Upper Body   Upper Body Strength  WDL   Sensation Upper Body   Upper Extremity Sensation  WDL   Upper Body Muscle Tone   Upper Body Muscle Tone  WDL   Coordination Upper Body   Coordination WDL   Balance Assessment   Sitting Balance (Static) Fair   Sitting Balance (Dynamic) Fair   Standing Balance (Static) Fair   Standing Balance (Dynamic) Fair   Weight Shift Sitting Fair   Weight Shift Standing Fair   Comments w/ no AD   Bed Mobility    Comments up to chair pre/post, discussed log roll   ADL Assessment   Eating Modified Independent   Grooming Supervision;Standing  (washed hands at sink)   Upper Body Dressing Supervision  (don/doff gown)   Lower Body Dressing Supervision  (don/doff socks)   Toileting   (toilet transfer only)   Functional Mobility   Sit to Stand Supervised   Bed, Chair, Wheelchair Transfer Supervised   Toilet Transfers Standby Assist   Transfer Method Stand Step   Mobility chair>stand>chair>bathroom>chair   Comments w/ no AD   ICU Target Mobility  Level   ICU Mobility - Targeted Level Level 4   Visual Perception   Visual Perception  Not Tested   Edema / Skin Assessment   Comments noted blood on gown/bedside table, pt reported he had a nosebleed, RN notified   Activity Tolerance   Sitting in Chair up to chair pre/post   Sitting Edge of Bed NT   Standing >5 min   Comments functional for eval   Education Group   Education Provided Role of Occupational Therapist;Activities of Daily Living;Cardiac Precautions;Sternal Precautions;Pathology of bedrest;Energy Conservation   Role of Occupational Therapist Patient Response Patient;Family;Acceptance;Explanation;Verbal Demonstration   Cardiac Precautions Patient Response Patient;Family;Acceptance;Explanation;Demonstration;Verbal Demonstration;Action Demonstration;Handout   Sternal Precautions Patient Response Patient;Family;Acceptance;Explanation;Handout;Demonstration;Verbal Demonstration;Action Demonstration   Energy Conservation Patient Response Family;Patient;Acceptance;Explanation;Verbal Demonstration   ADL Patient Response Patient;Family;Acceptance;Explanation;Demonstration;Verbal Demonstration;Action Demonstration   Pathology of Bedrest Patient Response Patient;Family;Acceptance;Explanation;Verbal Demonstration

## 2025-01-30 NOTE — PROGRESS NOTES
Patient father Jake updated on patient transfer to new room T721    Pt transported to new room with all belongings.

## 2025-01-30 NOTE — PROGRESS NOTES
Patient doing well post cardiac surgery on room air with stable vitals, labs all benign other than elevated WBC likely stress response no fever or signs of infection. Call with questions or concerns.     Jj Huerta MD  Critical Care Medicine

## 2025-01-30 NOTE — PROGRESS NOTES
4 Eyes Skin Assessment Completed by JHONY Soto and JHONY Cohen.    Head WDL  Ears WDL  Nose WDL  Mouth WDL  Neck WDL  Breast/Chest Midline incision, chest tube sites  Shoulder Blades WDL  Spine WDL  (R) Arm/Elbow/Hand WDL  (L) Arm/Elbow/Hand WDL  Abdomen WDL  Groin WDL  Scrotum/Coccyx/Buttocks WDL  (R) Leg WDL  (L) Leg WDL  (R) Heel/Foot/Toe WDL  (L) Heel/Foot/Toe WDL          Devices In Places Tele Box, Blood Pressure Cuff, and Pulse Ox      Interventions In Place Pillows    Possible Skin Injury No    Pictures Uploaded Into Epic N/A  Wound Consult Placed N/A  RN Wound Prevention Protocol Ordered No

## 2025-01-30 NOTE — CARE PLAN
The patient is Watcher - Medium risk of patient condition declining or worsening    Shift Goals  Clinical Goals: Hemodynamic stability, mobility, IS  Patient Goals: Rest  Family Goals: NIKA    Progress made toward(s) clinical / shift goals:    Problem: Knowledge Deficit - Standard  Goal: Patient and family/care givers will demonstrate understanding of plan of care, disease process/condition, diagnostic tests and medications  Outcome: Progressing     Problem: Skin Integrity  Goal: Skin integrity is maintained or improved  Outcome: Progressing     Problem: Fall Risk  Goal: Patient will remain free from falls  Outcome: Progressing     Problem: Post Op Day 1 CABG/Heart Valve Replacement  Goal: Optimal care of the post op CABG/heart valve replacement Post Op Day 1  Intervention: EKG and CXR completed  Note: Completed per protocol.   Intervention: All valve patients: PT/INR daily  Note: N/A  Intervention: Daily weights in the morning  Note: Patient up to standup scale in the morning  Intervention: Up in chair for all meals  Note: Patient up to chair for dinner and breakfast.   Intervention: Ambulate in am if stable. First ambulation 25 feet. Repeat x 3 as tolerated  Note: Patient ambulated entirety of unit x2.  Intervention: Discontinue cano catheter unless documented reason for continuation  Note: Cano to remain in place per APRN  Intervention: Assess surgical dressing and check provider orders for potential removal  Note: Surgical incision open to air.   Intervention: [facility] trained RN to remove chest tubes if ordered by provider  Note: Chest tubes to remain in place per APRN  Intervention: IS q 1 hour while awake and record best IS volume  Note: Best IS 4000  Intervention: Knee high JOHN hose, on during the day, off at night  Note: Completed per protocol.   Intervention: Saline lock IV  Note: Central line remains saline locked.   Intervention: Transfer to [facility] status, begin VS q 4 hours  Note: Patient remains ICU status  at this time.   Intervention: After 24th hour post-anesthesia end time, transition patient to Cardiac Surgery SQ Insulin Protocol  Note: Patient on SubQ protocol  Intervention: If patient is CABG or on home beta-blocker, start/resume beta-blocker on POD 1 or POD 2 or document contraindication  Note: Patient on metoprolol     Problem: Self Care  Goal: Patient will have the ability to perform ADLs independently or with assistance (bathe, groom, dress, toilet and feed)  Outcome: Progressing     Problem: Pain - Standard  Goal: Alleviation of pain or a reduction in pain to the patient’s comfort goal  Outcome: Progressing       Patient is not progressing towards the following goals:

## 2025-01-31 VITALS
DIASTOLIC BLOOD PRESSURE: 53 MMHG | HEART RATE: 74 BPM | OXYGEN SATURATION: 92 % | TEMPERATURE: 98.8 F | WEIGHT: 185.41 LBS | SYSTOLIC BLOOD PRESSURE: 91 MMHG | RESPIRATION RATE: 16 BRPM | BODY MASS INDEX: 23.79 KG/M2 | HEIGHT: 74 IN

## 2025-01-31 LAB
ANION GAP SERPL CALC-SCNC: 7 MMOL/L (ref 7–16)
BUN SERPL-MCNC: 21 MG/DL (ref 8–22)
CALCIUM SERPL-MCNC: 8.4 MG/DL (ref 8.5–10.5)
CHLORIDE SERPL-SCNC: 102 MMOL/L (ref 96–112)
CO2 SERPL-SCNC: 24 MMOL/L (ref 20–33)
CREAT SERPL-MCNC: 0.85 MG/DL (ref 0.5–1.4)
EKG IMPRESSION: NORMAL
ERYTHROCYTE [DISTWIDTH] IN BLOOD BY AUTOMATED COUNT: 47.1 FL (ref 35.9–50)
GFR SERPLBLD CREATININE-BSD FMLA CKD-EPI: 98 ML/MIN/1.73 M 2
GLUCOSE BLD STRIP.AUTO-MCNC: 107 MG/DL (ref 65–99)
GLUCOSE SERPL-MCNC: 98 MG/DL (ref 65–99)
HCT VFR BLD AUTO: 34.4 % (ref 42–52)
HGB BLD-MCNC: 11.5 G/DL (ref 14–18)
MAGNESIUM SERPL-MCNC: 1.9 MG/DL (ref 1.5–2.5)
MCH RBC QN AUTO: 27.6 PG (ref 27–33)
MCHC RBC AUTO-ENTMCNC: 33.4 G/DL (ref 32.3–36.5)
MCV RBC AUTO: 82.7 FL (ref 81.4–97.8)
PLATELET # BLD AUTO: 166 K/UL (ref 164–446)
PMV BLD AUTO: 9.9 FL (ref 9–12.9)
POTASSIUM SERPL-SCNC: 4.3 MMOL/L (ref 3.6–5.5)
RBC # BLD AUTO: 4.16 M/UL (ref 4.7–6.1)
SODIUM SERPL-SCNC: 133 MMOL/L (ref 135–145)
WBC # BLD AUTO: 17.2 K/UL (ref 4.8–10.8)

## 2025-01-31 PROCEDURE — 700111 HCHG RX REV CODE 636 W/ 250 OVERRIDE (IP): Mod: JZ

## 2025-01-31 PROCEDURE — 700102 HCHG RX REV CODE 250 W/ 637 OVERRIDE(OP): Performed by: NURSE PRACTITIONER

## 2025-01-31 PROCEDURE — 700101 HCHG RX REV CODE 250

## 2025-01-31 PROCEDURE — 83735 ASSAY OF MAGNESIUM: CPT

## 2025-01-31 PROCEDURE — 770020 HCHG ROOM/CARE - TELE (206)

## 2025-01-31 PROCEDURE — 700102 HCHG RX REV CODE 250 W/ 637 OVERRIDE(OP)

## 2025-01-31 PROCEDURE — 93010 ELECTROCARDIOGRAM REPORT: CPT | Mod: 76 | Performed by: INTERNAL MEDICINE

## 2025-01-31 PROCEDURE — 82962 GLUCOSE BLOOD TEST: CPT

## 2025-01-31 PROCEDURE — 99291 CRITICAL CARE FIRST HOUR: CPT | Performed by: INTERNAL MEDICINE

## 2025-01-31 PROCEDURE — 93005 ELECTROCARDIOGRAM TRACING: CPT | Mod: TC | Performed by: THORACIC SURGERY (CARDIOTHORACIC VASCULAR SURGERY)

## 2025-01-31 PROCEDURE — 99024 POSTOP FOLLOW-UP VISIT: CPT | Performed by: NURSE PRACTITIONER

## 2025-01-31 PROCEDURE — 93005 ELECTROCARDIOGRAM TRACING: CPT | Mod: TC | Performed by: NURSE PRACTITIONER

## 2025-01-31 PROCEDURE — 80048 BASIC METABOLIC PNL TOTAL CA: CPT

## 2025-01-31 PROCEDURE — 93010 ELECTROCARDIOGRAM REPORT: CPT | Performed by: INTERNAL MEDICINE

## 2025-01-31 PROCEDURE — 700111 HCHG RX REV CODE 636 W/ 250 OVERRIDE (IP): Mod: JZ | Performed by: NURSE PRACTITIONER

## 2025-01-31 PROCEDURE — 700101 HCHG RX REV CODE 250: Performed by: NURSE PRACTITIONER

## 2025-01-31 PROCEDURE — A9270 NON-COVERED ITEM OR SERVICE: HCPCS | Performed by: NURSE PRACTITIONER

## 2025-01-31 PROCEDURE — A9270 NON-COVERED ITEM OR SERVICE: HCPCS

## 2025-01-31 PROCEDURE — 85027 COMPLETE CBC AUTOMATED: CPT

## 2025-01-31 RX ORDER — METOPROLOL TARTRATE 1 MG/ML
5 INJECTION, SOLUTION INTRAVENOUS
Status: COMPLETED | OUTPATIENT
Start: 2025-01-31 | End: 2025-01-31

## 2025-01-31 RX ORDER — METOPROLOL TARTRATE 1 MG/ML
5 INJECTION, SOLUTION INTRAVENOUS
Status: COMPLETED | OUTPATIENT
Start: 2025-01-31 | End: 2025-02-01

## 2025-01-31 RX ORDER — METOPROLOL SUCCINATE 50 MG/1
50 TABLET, EXTENDED RELEASE ORAL 2 TIMES DAILY
Status: DISCONTINUED | OUTPATIENT
Start: 2025-01-31 | End: 2025-02-02 | Stop reason: HOSPADM

## 2025-01-31 RX ORDER — METOPROLOL TARTRATE 1 MG/ML
INJECTION, SOLUTION INTRAVENOUS
Status: COMPLETED
Start: 2025-01-31 | End: 2025-01-31

## 2025-01-31 RX ORDER — ADENOSINE 3 MG/ML
INJECTION, SOLUTION INTRAVENOUS
Status: COMPLETED
Start: 2025-01-31 | End: 2025-01-31

## 2025-01-31 RX ORDER — ADENOSINE 3 MG/ML
6 INJECTION, SOLUTION INTRAVENOUS ONCE
Status: COMPLETED | OUTPATIENT
Start: 2025-01-31 | End: 2025-01-31

## 2025-01-31 RX ADMIN — METOPROLOL TARTRATE 5 MG: 5 INJECTION INTRAVENOUS at 12:10

## 2025-01-31 RX ADMIN — OMEPRAZOLE 20 MG: 20 CAPSULE, DELAYED RELEASE ORAL at 05:36

## 2025-01-31 RX ADMIN — SENNOSIDES AND DOCUSATE SODIUM 2 TABLET: 50; 8.6 TABLET ORAL at 17:50

## 2025-01-31 RX ADMIN — METOPROLOL TARTRATE 5 MG: 5 INJECTION INTRAVENOUS at 03:55

## 2025-01-31 RX ADMIN — Medication 1 APPLICATOR: at 10:00

## 2025-01-31 RX ADMIN — AMLODIPINE BESYLATE 10 MG: 10 TABLET ORAL at 05:36

## 2025-01-31 RX ADMIN — ACETAMINOPHEN 1000 MG: 500 TABLET ORAL at 17:49

## 2025-01-31 RX ADMIN — METOPROLOL TARTRATE 5 MG: 5 INJECTION INTRAVENOUS at 12:21

## 2025-01-31 RX ADMIN — ROSUVASTATIN CALCIUM 5 MG: 10 TABLET, FILM COATED ORAL at 17:49

## 2025-01-31 RX ADMIN — AMIODARONE HYDROCHLORIDE 150 MG: 1.5 INJECTION, SOLUTION INTRAVENOUS at 04:03

## 2025-01-31 RX ADMIN — ADENOSINE 6 MG: 3 INJECTION, SOLUTION INTRAVENOUS at 03:58

## 2025-01-31 RX ADMIN — SENNOSIDES AND DOCUSATE SODIUM 2 TABLET: 50; 8.6 TABLET ORAL at 05:36

## 2025-01-31 RX ADMIN — AMIODARONE HYDROCHLORIDE 1 MG/MIN: 1.8 INJECTION, SOLUTION INTRAVENOUS at 04:15

## 2025-01-31 RX ADMIN — ACETAMINOPHEN 1000 MG: 500 TABLET ORAL at 05:36

## 2025-01-31 RX ADMIN — ACETAMINOPHEN 1000 MG: 500 TABLET ORAL at 00:15

## 2025-01-31 RX ADMIN — ASPIRIN 81 MG: 81 TABLET, COATED ORAL at 05:36

## 2025-01-31 RX ADMIN — METOPROLOL TARTRATE 5 MG: 1 INJECTION, SOLUTION INTRAVENOUS at 03:55

## 2025-01-31 RX ADMIN — AMIODARONE HYDROCHLORIDE 0.5 MG/MIN: 1.8 INJECTION, SOLUTION INTRAVENOUS at 09:59

## 2025-01-31 RX ADMIN — METOPROLOL TARTRATE 25 MG: 25 TABLET, FILM COATED ORAL at 05:36

## 2025-01-31 RX ADMIN — MAGNESIUM HYDROXIDE 30 ML: 2400 SUSPENSION ORAL at 05:35

## 2025-01-31 RX ADMIN — ENOXAPARIN SODIUM 40 MG: 100 INJECTION SUBCUTANEOUS at 17:49

## 2025-01-31 RX ADMIN — ADENOSINE 6 MG: 3 INJECTION INTRAVENOUS at 03:58

## 2025-01-31 RX ADMIN — POLYETHYLENE GLYCOL 3350 1 PACKET: 17 POWDER, FOR SOLUTION ORAL at 05:36

## 2025-01-31 RX ADMIN — METOPROLOL SUCCINATE 50 MG: 50 TABLET, EXTENDED RELEASE ORAL at 17:49

## 2025-01-31 RX ADMIN — Medication 1 APPLICATOR: at 19:56

## 2025-01-31 RX ADMIN — ACETAMINOPHEN 1000 MG: 500 TABLET ORAL at 12:35

## 2025-01-31 ASSESSMENT — COGNITIVE AND FUNCTIONAL STATUS - GENERAL
DRESSING REGULAR LOWER BODY CLOTHING: A LITTLE
SUGGESTED CMS G CODE MODIFIER DAILY ACTIVITY: CI
DAILY ACTIVITIY SCORE: 23
MOBILITY SCORE: 24
SUGGESTED CMS G CODE MODIFIER MOBILITY: CH

## 2025-01-31 ASSESSMENT — FIBROSIS 4 INDEX: FIB4 SCORE: 0.9

## 2025-01-31 ASSESSMENT — PAIN DESCRIPTION - PAIN TYPE: TYPE: ACUTE PAIN

## 2025-01-31 NOTE — PROGRESS NOTES
Rapid Response Summary     Rapid response called at 0326 for: Chest Pain  and Tachycardia     VS: Tachycardia  and Hypotensive (See Vitals Flowsheet)  Additional info: SVT rate 170s on EKG, Patient complaining of crushing chest pain    MD Paged: Agnieszka Woods  Interventions:    Imaging/Tests: EKG    Labs: Magnesium   Medications: Metoprolol  and Amiodarone   Other: N/A  Disposition: Improved with rapid response team interventions. Primary RN updated on plan of care. Transfer not indicated at this time.  and Rapid team will continue to follow the patient.

## 2025-01-31 NOTE — DISCHARGE PLANNING
26 year old female presented for For anal fissure. She started on MiraLAX and diltiazem with lidocaine treatment. She feels better her rectal pain has improved she pulled when she stopped MiraLAX the pain has recurred. No nausea no vomiting no blood in the stool overall she is feeling better Discharge Appointments/outpatient referrals/ and HH set up:    Cardiac surgery follow up appointment made for 4-5 weeks out    Hospital discharge team/schedulers called for cardiology f/u appointment for MD or APRN within 3-4 weeks if possible.    Aortic surveillance program/vascular medicine referral needed, orders placed.    Anticoagulation referral/ coumadin clinic referral not needed and orders not placed.    INR draws are not indicated for tissue aortic root replacement procedure.      Will await PT/OT notes and medical progression to determine further discharge needs.

## 2025-01-31 NOTE — CODE DOCUMENTATION
Leeroy BELLO.    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 He additionally has a partial right congenital diaphragmatic hernia (CDH) with part of the right kidney in the chest cavity, which was not able to be repaired (attempted by general surgery). He is s/p coil occlusion of 2 and 3 aorto-pulmonary (A-P) collateral vessels (3/14/23 and 23). He is also s/p omphalo-mesenteric fistula closure and appendectomy on 3/31/23. His hospital course has been complicated by pulmonary hypertension (on pulmonary vasodilators) and required reintubation 23 for severe respiratory distress. His LPA is severely dilated and his PDA remained open with L-> R shunting. He was transferred to the PICU from the NICU (23) s/p PDA ligation.      Key Hospital Events Summary:   - Chad was found to have severe PH on DOL 2 needing intubation and starting on PGE to enlarge the PDA as a pop-off for the PH to support the RV. Epi added for inotropic support.  Post cath (after AP collateral coiling on 3/14/23) echo showed evidence of mildly improved PH with trivial TR, larger PDA with bidirectional shunting, and PFO with L>R shunting. PGE was discontinued 3/15/23.   - 4/10: Echocardiogram showed worsening RVSP, started on Tosha and diuresis, subsequent echocardiogram showed obstructed infracardiac partial anomalous drainage of the right pulmonary vein into a small vertical vein into the IVC.  - : Second diagnostic and interventional cath performed: near systemic RVSP and  multiple AV collaterals supplying vascular network between right lung and liver. 3 medium size AV collaterals were coiled. Significantly elevated pulmonary artery pressures at the beginning of the case (approximately 80% systemic on room air) interestingly much lower at the end of the case (46% systemic) on 30% FIO2.  - 4/28: Nitric oxide discontinued   - 5/7: Extubated to NIPPV  - 5/15: Sildenafil started   - 6/14 +enter/rhinovirus  - 7/5: Reintubated for acute hypercapneic respiratory failure  - 7/7 CTA with several notable findings, including hyperemic left lung and oligemic right lung  - 7/8 Macitentan discontinued (5/23/23-7/8/23)  - 7/10 bedside bronch with ENT  - 7/11/23 PDA ligation; transfer to PICU  - 7/21extubated to NIV BREWSTER  - 7/22 bedside bronch with ENT  - 7/23 transitioned from HFNC to NIV PC  - 8/4 HFNC during the day, NIV PC qhs    INTERVAL HX:   - no acute events overnight   - no prns given, CAPD 0, AIMEE 1    OBJECTIVE:    VITAL SIGNS:     Vital Last Value 24 Hour Range   Temperature 98.1 °F (36.7 °C) (08/23/23 0800) Temp  Min: 97.2 °F (36.2 °C)  Max: 99.3 °F (37.4 °C)   Pulse 145 (08/23/23 0800) Pulse  Min: 116  Max: 159   Respiratory 41 (08/23/23 0800) Resp  Min: 19  Max: 62   Non-Invasive  Blood Pressure 96/49 (08/23/23 0800) BP  Min: 74/60  Max: 111/59   Pulse Oximetry 99 % (08/23/23 0800) SpO2  Min: 93 %  Max: 100 %     Vital Today Admitted   Weight 8.865 kg (19 lb 8.7 oz) (08/22/23 1100) Weight: 4.04 kg (8 lb 14.5 oz) (03/08/23 1200)   Height N/A Length: 21.26\" (54 cm) (03/08/23 1200)   Body Mass Index N/A BMI (Calculated): 13.85 (03/08/23 1200)     INTAKE/OUTPUT:    Date 08/22/23 0700 - 08/23/23 0659 08/23/23 0700 - 08/24/23 0659   Shift 2565-0124 1100-9004 8155-9394 24 Hour Total 3989-7349 7644-9896 4154-7869 24 Hour Total   INTAKE   I.V.(mL/kg) 11.36(1.28) 7(0.79) 8(0.9) 26.36(2.97) 2(0.23)   2(0.23)   NG/GT(mL/kg) 271.9(30.67) 391.6(44.17) 390(43.99) 1053.5(118.84)       Shift  Total(mL/kg) 283.26(31.95) 398.6(44.96) 398(44.9) 1079.86(121.81) 2(0.23)   2(0.23)   OUTPUT   Urine(mL/kg/hr) 323(4.55) 191(2.69) 232(3.27) 746(3.51) 108   108   Shift Total(mL/kg) 323(36.44) 191(21.55) 232(26.17) 746(84.15) 108(12.18)   108(12.18)   Weight (kg) 8.86 8.86 8.86 8.86 8.86 8.86 8.86 8.86           Intake/Output Summary (Last 24 hours) at 2023 0907  Last data filed at 2023 0849  Gross per 24 hour   Intake 1077.46 ml   Output 854 ml   Net 223.46 ml     PHYSICAL EXAM:    Physical Exam  Vitals and nursing note reviewed.   Constitutional:       Comments: Awake, alert, cooing/ babbling on exam   HENT:      Head: Normocephalic. Anterior fontanelle is flat.      Right Ear: External ear normal.      Left Ear: External ear normal.      Nose: Nose normal.      Comments: NG tube and HFNC in place     Mouth/Throat:      Mouth: Mucous membranes are moist.   Eyes:      Pupils: Pupils are equal, round, and reactive to light.   Cardiovascular:      Rate and Rhythm: Normal rate and regular rhythm.      Pulses: Normal pulses.      Heart sounds: Murmur (2/6) heard.      Comments: Right-sided precordium and heart sounds   Pulmonary:      Effort: Pulmonary effort is normal. No respiratory distress.      Breath sounds: No wheezing.      Comments: Scattered occasional rhonchi, comfortable, mildly tachypneic with mild subcostal retractions (baseline)  Abdominal:      General: Bowel sounds are normal. There is distension (mild).      Palpations: Abdomen is soft.      Tenderness: There is no abdominal tenderness.   Musculoskeletal:         General: No swelling. Normal range of motion.      Cervical back: Neck supple.   Skin:     General: Skin is warm.      Capillary Refill: Capillary refill takes less than 2 seconds.      Turgor: Normal.   Neurological:      General: No focal deficit present.      Motor: No abnormal muscle tone.      Comments: Moving all extremities spontaneously, babbling, poor head control          RESPIRATORY SUPPORT:  Vent Settings:     Setting Last Value (24 Hour)   Mode     TV Set      TV Observed 17.6 mL (23)   PIP Set 20 cmH20 (23)   PIP Observed 20 cm H2O (23)   BREWSTER Level    PEEP 7 cm H20 (23)   RR Set 20 (23)   RR Observed     PS     MAP     ITime     FiO2 30 % (23 0731)   SpO2 99 % (23 0800)    Nitric Oxide      EtCO2       Oxygen Therapy: Oxygen Therapy  O2 Device: High flow nasal cannula  O2 Flow Rate (L/min): 15 L/min  FiO2 (%): 30 %  EtCO2 mmH mmHg    Recent Results    No results found for this or any previous visit (from the past 24 hour(s)).    Last Imaging    XR CHEST AND ABDOMEN INFANT 1 VIEW  Narrative: PROCEDURE:  XR  Chest and Abdomen 1 View    TECHNIQUE: Portable chest including abdomen    HISTORY: NG tube placement; Hypoxemia NG tube placement; Hypoxemia    COMPARISONS:  2023. .    FINDINGS:  Lines:  1.  Enteric tube tip is in the left upper quadrant stomach.  2.  Partially visualized peripheral catheter in the right upper arm.    Heart/mediastinum: Dextrocardia with probable cardiomegaly. The heart is obscured by lung opacification. Multiple vascular embolization coils are in the right lower chest..  Lungs: Low lung volume. Increased left upper lobe opacification is likely atelectasis. Near-complete opacification of the right lung is likely a combination of atelectasis and pulmonary edema..  Pleural space:  Normal.  Bowel gas pattern: Moderately distended gas-filled bowel throughout the abdomen and pelvis is unchanged.  Pneumatosis: None evident  Free air: None evident  Calcifications:  None .  Bones:  No acute osseous abnormality .  Impression: 1.  Cardiomegaly with dextrocardia.  2.  Bilateral atelectasis, increased in the left upper lobe. Probable pulmonary edema, greater on the right.  3.  Moderately distended bowel is unchanged. No pneumatosis or free air evident.  4.  Lines and tubes  as above.    This document is electronically signed by Mirtha Chavez ([facility] pediatric radiologist), on 2023 23:31 PM CDT.          MEDICATIONS:    Current Facility-Administered Medications   Medication Dose Route Frequency Provider Last Rate Last Admin    [START ON 2023] dextrose 5 % / sodium chloride 0.9% infusion   Intravenous Continuous Mami Zelaya DO        sodium chloride 0.9% infusion   Intravenous Continuous Cristino Jack MD   Stopped at 08/22/23 0925     Current Facility-Administered Medications   Medication Dose Route Frequency Provider Last Rate Last Admin    diazePAM (VALIUM) 5 MG/5ML oral solution 1.4 mg  1.4 mg Oral 3 times per day Mami Zelaya DO        polyethylene glycol (MIRALAX) packet 8.5 g  8.5 g Oral Daily Aida Garces DO   8.5 g at 08/22/23 0928    dexMEDEtomidine (PRECEDEX) bolus from bag 0.8 mcg  0.1 mcg/kg (Dosing Weight) Intravenous Once Chapo Alejandre DO        QUEtiapine (SEROQUEL) 10 MG/ML (compounded) oral suspension 6 mg  6 mg Per NG Tube BID Rianna Gibbs CNP   6 mg at 08/23/23 0547    QUEtiapine (SEROQUEL) 10 MG/ML (compounded) oral suspension 9 mg  9 mg Per NG Tube QHS Darleen Ham DO   9 mg at 08/22/23 2049    budesonide (PULMICORT) nebulizer suspension 0.5 mg  0.5 mg Nebulization BID Resp Darleen Leo CNP   0.5 mg at 08/23/23 0731    sodium chloride 3 % nebulizer solution 4 mL  4 mL Nebulization Q8H Adriana Gonzales MD   4 mL at 08/23/23 0731    albuterol (VENTOLIN) nebulizer 2.5 mg  2.5 mg Nebulization Q4H Adriana Gonzales MD   2.5 mg at 08/23/23 0731    furosemide ORAL (LASIX) oral solution 15 mg  15 mg Per NG Tube BID Abelino Fuller MD   15 mg at 08/23/23 0603    sildenafil citrate (REVATIO) 10 MG/ML oral suspension 9 mg  9 mg Oral 3 times per day Darleen Leo CNP   9 mg at 08/23/23 0547    chlorothiazide (DIURIL) 50 mg/mL oral suspension 85 mg  10 mg/kg Oral 3 times per day Cristino Jack MD    85 mg at 08/23/23 0546    potassium CHLORIDE choco/ped oral liquid 8 mEq  1 mEq/kg (Dosing Weight) Oral Daily Darleen Leo CNP   8 mEq at 08/22/23 0928    spironolactone 25 MG/5ML (CAROSPOR,ALDACTONE) oral suspension 8 mg  1 mg/kg (Order-Specific) Per NG Tube Q12H Cristino Jack MD   8 mg at 08/22/23 2029    melatonin 1 MG/ML solution 3 mg  3 mg Oral Nightly Rianna Gibbs CNP   3 mg at 08/22/23 2049    amoxicillin (AMOXIL) 400 MG/5ML suspension 80 mg  10 mg/kg (Order-Specific) Per NG Tube 2 times per day Cristino Jack MD   80 mg at 08/22/23 2049    gabapentin (NEURONTIN) 250 MG/5ML solution 80 mg  10 mg/kg (Order-Specific) Per NG Tube TID Cristino Jack MD   80 mg at 08/22/23 2346    sodium chloride 4 mEq/mL oral solution 12 mEq  1.5 mEq/kg (Order-Specific) Per NG Tube 4 times per day Cristino Jack MD   12 mEq at 08/23/23 0547    sodium chloride (PF) 0.9 % injection 0.5-1 mL  0.5-1 mL Intravenous Q6H Chelsie Sykes CNP   1 mL at 08/23/23 0849    pediatric multivitamin with iron (POLY-VI-SOL WITH IRON) oral solution 1 mL  1 mL Per NG Tube Daily Chastity Sanders DO   1 mL at 08/22/23 0928     Current Facility-Administered Medications   Medication Dose Route Frequency Provider Last Rate Last Admin    simethicone (MYLICON) 40 MG/0.6ML drops 20 mg  20 mg Per NG Tube 4x Daily PRN Aida Garces,         cloNIDine (CATAPRES) 20 MCG/ML (compounded) oral suspension 16 mcg  2 mcg/kg (Dosing Weight) Oral Q6H PRN Darleen Leo CNP        lactulose (CHRONULAC) 10 GM/15ML solution 8 g  1 g/kg (Dosing Weight) Oral PRN Aida Garces,         sodium chloride (PF) 0.9 % injection 25 mL  25 mL Intravenous PRN Chelsie Sykes CNP        acetaminophen (TYLENOL) 160 MG/5ML suspension 121.6 mg  15 mg/kg (Order-Specific) Oral Q6H PRN Cristino Jack MD   121.6 mg at 08/19/23 1025    lidocaine (ANECREAM/LMX) 4 % cream 1 application.  1 application. Topical PRN  Chelsie Sykes L, CNP        sodium chloride (PF) 0.9 % injection 0.5-1 mL  0.5-1 mL Intravenous PRN Sykes Chelsie L, CNP        glycerin pediatric suppository 1 suppository  1 suppository Rectal Daily PRN Sykes Chelsie L, CNP   1 suppository at 23 2303    albuterol (VENTOLIN) nebulizer 2.5 mg  2.5 mg Nebulization Q3H PRN Antonette Jara, DO   2.5 mg at 23 0538      PRINCIPAL PROBLEM:  Acute on chronic respiratory failure with hypoxia and hypercapnia (CMD)      ACTIVE PROBLEMS:    Active Hospital Problems    Diagnosis     PPHN (persistent pulmonary hypertension in )     Acute on chronic respiratory failure with hypoxia and hypercapnia (CMD)     Congenital diaphragmatic hernia     Partial anomalous pulmonary venous connection (PAPVC), scimitar     Dextrocardia     Ectopic kidney     Dextrorotation of heart                         ASSESSMENT:    Boy Capry Greyer \"Chad\" is a 5 month old male PMH dextrocardia, polysplenia, 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 He is s/p coil occlusion of 2 and 3 aorto-pulmonary (A-P) collateral vessels (3/14/23 and 23) and s/p omphalo-mesenteric fistula closure and appendectomy on 3/31/23.     His hospital course has been complicated by pulmonary hypertension (on pulmonary vasodilators) and required reintubation 23 for severe respiratory distress. He is s/p PDA ligation (). Extubated on  and now tolerating daytime HFNC and night time NIV PC respiratory support. He is requiring  PICU level of care for management of chronic sedation wean, non-invasive PPV and cardiopulmonary care given his high risk for cardiopulmonary decompensation.       PLAN:    Neuro:  #Sedation/prolonged narcotic exposure  - s/p Precedex 8/22  - Clondine 2mcg/kg q6 prn   - Melatonin 3mg qhs  - CAPD q12h  - Gabapentin 10mg/kg/dose q8h  - Seroquel 6mg BID (6a, 1p) + 9mg at bedtime (9p)  - Diazepam 1.5mg q8h >1.4 mg PO q8 (begin wean today)    Resp:  #Chronic resp failure, chronic lung disease  - 2L/kg HFNC 6am-12am, will hold on extending the length of HFNC until after OR  - NIV-PC settings: PIP20 Peep7 R20 (12am-6am)  - Pulmonology on consult; aware of updated plan for surgery next week, will place pulmonolgy clearance in progress note.  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 Bahena updated, pediatric cardiac anesthesia requested and plan for Tosha intra-operatively  - Patient is an ECMO or ECPR candidate per Dr. Martins as of 2023  - Cardioprotective electrolytes (K>4.0, Mg>2.0, iCal>1.2)  - Aldactone 1mg/kg/dose q12hrs   - Sildenafil 1mg/kg q8hrs  - Echo (8/14): persistently elevated RV systolic pressure, systolic flattening of interventricular septum, PA pressures likely >1/2 systemic; no significant changes from previous echo    - planning for partial pneumonectomy with Gtube on Thurday 8/24 in conjunction with peds surgery who will place gtube as well.     ENT:  - 7/11 bronch by ENT (Dr. Lorenzo) at bedside: malacia of right mainstem bronchus with eschar present, abnormal angle of right upper lobe bronchus  with significant malacia, bronchus intermedius with collapse, left mainstem bronchus normal  - 7/22 bedside laryngoscopy (Dr. Shay): very mild tongue base and lateral pharyngeal wall collapse appreciated, but did not worsen in the supine position. A clear view of the larynx was achieved. No significant laryngomalacia appreciated.    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 No intervention at this time.    ID:   #Functional asplenia  - Amoxicillin 72mg NG q12h      Heme:  - Iron (21), TIBC (202), Iron sat (10); repeat 8/14 WNL  - s/p IV Iron 3mg/kg IV daily x 2 doses (7/19 & 7/20)  - s/p PRBCs (7/20)    #R femoral venous thrombus  - Xarelto 2.4mg q8h (s7/25) - when patient weight is 9kg, will increase xarelto to 2.8mg (per anticoagulation team, Dr. Bahena)  - hold xarelto 48hrs prior to OR case (last dose given 8/21 10pm)  - T&S and BNP completed 8/22    Ophthal:  - Ophthalomology on consult: exam performed (7/26) by Dr. 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); see Tressa's note (7/26)    Immunizations:  #Underimmunized  - Pediarix (DTap/Hep B/ IPV) 4/8/23       Catch-up Vaccine Schedule with No Prior Doses Administered  Vaccine Required doses 1st dose 2nd dose 3rd dose 4th dose 5th dose   Diphtheria, tetanus, and  acellular pertussis (DTaP) 3 doses  P: Pediarix  D: 4/8/23 P: Pediarix  D: 8/17/23 P:  D: OK to give after 9/14/23 P:  D: P:  D:   Hepatitis B 3 doses P: Pediarix  D: 4/8/23 P: Pediarix  D: 8/17/23 P:  D: OK to give after 10/12/23     Haemophilus influenzae type b 3 or 4 doses  (see below) P: PedvaxHib  D: 8/17/23 P:  D: OK to give after 9/14/23 P:  D: P:  D:    Pneumococcal conjugate (PCV 13) 4 doses P: Prevnar 13  D: 8/17/23 P:  D: OK to give after 9/14/23 P:  D: P:  D:    Inactivated Poliovirus (IPV) 4 doses P: Pediarix  D: 4/8/23 P: Pediarix  D: 8/17/23 P:  D: OK to give after 9/14/23 P:  D:    Influenza 1 or 2  (see below) P:  D: OK to give after 9/8/23 P:  D:      P = Vaccine product; D = Date of vaccination      OTHER:  - DCFS gave consent for potential PICC placement     Social:  - DCFS took temporary custody as of 7/24, consent obtained for partial pneumonectomy and Gtube and placed in patient chart, -next court date scheduled for 8/28/23  - 7/12, 7/28  care conference  -grandmother updated by Dr. Zelaya 8/22   - Social work (Roberto Avila/MONI CASILLAS) on consult        VTE: 3 (PICU, CHD, clot) - encourage highest degree of ambulation/mobility at least 3x daily  Lines: R forearm ML - Lines discussed on rounds (Functional, Utilized, Necessary)  PICU UP Level: 3, turn q2h and prn, PT/OT on consult  Dispo: PICU    Plan discussed with PICU attending physician, consulting physicians, and inpatient team.    Rianna SAN  Pediatric Critical Care   2023 9:07 AM

## 2025-01-31 NOTE — CARE PLAN
The patient is Stable - Low risk of patient condition declining or worsening    Shift Goals  Clinical Goals: ambulation, IS, accu checks  Patient Goals: rest  Family Goals: updates    Progress made toward(s) clinical / shift goals:    Problem: Knowledge Deficit - Standard  Goal: Patient and family/care givers will demonstrate understanding of plan of care, disease process/condition, diagnostic tests and medications  Outcome: Progressing     Problem: Skin Integrity  Goal: Skin integrity is maintained or improved  Outcome: Progressing     Problem: Fall Risk  Goal: Patient will remain free from falls  Outcome: Progressing     Problem: Post op day 2 CABG/Heart Valve Replacement  Goal: Optimal care of the post op CABG/heart valve replacement post op day 2  Outcome: Progressing  Intervention: Daily weights in the morning  Note: Completed    Intervention: Up in chair for all meals  Note: Completed  Intervention: IS q 1 hour while awake and record best IS volume  Note: Completed, IS 3500  Intervention: Consider removal of cano and chest tube if not already done  Note: completed       Patient is not progressing towards the following goals:      Problem: Bowel Elimination - Post Surgical  Goal: Patient will resume regular bowel sounds and function with no discomfort or distention  Outcome: Not Progressing

## 2025-01-31 NOTE — PROGRESS NOTES
Received bedside report from RN, pt care assumed, VSS, pt assessment complete. Pt AAOx4, with no reports of pain at this time. No signs of acute distress noted at this time. Plan of care discussed with pt and verbalizes no questions. Pt denies any additional needs at this time. Bed locked/in lowest position, bed alarm is on, pt educated on fall risk and verbalized understanding, call light within reach, hourly rounding initiated.

## 2025-01-31 NOTE — PROGRESS NOTES
Monitor Summary   SR W/ BBB: 73-77  Ectopy: PAC, O)PVC  .17/.13/.40    *events: SVT 170s

## 2025-01-31 NOTE — PROGRESS NOTES
Pulmonary/Critical Care Medicine   Progress Note    Date of service: 1/31/2025  Time: 0445    Mr. Vail is a 64 year old male with the past medical history significant for tobacco abuse, hyperlipidemia, necrotizing fasciitis s/p skin grafts, methamphetamine abuse, and ascending aortic aneurysm who underwent elective aortic root replacement on 1/28/2025.  The patient had an uneventful postoperative course and was extubated.  He was transferred to the telemetry floor.  In the wee hours of 1/31/2025 the patient had sudden onset of palpitations and was noted to be in SVT.  The rapid response team came to bedside and I was summoned as well.  The patient was given 5 mg of IV metoprolol for heart rate in the 170s that brought it down to the 150s.  He was then given 6 mg of adenosine with cardioversion pads in place.  The patient had a 2-second pause and then converted to sinus rhythm.  He will be continued on an amiodarone infusion and will not need transfer to the ICU at this time.  The patient remained hemodynamically stable throughout this procedure.    Critical Care time: 35 min. No time overlap, procedures not included in time.    Agnieszka Fraga MD  Pulmonary and Critical Care Medicine

## 2025-01-31 NOTE — PROGRESS NOTES
Rapid Response Summary     Rapid response called at 1154 for: Tachycardia  SVT - monitor check    VS: Tachycardia  (See Vitals Flowsheet)  MD Paged: Mary Perry  Interventions:    Imaging/Tests: EKG    Labs: N/A   Medications: Metoprolol  5mg per physician, repeat dose 5mg IV   Repeat EKG ordered  Disposition: Improved with rapid response team interventions. Primary RN updated on plan of care. Rapid team will continue to follow the patient.

## 2025-01-31 NOTE — PROGRESS NOTES
Cardiovascular Surgery Progress Note    Name: Donovan Vail  MRN: [id number]  : 1960  Admit Date: 2025  5:10 AM  3 Days Post-Op     Procedure:  Procedure(s) and Anesthesia Type:  Dr Smith; 25     * AORTIC ROOT REPLACEMENT (Bio-Bentall 29 mm Konect Corrigan valved conduit, coronary artery reimplantation), ascending aortic aneurysm hemiarch repair (32 mm tube graft), circulatory arrest (25 minutes) and intraoperative transesophageal echocardiography     Vitals:  Vitals:    25 0403 25 0408 25 0530 25 0758   BP: 105/63 96/62 115/84 95/59   Pulse: 77 73 (!) 144 71   Resp: 18 16  14   Temp:    36.5 °C (97.7 °F)   TempSrc:    Temporal   SpO2: 95% 96% 94% 96%   Weight:       Height:          Temp (24hrs), Av.4 °C (97.6 °F), Min:36.2 °C (97.2 °F), Max:36.7 °C (98.1 °F)      Respiratory:    Respiration: 14, Pulse Oximetry: 96 %       Fluids:    Intake/Output Summary (Last 24 hours) at 2025 1007  Last data filed at 2025 0339  Gross per 24 hour   Intake 650 ml   Output 0 ml   Net 650 ml     Admit weight: Weight: 88.5 kg (195 lb 1.7 oz)  Current weight: Weight: 86.6 kg (190 lb 14.7 oz) (25 0600)    Labs:  Recent Labs     25  0435 25  0220 25  0012   WBC 17.9* 21.6* 17.2*   RBC 4.78 4.55* 4.16*   HEMOGLOBIN 13.4* 12.9* 11.5*   HEMATOCRIT 39.3* 37.5* 34.4*   MCV 82.2 82.4 82.7   MCH 28.0 28.4 27.6   MCHC 34.1 34.4 33.4   RDW 45.6 46.1 47.1   PLATELETCT 161* 159* 166   MPV 9.4 9.6 9.9     Recent Labs     25  0435 25  0220 25  0012   SODIUM 136 136 133*   POTASSIUM 4.5 4.5 4.3   CHLORIDE 106 102 102   CO2 19* 22 24   GLUCOSE 114* 137* 98   BUN 22 19 21   CREATININE 0.97 0.69 0.85   CALCIUM 8.2* 8.7 8.4*     Recent Labs     25  1339   APTT 36.2*   INR 1.44*       HgbA1c:  6.0    Diabetic Educator Consult:  N/A    Medications:  Scheduled Medications   Medication Dose Frequency    amiodarone infusion  1 mg/min Once    amLODIPine   10 mg Q DAY    rosuvastatin  5 mg Q EVENING    enoxaparin (LOVENOX) injection  40 mg DAILY AT 1800    Non nasal  swab  1 Applicator BID    metoprolol tartrate  25 mg BID    aspirin  81 mg DAILY    Pharmacy Consult Request  1 Each PHARMACY TO DOSE    acetaminophen  1,000 mg Q6HRS    senna-docusate  2 Tablet BID    And    polyethylene glycol/lytes  1 Packet DAILY    And    magnesium hydroxide  30 mL DAILY    omeprazole  20 mg DAILY        Exam:   Physical Exam  Vitals and nursing note reviewed.   Constitutional:       General: He is not in acute distress.     Appearance: Normal appearance.   HENT:      Head: Normocephalic.      Right Ear: External ear normal.      Left Ear: External ear normal.      Nose: Nose normal. No congestion.      Mouth/Throat:      Mouth: Mucous membranes are moist.      Pharynx: Oropharynx is clear.   Eyes:      Extraocular Movements: Extraocular movements intact.      Pupils: Pupils are equal, round, and reactive to light.   Cardiovascular:      Rate and Rhythm: Normal rate and regular rhythm.      Pulses: Normal pulses.      Heart sounds: Normal heart sounds.   Pulmonary:      Effort: Pulmonary effort is normal.   Abdominal:      General: Bowel sounds are decreased. There is no distension.      Palpations: Abdomen is soft.   Musculoskeletal:      Cervical back: Normal range of motion and neck supple. No tenderness.      Right lower leg: No edema.      Left lower leg: No edema.   Skin:     General: Skin is warm and dry.      Comments: Sternotomy: open to air, CDI   Neurological:      General: No focal deficit present.      Mental Status: He is alert and oriented to person, place, and time. Mental status is at baseline.   Psychiatric:         Mood and Affect: Mood normal.         Behavior: Behavior normal.         Thought Content: Thought content normal.         Cardiac Medications:    ASA - Yes    Plavix - No; contraindicated because of Other aortic root    Post-operative Beta  Blockers - Yes    Ace/ARB- No; contraindicated because of Normal EF    ARNI-  No; contraindicated because of Normal EF    Statin - No; contraindicated because of No CAD    Aldactone- No; contraindicated because of Normal EF    SGLT2i-  No; contraindicated because of Normal EF    Ejection Fraction:  65%    Telemetry:   1/29 a fib overnight, now SB/SR  1/30 SR  1/31 SVT overnight, now SR    Assessment/Plan:  POD 1  HTN, a fib last night- amio d/c'd due to conversion to SB, neuro intact, wounds intact, abdomen soft, fluid balance negative, wt stable,  2 L NC.  Plan:  Keep chest tubes and pacing wires. Add amlodipine. IS/ambulate.    POD 2 HTN requiring PRNs, SR, Room air. Neuro intact, wounds CDI. ABD soft, NT, BM-. Fluid neg, wt below admit, good uop, Cr: 0.69. Chest tubes with 110 out overnight, H/h: 12.9/37.5. Plan: Remove chest tubes and cano. Keep pacer wires. Increase amlodipine. Transfer to tele. Encourage IS/Ambulation.     POD 3  HDS, SR- SVT overnight requiring metop and adenosine, neuro intact, wounds intact, abdomen soft no BM, fluid balance negative, wt down, room air.  Plan: Stop amio gtt this afternoon, resume home troprol xl, IS/ambulate. Pacing wires removed. Anticipate home 1-2 days.     Disposition:  Home

## 2025-01-31 NOTE — PROGRESS NOTES
Monitor check called for patient being in SVT with a heart rate sustaining in the 150's. Rapid response called and CT EUGENIA Perry notified.

## 2025-01-31 NOTE — PROGRESS NOTES
Bedside report recieved and patient care assumed. Pt is resting in bed, A&O 4,  with no complaints of pain, and is on RA. Tele box on, all fall precautions are in place, belongings at bedisde table. Pt was updated on POC, no questions or concerns. Patient educated on the use of call light for assistance.

## 2025-02-01 LAB
ANION GAP SERPL CALC-SCNC: 9 MMOL/L (ref 7–16)
BUN SERPL-MCNC: 18 MG/DL (ref 8–22)
CALCIUM SERPL-MCNC: 8.6 MG/DL (ref 8.5–10.5)
CHLORIDE SERPL-SCNC: 102 MMOL/L (ref 96–112)
CO2 SERPL-SCNC: 22 MMOL/L (ref 20–33)
CREAT SERPL-MCNC: 0.83 MG/DL (ref 0.5–1.4)
ERYTHROCYTE [DISTWIDTH] IN BLOOD BY AUTOMATED COUNT: 47 FL (ref 35.9–50)
GFR SERPLBLD CREATININE-BSD FMLA CKD-EPI: 99 ML/MIN/1.73 M 2
GLUCOSE SERPL-MCNC: 99 MG/DL (ref 65–99)
HCT VFR BLD AUTO: 37.8 % (ref 42–52)
HGB BLD-MCNC: 12.6 G/DL (ref 14–18)
MCH RBC QN AUTO: 27.8 PG (ref 27–33)
MCHC RBC AUTO-ENTMCNC: 33.3 G/DL (ref 32.3–36.5)
MCV RBC AUTO: 83.4 FL (ref 81.4–97.8)
PLATELET # BLD AUTO: 199 K/UL (ref 164–446)
PMV BLD AUTO: 9.7 FL (ref 9–12.9)
POTASSIUM SERPL-SCNC: 4.5 MMOL/L (ref 3.6–5.5)
RBC # BLD AUTO: 4.53 M/UL (ref 4.7–6.1)
SODIUM SERPL-SCNC: 133 MMOL/L (ref 135–145)
WBC # BLD AUTO: 14.9 K/UL (ref 4.8–10.8)

## 2025-02-01 PROCEDURE — 700111 HCHG RX REV CODE 636 W/ 250 OVERRIDE (IP): Performed by: NURSE PRACTITIONER

## 2025-02-01 PROCEDURE — 770020 HCHG ROOM/CARE - TELE (206)

## 2025-02-01 PROCEDURE — 99024 POSTOP FOLLOW-UP VISIT: CPT | Performed by: NURSE PRACTITIONER

## 2025-02-01 PROCEDURE — 700102 HCHG RX REV CODE 250 W/ 637 OVERRIDE(OP): Performed by: NURSE PRACTITIONER

## 2025-02-01 PROCEDURE — 700111 HCHG RX REV CODE 636 W/ 250 OVERRIDE (IP): Performed by: INTERNAL MEDICINE

## 2025-02-01 PROCEDURE — A9270 NON-COVERED ITEM OR SERVICE: HCPCS | Performed by: INTERNAL MEDICINE

## 2025-02-01 PROCEDURE — 85027 COMPLETE CBC AUTOMATED: CPT

## 2025-02-01 PROCEDURE — 700101 HCHG RX REV CODE 250: Performed by: NURSE PRACTITIONER

## 2025-02-01 PROCEDURE — 700102 HCHG RX REV CODE 250 W/ 637 OVERRIDE(OP): Performed by: INTERNAL MEDICINE

## 2025-02-01 PROCEDURE — A9270 NON-COVERED ITEM OR SERVICE: HCPCS

## 2025-02-01 PROCEDURE — RXMED WILLOW AMBULATORY MEDICATION CHARGE: Performed by: NURSE PRACTITIONER

## 2025-02-01 PROCEDURE — 80048 BASIC METABOLIC PNL TOTAL CA: CPT

## 2025-02-01 PROCEDURE — 99254 IP/OBS CNSLTJ NEW/EST MOD 60: CPT | Performed by: INTERNAL MEDICINE

## 2025-02-01 PROCEDURE — 700102 HCHG RX REV CODE 250 W/ 637 OVERRIDE(OP)

## 2025-02-01 PROCEDURE — A9270 NON-COVERED ITEM OR SERVICE: HCPCS | Performed by: NURSE PRACTITIONER

## 2025-02-01 RX ORDER — AMIODARONE HYDROCHLORIDE 200 MG/1
400 TABLET ORAL 3 TIMES DAILY
Status: DISCONTINUED | OUTPATIENT
Start: 2025-02-01 | End: 2025-02-02 | Stop reason: HOSPADM

## 2025-02-01 RX ORDER — TRAMADOL HYDROCHLORIDE 50 MG/1
50 TABLET ORAL EVERY 6 HOURS PRN
Qty: 56 TABLET | Refills: 0 | Status: SHIPPED | OUTPATIENT
Start: 2025-02-01 | End: 2025-02-15

## 2025-02-01 RX ORDER — ACETAMINOPHEN 500 MG
1000 TABLET ORAL EVERY 6 HOURS PRN
Status: SHIPPED
Start: 2025-02-01

## 2025-02-01 RX ORDER — AMLODIPINE BESYLATE 10 MG/1
10 TABLET ORAL DAILY
Qty: 100 TABLET | Refills: 3 | Status: SHIPPED | OUTPATIENT
Start: 2025-02-02 | End: 2026-03-09

## 2025-02-01 RX ORDER — AMIODARONE HYDROCHLORIDE 200 MG/1
200 TABLET ORAL DAILY
Status: DISCONTINUED | OUTPATIENT
Start: 2025-02-01 | End: 2025-02-01

## 2025-02-01 RX ORDER — AMIODARONE HYDROCHLORIDE 200 MG/1
200 TABLET ORAL DAILY
Qty: 30 TABLET | Refills: 2 | Status: SHIPPED | OUTPATIENT
Start: 2025-02-01 | End: 2025-02-02

## 2025-02-01 RX ORDER — ASPIRIN 81 MG/1
81 TABLET ORAL DAILY
Qty: 100 TABLET | Refills: 3 | Status: SHIPPED | OUTPATIENT
Start: 2025-02-02

## 2025-02-01 RX ORDER — MAGNESIUM SULFATE HEPTAHYDRATE 40 MG/ML
4 INJECTION, SOLUTION INTRAVENOUS ONCE
Status: COMPLETED | OUTPATIENT
Start: 2025-02-01 | End: 2025-02-01

## 2025-02-01 RX ADMIN — METOPROLOL SUCCINATE 50 MG: 50 TABLET, EXTENDED RELEASE ORAL at 05:30

## 2025-02-01 RX ADMIN — ACETAMINOPHEN 1000 MG: 500 TABLET ORAL at 13:17

## 2025-02-01 RX ADMIN — SENNOSIDES AND DOCUSATE SODIUM 2 TABLET: 50; 8.6 TABLET ORAL at 17:28

## 2025-02-01 RX ADMIN — METOPROLOL TARTRATE 5 MG: 5 INJECTION INTRAVENOUS at 13:11

## 2025-02-01 RX ADMIN — OMEPRAZOLE 20 MG: 20 CAPSULE, DELAYED RELEASE ORAL at 05:32

## 2025-02-01 RX ADMIN — POLYETHYLENE GLYCOL 3350 1 PACKET: 17 POWDER, FOR SOLUTION ORAL at 05:32

## 2025-02-01 RX ADMIN — MAGNESIUM SULFATE HEPTAHYDRATE 4 G: 4 INJECTION, SOLUTION INTRAVENOUS at 15:12

## 2025-02-01 RX ADMIN — ACETAMINOPHEN 1000 MG: 500 TABLET ORAL at 17:27

## 2025-02-01 RX ADMIN — ROSUVASTATIN CALCIUM 5 MG: 10 TABLET, FILM COATED ORAL at 17:28

## 2025-02-01 RX ADMIN — MAGNESIUM HYDROXIDE 30 ML: 2400 SUSPENSION ORAL at 05:33

## 2025-02-01 RX ADMIN — APIXABAN 5 MG: 5 TABLET, FILM COATED ORAL at 17:27

## 2025-02-01 RX ADMIN — AMIODARONE HYDROCHLORIDE 200 MG: 200 TABLET ORAL at 13:17

## 2025-02-01 RX ADMIN — ACETAMINOPHEN 1000 MG: 500 TABLET ORAL at 05:31

## 2025-02-01 RX ADMIN — ACETAMINOPHEN 1000 MG: 500 TABLET ORAL at 00:25

## 2025-02-01 RX ADMIN — SENNOSIDES AND DOCUSATE SODIUM 2 TABLET: 50; 8.6 TABLET ORAL at 06:00

## 2025-02-01 RX ADMIN — AMIODARONE HYDROCHLORIDE 150 MG: 1.5 INJECTION, SOLUTION INTRAVENOUS at 14:48

## 2025-02-01 RX ADMIN — Medication 1 APPLICATOR: at 20:23

## 2025-02-01 RX ADMIN — Medication 1 APPLICATOR: at 08:17

## 2025-02-01 RX ADMIN — AMIODARONE HYDROCHLORIDE 400 MG: 200 TABLET ORAL at 17:28

## 2025-02-01 RX ADMIN — METOPROLOL SUCCINATE 50 MG: 50 TABLET, EXTENDED RELEASE ORAL at 17:27

## 2025-02-01 RX ADMIN — ASPIRIN 81 MG: 81 TABLET, COATED ORAL at 05:30

## 2025-02-01 RX ADMIN — AMLODIPINE BESYLATE 10 MG: 10 TABLET ORAL at 05:31

## 2025-02-01 ASSESSMENT — FIBROSIS 4 INDEX: FIB4 SCORE: 0.75

## 2025-02-01 ASSESSMENT — PAIN DESCRIPTION - PAIN TYPE
TYPE: ACUTE PAIN
TYPE: ACUTE PAIN

## 2025-02-01 NOTE — CARE PLAN
The patient is Stable - Low risk of patient condition declining or worsening    Shift Goals  Clinical Goals: Ambulate, IS, CHG, shower  Patient Goals: sleep  Family Goals: NIKA      Problem: Post Op Day 3 CABG/Heart Valve replacement  Goal: Optimal care of the post op CABG/Heart Valve replacement post op day 3  Outcome: Not Progressing  Intervention: Daily weights in the morning  Note: Daily weights completed each morning at time of AM medication pass. Last weight was 84.1 kg  Intervention: Shower daily and clean incisions twice daily with soap and water  Note: Patient refused shower, stating he was too tired. Incision cleansed once on night shift. Patient educated on incision care and able to demonstrate proper cleaning of incision sites.   Intervention: Up in chair for all meals  Note: Patient was up 2 out of 3 meals.   Intervention: Ambulate 4 times daily, increasing the distance each time  Note: Patient only ambulated around the unit twice. Refused all other attempts. Patient reminded and educated on the importance of mobility.  Intervention: IS q 1 hour while awake and record best IS volume  Note:  Incentive spirometer at bedside and patient educated on proper use, able to achieve 3500.   Intervention: Consider removal of cano, chest tube and pacer wires if not already done  Note: All appliances previously removed.

## 2025-02-01 NOTE — DISCHARGE SUMMARY
DISCHARGE SUMMARY    ADMISSION DATE: 1/28/2025    DISCHARGE DATE: 2/1/25    ADMITTING DIAGNOSES:  Ascending aortic aneurysm (approximately 5.2 to 5.4 cm), mild aortic regurgitation, supraventricular tachycardia, dyslipidemia     DISCHARGE DIAGNOSES:  Ascending aortic aneurysm (approximately 5.2 to 5.4 cm), mild aortic regurgitation, supraventricular tachycardia, dyslipidemia     PROCEDURES PERFORMED:   1/28/25 Dr. Smith  AORTIC ROOT REPLACEMENT (Bio-Bentall 29 mm Konect Corrigan valved conduit, coronary artery reimplantation), ascending aortic aneurysm hemiarch repair (32 mm tube graft), circulatory arrest (25 minutes) and intraoperative transesophageal echocardiography     HISTORY OF PRESENT ILLNESS:    The patient is a 64 y.o. male with past medical history of SVT, former tobacco use x 4-5 years, hyperlipidemia and ascending aortic aneurysm. Today, he states that he has been having increased headaches and fatigue since they changed his metoprolol dose. He denies shortness of breath, chest pain, lower extremity edema, dizziness, syncope, orthopnea, or PND. 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 His surgery originally planned for 11/14/24, but was delayed due to preop utox showing methamphetamine. He has been retested following the delay and repeat testing demonstrates clean urine. There have been no other interval changes to the original consult dated 9/25/24.       HOSPITAL COURSE:   POD 1  HTN, a fib last night- amio d/c'd due to conversion to SB, neuro intact, wounds intact, abdomen soft, fluid balance negative, wt stable,  2 L NC.  Plan:  Keep chest tubes and pacing wires. Add amlodipine. IS/ambulate.     POD 2 HTN requiring PRNs, SR, Room air. Neuro intact, wounds CDI. ABD soft, NT, BM-. Fluid neg, wt below admit, good uop, Cr: 0.69. Chest tubes with 110 out overnight, H/h: 12.9/37.5. Plan: Remove chest tubes and cano. Keep pacer wires. Increase amlodipine. Transfer to tele.  Encourage IS/Ambulation.      POD 3  HDS, SR- SVT overnight requiring metop and adenosine, neuro intact, wounds intact, abdomen soft no BM, fluid balance negative, wt down, room air.  Plan: Stop amio gtt this afternoon, resume home troprol xl, IS/ambulate. Pacing wires removed. Anticipate home 1-2 days.      POD 4  HDS, SR. SVT episode yesterday. Neuro intact, wounds intact, abdomen soft no BM, fluid balance negative, wt down, room air.  Plan:  Escalate bowel protocol. DC home after BM. IS/ambulate.     TELEMETRY:  1/29 a fib overnight, now SB/SR  1/30 SR  1/31 SVT overnight, now SR  2/1 SR       RECENT LABS:     Lab Results   Component Value Date/Time    SODIUM 133 (L) 02/01/2025 12:30 AM    POTASSIUM 4.5 02/01/2025 12:30 AM    CHLORIDE 102 02/01/2025 12:30 AM    CO2 22 02/01/2025 12:30 AM    GLUCOSE 99 02/01/2025 12:30 AM    BUN 18 02/01/2025 12:30 AM    CREATININE 0.83 02/01/2025 12:30 AM    BUNCREATRAT 19.0 10/31/2022 12:44 AM      Lab Results   Component Value Date/Time    WBC 14.9 (H) 02/01/2025 12:30 AM    RBC 4.53 (L) 02/01/2025 12:30 AM    HEMOGLOBIN 12.6 (L) 02/01/2025 12:30 AM    HEMATOCRIT 37.8 (L) 02/01/2025 12:30 AM    MCV 83.4 02/01/2025 12:30 AM    MCH 27.8 02/01/2025 12:30 AM    MCHC 33.3 02/01/2025 12:30 AM    MPV 9.7 02/01/2025 12:30 AM    NEUTSPOLYS 55.10 01/27/2025 11:41 AM    LYMPHOCYTES 34.00 01/27/2025 11:41 AM    MONOCYTES 8.10 01/27/2025 11:41 AM    EOSINOPHILS 1.70 01/27/2025 11:41 AM    BASOPHILS 0.80 01/27/2025 11:41 AM      Lab Results   Component Value Date/Time    PROTHROMBTM 17.6 (H) 01/28/2025 01:39 PM    INR 1.44 (H) 01/28/2025 01:39 PM        Fluids:    Intake/Output Summary (Last 24 hours) at 2/1/2025 0742  Last data filed at 2/1/2025 0052  Gross per 24 hour   Intake 240 ml   Output --   Net 240 ml     Admit weight: Weight: 88.5 kg (195 lb 1.7 oz)  Current weight: Weight: 82.3 kg (181 lb 7 oz) (02/01/25 0521)    ALLERGIES:     Hydrocodone and Oxycodone    EJECTION  FRACTION:  65%    CARDIAC MEDICATIONS:    ASA - Yes    Plavix - No; contraindicated because of Other AVR    Post-operative Beta Blockers - Yes    Ace/ARB- No; contraindicated because of Normal EF    ARNI-  No; contraindicated because of Normal EF    Statin - Yes    Aldactone- No; contraindicated because of Normal EF    SGLT2i-  No; contraindicated because of Normal EF    DISCHARGE MEDICATIONS:      Medication List        START taking these medications        Instructions   amLODIPine 10 MG Tabs  Start taking on: February 2, 2025  Commonly known as: Norvasc   Take 1 Tablet by mouth every day.  Dose: 10 mg     aspirin 81 MG EC tablet  Start taking on: February 2, 2025   Take 1 Tablet by mouth every day.  Dose: 81 mg     traMADol 50 MG Tabs  Commonly known as: Ultram   Take 1 Tablet by mouth every 6 hours as needed for Severe Pain or Moderate Pain for up to 14 days.  Dose: 50 mg            CHANGE how you take these medications        Instructions   acetaminophen 500 MG Tabs  What changed: reasons to take this  Commonly known as: Tylenol   Take 2 Tablets by mouth every 6 hours as needed for Mild Pain or Moderate Pain.  Dose: 1,000 mg            CONTINUE taking these medications        Instructions   metoprolol SR 50 MG Tb24  Commonly known as: Toprol XL   Take 1 Tablet by mouth 2 times a day.  Dose: 50 mg     rosuvastatin 5 MG Tabs  Commonly known as: Crestor   Take 1 Tablet by mouth every evening.  Dose: 5 mg            STOP taking these medications      ibuprofen 200 MG Tabs  Commonly known as: Motrin              NARCOTIC PAIN MEDICATIONS:   In prescribing controlled substances to this patient, I certify that I have obtained and reviewed their medical history. I have also made a good christie effort to obtain applicable records from other providers who have treated the patient .    I have conducted a physical exam and documented it. I have reviewed the patient's prescription history as maintained by the [place]  Prescription Monitoring Program.     I have assessed the patient’s risk for abuse, dependency, and addiction using the validated Opioid Risk Tool.    Given the above, I believe the benefits of controlled substance therapy outweigh the risks. The reasons for prescribing controlled substances include non-narcotic, oral analgesic alternatives have been inadequate for pain control. Accordingly, I have discussed the risk and benefits, treatment plan, and alternative therapies with the patient.     Pt understands this prescription is a controlled substance which is potentially habit-forming and its use is regulated by the [place]. It must be submitted to the pharmacy within 5 days of the date written and can not be called in or faxed to the pharmacy. Refills are subject to terms of a medicine agreement. Any refill requires a new prescription that must be obtained from this office during regular office hours Monday through Thursday 7 am to 4 pm. We ask for 72 hours notice to get an appointment for a narcotic pain medication refill. This medicine can cause nausea, significant constipation, sedation, confusion.     DIET:   Cardiac diet    DISCHARGE INSTRUCTIONS DISCUSSED WITH THE PATIENT:      1. NO driving for 4 weeks after surgery. You may ride as a passenger.  2. NO lifting of any item over 10 lbs (e.g. gallon of milk) for 6 weeks after surgery.  3. DO walk as much as possible! Walk a minimum of once a day. Depending on your fatigue and comfort level, you may walk as much as you wish. There is no maximum.  4. Other physical activities (sex, housework, gardening, etc.) are OK after 4 weeks   5. Continue using incentive spirometer for 2 weeks, especially if going home on oxygen.    Incision Care:  1. SHOWER ONLY - no baths. Clean incision daily with plain Ivory ® soap or any other dye or perfume free soap. Then pat incision dry with clean towel. Avoid creams or lotions on the incision(s).  a. If there is any increase in redness  or swelling, or separation of the incision line, or thick drainage* from any of the incisions, call right away  * Clear, thin drainage is not abnormal especially from the leg incision and/or                         chest tube sites.  2. Continue to wear your JOHN Stockings for 4 weeks. You may take off the stockings when in bed or when the legs are elevated.    Patient instructed to call [facility] cardiac surgery at [phone]  if any increased shortness of breath, uncontrolled pain, weight gain greater than 3 pounds in 1 day or 5 pounds in 1 week, SBP >140, HR <60 or redness swelling or drainage of incisions.      FOLLOW-UP:   Future Appointments   Date Time Provider Department Center   2/24/2025  1:15 PM RON Chris None   2/24/2025  2:00 PM CT RESOURCE PROVIDER [facility] None   2/25/2025  7:15 AM [facility] EXAM 10 ECHO [facility]   2/27/2025  7:40 AM ALEXANDRA Mercado None   3/31/2025  9:00 AM ALEXANDRA Clayton None

## 2025-02-01 NOTE — PROGRESS NOTES
Monitor Summary  Rhythm: SR w BBB  Rate: 64-77  Ectopy: (R) PAC, (O) PAC  Measurements: .19/.15/.45

## 2025-02-01 NOTE — WOUND TEAM
Assisted Mary Wound RN with skin assessment and wound consult evaluation for Back. Additional staff necessary for turning/standing, repositioning of patient, assisting with non-selective debridement for wound bed with wound cleanser and gauze. Please see her notes for details.

## 2025-02-01 NOTE — WOUND TEAM
[facility]  Inpatient Services  Wound and Skin Care Brief Evaluation    Admission Date: 1/28/2025     Last order of IP CONSULT TO WOUND CARE was found on 1/30/2025 from Hospital Encounter on 1/22/2025     HPI, PMH, SH: Reviewed    No chief complaint on file.    Diagnosis: Ascending aortic aneurysm, unspecified whether ruptured (HCC) [I71.21]    Unit where seen by Wound Team: T721/01     Wound consult placed regarding back. Chart and images reviewed. This discussed with bedside RN Nafisa. This clinician in to assess patient. Patient pleasant and agreeable. The back has superficial traumatic wound likely from defibrillator pads. This area is expected to resolve now that pads are no longer in use. Area left open to air. Non-selectively debrided with N/A.     No pressure injuries or advanced wound care needs identified. Wound consult completed. No further follow up unless indicated and consulted.      L heel  R heel      Back  Sacrum       PREVENTATIVE INTERVENTIONS:    Q shift Julio César - performed per nursing policy  Q shift pressure point assessments - performed per nursing policy    Surface/Positioning  Standard/trauma mattress - Currently in Place    Mobilization      Ambulate

## 2025-02-01 NOTE — CONSULTS
Cardiology Consult Note:    Lani Lopez M.D.  Date & Time note created:    2/1/2025   1:30 PM     Referring provider:   / MARILOU/ EUGENIA / MABLE Smith    Patient ID:   Name:             Donovan Vail   YOB: 1960  Age:                 64 y.o.  male   MRN:               [id number]                                                             Chief Complaint / Reason for consult:  SVT post op.    History of Present Illness:    This is a 64 years old man with recent open heart surgery for aortic aneurysm repair with aortic root replacement, now experiencing paroxysmal SVT.  I personally interpreted the EKG tracings which showed atypical atrial flutter.    Patient has been hemodynamically stable.    Review of Systems:      Constitutional: Denies fevers, Denies weight changes  Eyes: Denies changes in vision, no eye pain  Ears/Nose/Throat/Mouth: Denies nasal congestion or sore throat   Cardiovascular: no chest pain, no palpitations   Respiratory: no shortness of breath , Denies cough  Gastrointestinal/Hepatic: Denies abdominal pain, nausea, vomiting, diarrhea, constipation or GI bleeding   Genitourinary: Denies dysuria or frequency  Musculoskeletal/Rheum: Denies  joint pain and swelling   Skin: Denies rash  Neurological: Denies headache, confusion, memory loss or focal weakness/parasthesias  Psychiatric: denies mood disorder   Endocrine: Leticia thyroid problems  Heme/Oncology/Lymph Nodes: Denies enlarged lymph nodes, denies brusing or known bleeding disorder  All other systems were reviewed and are negative (AMA/CMS criteria)                Past Medical History:   Past Medical History:   Diagnosis Date    Arrhythmia 2024    svt    Cataract 01/24/2025    being watched    Disorder of thyroid 09/13/2024    polyps on thyroid    High cholesterol 01/24/2025    medicated    Hyperlipidemia     Hypertension 01/24/2025    medicated    Marijuana use 11/17/2017    Pain 01/24/2025    general body    Ulnar  nerve entrapment, right 10/04/2017     Active Hospital Problems    Diagnosis     Encounter for weaning from ventilator (HCC) [Z99.11]     Amphetamine abuse (HCC) [F15.10]     Coronary artery disease due to lipid rich plaque [I25.10, I25.83]     Aortic insufficiency [I35.1]     Aneurysm of ascending aorta without rupture (HCC) [I71.21]     Prediabetes [R73.03]     Hyperlipidemia [E78.5]        Past Surgical History:  Past Surgical History:   Procedure Laterality Date    WA ECHO TRANSESOPH, FOR MONITORING  1/28/2025    Procedure: ECHOCARDIOGRAM, TRANSESOPHAGEAL, INTRAOPERATIVE;  Surgeon: Yann Smith M.D.;  Location: [facility];  Service: Cardiothoracic    WA REPLACE AORT VALVE W/ALLOGRAFT VALVE  1/28/2025    Procedure: REPLACEMENT, AORTIC VALVE;  Surgeon: Yann Smith M.D.;  Location: [facility];  Service: Cardiothoracic    AORTIC VALVE REPLACEMENT  1/28/2025    Procedure: AORTIC ROOT REPLACEMENT;  Surgeon: Yann Smith M.D.;  Location: [facility];  Service: Cardiothoracic    AORTIC ASCENDING DISSECTION  1/28/2025    Procedure: REPAIR, ANEURYSM , HEMIARCH, CIRCULATORY ARREST;  Surgeon: Yann Smith M.D.;  Location: [facility];  Service: Cardiothoracic    INGUINAL HERNIA LAPAROSCOPIC BILATERAL Bilateral 11/20/2017    Procedure: INGUINAL HERNIA LAPAROSCOPIC BILATERAL;  Surgeon: Pradeep West M.D.;  Location: [facility];  Service: Vascular    NERVE ULNAR TRANSFER Right 03/2017    SPLIT THICKNESS SKIN GRAFT  1/21/2015    Performed by Raza Cowan M.D. at [facility]    OTHER SURGICAL PROCEDURE Right 2014    muscle contracture release    OTHER SURGICAL PROCEDURE Right 2013    necrotizing fascitis    INGUINAL HERNIA REPAIR Right 2005    OTHER SURGICAL PROCEDURE Right 8511-9113    Total of 14 surgeries due to necrotizing fascitis       Hospital Medications:    Current Facility-Administered Medications:     amiodarone (Cordarone) tablet  400 mg, 400 mg, Oral, TID, Lani Lopez M.D.    metoprolol SR (Toprol XL) tablet 50 mg, 50 mg, Oral, BID, NORBERTO LeesP.R.N., 50 mg at 02/01/25 0530    amLODIPine (Norvasc) tablet 10 mg, 10 mg, Oral, Q DAY, Matthew Perry P.A.-C., 10 mg at 02/01/25 0531    rosuvastatin (Crestor) tablet 5 mg, 5 mg, Oral, Q EVENING, NORBERTO LeesP.R.N., 5 mg at 01/31/25 1749    Respiratory Therapy Consult, , Nebulization, Continuous RT, Matthew Perry P.A.-C.    NS infusion, , Intravenous, Continuous, Matthew Perry P.A.-C., Stopped at 01/29/25 0509    NS infusion, , Intravenous, Continuous, Matthew Perry P.A.-C., Stopped at 01/29/25 0413    enoxaparin (Lovenox) inj 40 mg, 40 mg, Subcutaneous, DAILY AT 1800, Matthew Perry P.A.-C., 40 mg at 01/31/25 1749    Nozin nasal  swab, 1 Applicator, Each Nostril, BID, Matthew Perry P.A.-C., 1 Applicator at 02/01/25 0817    calcium CHLORIDE 10 % 1,000 mg in dextrose 5% 100 mL IVPB, 1,000 mg, Intravenous, Once PRN, Matthew Perry P.A.-C.    aspirin EC tablet 81 mg, 81 mg, Oral, DAILY, Matthew Perry P.A.-C., 81 mg at 02/01/25 0530    Pharmacy Consult Request ...Pain Management Review 1 Each, 1 Each, Other, PHARMACY TO DOSE, Matthew Perry P.A.-C.    acetaminophen (Tylenol) tablet 1,000 mg, 1,000 mg, Oral, Q6HRS, 1,000 mg at 02/01/25 1317 **FOLLOWED BY** [START ON 2/7/2025] acetaminophen (Tylenol) tablet 1,000 mg, 1,000 mg, Oral, Q6HRS PRN, Matthew Perry P.A.-C.    oxyCODONE immediate-release (Roxicodone) tablet 5 mg, 5 mg, Oral, Q3HRS PRN **OR** oxyCODONE immediate release (Roxicodone) tablet 10 mg, 10 mg, Oral, Q3HRS PRN **OR** fentaNYL (Sublimaze) injection 50 mcg, 50 mcg, Intravenous, Q3HRS PRN, Matthew Perry P.A.-C.    traMADol (Ultram) 50 MG tablet 50 mg, 50 mg, Oral, Q4HRS PRN, Matthew Perry P.A.-C., 50 mg at 01/29/25 2031    sodium bicarbonate 8.4 % injection 50 mEq, 50 mEq, Intravenous, Q HOUR PRN, Matthew Perry P.A.-C.    ondansetron (Zofran) syringe/vial injection  "8 mg, 8 mg, Intravenous, Q6HRS PRN **OR** prochlorperazine (Compazine) injection 10 mg, 10 mg, Intravenous, Q6HRS PRN, Matthew Perry P.A.-C.    acetaminophen (Tylenol) tablet 650 mg, 650 mg, Oral, Q4HRS PRN **OR** acetaminophen (Tylenol) suppository 650 mg, 650 mg, Rectal, Q4HRS PRN, ALVARO Aparicio.STELLA.    senna-docusate (Pericolace Or Senokot S) 8.6-50 MG per tablet 2 Tablet, 2 Tablet, Oral, BID, 2 Tablet at 02/01/25 0600 **AND** polyethylene glycol/lytes (Miralax) Packet 1 Packet, 1 Packet, Oral, DAILY, 1 Packet at 02/01/25 0532 **AND** magnesium hydroxide (Milk Of Magnesia) suspension 30 mL, 30 mL, Oral, DAILY, 30 mL at 02/01/25 0533 **AND** bisacodyl (Dulcolax) suppository 10 mg, 10 mg, Rectal, QDAY PRN, ALVARO Aparicio.-SHERI.    omeprazole (PriLOSEC) capsule 20 mg, 20 mg, Oral, DAILY, ALVARO Aparicio.-C., 20 mg at 02/01/25 0532    mag hydrox-al hydrox-simeth (Maalox Plus Es Or Mylanta Ds) suspension 30 mL, 30 mL, Oral, Q4HRS PRN, RADHA Aparicio.A.-C.    electrolyte-A (Plasmalyte-A) infusion, , Intravenous, PRN, RADHA Aparicio.ANTHONY.-C.    Current Outpatient Medications:  Medications Prior to Admission   Medication Sig Dispense Refill Last Dose/Taking    metoprolol SR (TOPROL XL) 50 MG TABLET SR 24 HR Take 1 Tablet by mouth 2 times a day. 180 Tablet 3 1/27/2025 at  9:00 PM    rosuvastatin (CRESTOR) 5 MG Tab Take 1 Tablet by mouth every evening. 90 Tablet 3 1/27/2025 at  9:00 PM    [DISCONTINUED] acetaminophen (TYLENOL) 500 MG Tab Take 1,000 mg by mouth every 6 hours as needed.   1/27/2025 Morning    [DISCONTINUED] ibuprofen (MOTRIN) 200 MG Tab Take 400 mg by mouth every 6 hours as needed for Mild Pain.   1/27/2025 Morning       Medication Allergy:  Allergies   Allergen Reactions    Hydrocodone Unspecified     \"It makes me very sick\"  GI issues    Oxycodone Unspecified     \"It makes me very sick\"  GI upset       Family History:  Family History   Problem Relation Age of Onset    Hypertension Mother     Arterial " Aneurysm Father     Heart Disease Father     Heart Attack Neg Hx        Social History:  Social History     Socioeconomic History    Marital status:      Spouse name: Not on file    Number of children: Not on file    Years of education: Not on file    Highest education level: Not on file   Occupational History    Not on file   Tobacco Use    Smoking status: Former     Current packs/day: 0.00     Average packs/day: 1 pack/day for 46.4 years (46.4 ttl pk-yrs)     Types: Cigarettes     Start date: 1974     Quit date: 2021     Years since quittin.0    Smokeless tobacco: Current     Types: Snuff, Chew   Vaping Use    Vaping status: Former   Substance and Sexual Activity    Alcohol use: Yes     Comment: very rarely    Drug use: Yes     Types: Inhaled, Marijuana     Comment: THC, marijuana occasionally    Sexual activity: Not on file   Other Topics Concern    Not on file   Social History Narrative    ** Merged History Encounter **          Social Drivers of Health     Financial Resource Strain: Not on file   Food Insecurity: No Food Insecurity (2025)    Hunger Vital Sign     Worried About Running Out of Food in the Last Year: Never true     Ran Out of Food in the Last Year: Never true   Transportation Needs: No Transportation Needs (2025)    PRAPARE - Transportation     Lack of Transportation (Medical): No     Lack of Transportation (Non-Medical): No   Physical Activity: Not on file   Stress: Not on file   Social Connections: Not on file   Intimate Partner Violence: Not At Risk (2025)    Humiliation, Afraid, Rape, and Kick questionnaire     Fear of Current or Ex-Partner: No     Emotionally Abused: No     Physically Abused: No     Sexually Abused: No   Housing Stability: Low Risk  (2025)    Housing Stability Vital Sign     Unable to Pay for Housing in the Last Year: No     Number of Times Moved in the Last Year: 0     Homeless in the Last Year: No         Physical Exam:  Vitals/  "General Appearance:   Weight/BMI: Body mass index is 23.3 kg/m².  /71   Pulse (!) 178   Temp 36.1 °C (97 °F) (Temporal)   Resp 16   Ht 1.88 m (6' 2\")   Wt 82.3 kg (181 lb 7 oz)   SpO2 96%   Vitals:    25 0521 25 0759 25 1144 25 1311   BP: 112/68 92/69 101/69 105/71   Pulse: 77 80 77 (!) 178   Resp: 17 18 16    Temp: 37.1 °C (98.8 °F) 36.3 °C (97.3 °F) 36.1 °C (97 °F)    TempSrc: Temporal Temporal Temporal    SpO2: 95% 99% 96%    Weight: 82.3 kg (181 lb 7 oz)      Height:         Oxygen Therapy:  Pulse Oximetry: 96 %, O2 (LPM): 0, O2 Delivery Device: None - Room Air    Constitutional:   No acute distress  HENMT:  Normocephalic, Atraumatic.  Eyes:  EOMI, No discharge.  Neck:  no JVD.  Cardiovascular:  Normal heart rate, Normal rhythm.  Extremitites with intact distal pulses, no cyanosis, or edema.  Lungs:  No respiratory distress.  Abdomen: Soft, No tenderness, No guarding, No rebound.  Skin: No significant rash.  Neurologic: Alert & oriented x 3, No focal deficits noted, cranial nerves II through X are intact.  Psychiatric: Affect normal, Judgment normal, Mood normal.      MDM (Data Review):     Records reviewed and summarized in current documentation    Lab Data Review:  Recent Results (from the past 24 hours)   EKG    Collection Time: 25  5:01 PM   Result Value Ref Range    Report       [facility]    Test Date:  2025  Pt Name:    TARYN ELLINGTON                 Department: 171  MRN:        [id number]                      Room:       T721  Gender:     Male                         Technician: SARINIKI  :        1960                   Requested By:ANA JENKINS  Order #:    [id number]                    Reading MD: Pradeep Richards MD    Measurements  Intervals                                Axis  Rate:       163                          P:          0  ND:         0                            QRS:        -5  QRSD:       133                          T:          " 28  QT:         331  QTc:        546    Interpretive Statements  Supraventricular tachycardia - consider atrial flutter  Right bundle branch block  Electronically Signed On 2025 17:01:42 PST by Pradeep Richards MD     EKG    Collection Time: 25  5:02 PM   Result Value Ref Range    Report       [facility]    Test Date:  2025  Pt Name:    [facility]                 Department: 171  MRN:        [id number]                      Room:       T721  Gender:     Male                         Technician: [facility]  :        1960                   Requested By:MAAME OLIVA  Order #:    [id number]                    Reading MD: Pradeep Richards MD    Measurements  Intervals                                Axis  Rate:       85                           P:          43  OK:         231                          QRS:        54  QRSD:       142                          T:          26  QT:         370  QTc:        440    Interpretive Statements  SUPRAVENTRICULAR TACHYCARDIA then Sinus rhythm  Prolonged OK interval  Right bundle branch block  Electronically Signed On 2025 17:02:04 PST by Pradeep Richards MD     EKG    Collection Time: 25  5:02 PM   Result Value Ref Range    Report       [facility]    Test Date:  2025  Pt Name:    [facility]                 Department: 171  MRN:        [id number]                      Room:       T721  Gender:     Male                         Technician: [facility]  :        1960                   Requested By:MAAME OLIVA  Order #:    [id number]                    Reading MD: Pradeep Richards MD    Measurements  Intervals                                Axis  Rate:       147                          P:          0  OK:         0                            QRS:        -34  QRSD:       144                          T:          25  QT:         348  QTc:        545    Interpretive Statements  SUPRAVENTRICULAR TACHYCARDIA- probable atrial flutter  Right bundle  branch block  Electronically Signed On 01- 17:02:17 PST by Pradeep Richards MD     CBC without Differential Critical Care 0130    Collection Time: 02/01/25 12:30 AM   Result Value Ref Range    WBC 14.9 (H) 4.8 - 10.8 K/uL    RBC 4.53 (L) 4.70 - 6.10 M/uL    Hemoglobin 12.6 (L) 14.0 - 18.0 g/dL    Hematocrit 37.8 (L) 42.0 - 52.0 %    MCV 83.4 81.4 - 97.8 fL    MCH 27.8 27.0 - 33.0 pg    MCHC 33.3 32.3 - 36.5 g/dL    RDW 47.0 35.9 - 50.0 fL    Platelet Count 199 164 - 446 K/uL    MPV 9.7 9.0 - 12.9 fL   Basic Metabolic Panel (BMP) Critical Care 0130    Collection Time: 02/01/25 12:30 AM   Result Value Ref Range    Sodium 133 (L) 135 - 145 mmol/L    Potassium 4.5 3.6 - 5.5 mmol/L    Chloride 102 96 - 112 mmol/L    Co2 22 20 - 33 mmol/L    Glucose 99 65 - 99 mg/dL    Bun 18 8 - 22 mg/dL    Creatinine 0.83 0.50 - 1.40 mg/dL    Calcium 8.6 8.5 - 10.5 mg/dL    Anion Gap 9.0 7.0 - 16.0   ESTIMATED GFR    Collection Time: 02/01/25 12:30 AM   Result Value Ref Range    GFR (CKD-EPI) 99 >60 mL/min/1.73 m 2       Imaging/Procedures Review:    Chest Xray:  Reviewed    EKG:   As in HPI documentation.    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  I do think that amiodarone is the best option for rhythm management and maintenance.  Will increase amiodarone to 400 mg p.o. 3 times a day.  Once discharged patient should be titrated down via protocol.  Short-term anticoagulation is recommended for at least 4 weeks if able and if no risk of bleeding postoperatively.      Lani Lopez MD.  FACC.  Cardiology Inpatient Service.  [facility].  [phone].  Rosalba Ohara.

## 2025-02-01 NOTE — DISCHARGE INSTRUCTIONS
DIVISION OF CARDIAC SURGERY   DISCHARGE INSTRUCTIONS    Activity:    NO driving for 4 weeks after surgery. You may ride as a passenger.  NO lifting, pushing, or pulling more than 10 pounds for 6 weeks.  For the next 6 weeks, keep your elbows close to your body and move within a pain-free motion when lifting, pushing or pulling.  Do not stretch both arms backwards at the same time.    Walk at least 4 times per day, there is no maximum. The goal is to increase your distance over time.  Continue using incentive spirometer for 2 weeks or until your baseline volume is reached.  If you are going home on oxygen and you were not on oxygen prior to surgery, keep using until you are oxygen free.  Weigh yourself daily.  Call your Cardiologist for a weight gain of 3 or more pounds in 1 day or more than 5 pounds in 7 days.  Take all of your medications as prescribed. Do not use a pill box for the first month at home. If you have questions, please call your nurse navigator at [phone].  Continue to wear the JOHN (compression) stockings for 2-4 weeks or until all swelling is gone. You may take them off when you are in bed or when your legs are elevated.    Incision Care:    Make sure to clean your incision(s) TWICE DAILY.  Once by showering AND once using the no rinse Foam cleanser provided in the hospital.  During the shower, cleanse the incision(s) with a perfume and dye free soap (Dial, Dove, [place])  Use gentle pressure and rub up and down over incision with your hands or a washcloth. Rinse off and pat incision(s) dry with clean towel.  Keep the incision open to air. No creams or lotions on your incision(s). No baths.  If there is any increased redness or swelling, separation of the incision line, or thick drainage from any of your incisions, call the Cardiac Surgeons ([phone]).      General Instructions:    You have been referred to Cardiac Rehab.  You can start Cardiac Rehab 30 days after surgery.  If you do  not have an appointment at the time of discharge call [phone] to schedule an appointment.  Your Primary Care Doctor typically handles home oxygen. Oxygen may be stopped when your oxygen level is consistently greater than 90.  Check with your Primary Care Doctor if you are unsure.  Take all of your medications (including pain medications) as prescribed.  Taking medications other than prescribed can result in serious injury.    For Patients Discharged with Narcotic Pain Medication:     If a refill is needed, understand that only 1 refill will be provided and you must come to the Cardiac Surgeons’ office for an appointment (72 hours’ notice is required to schedule and there are no weekend appointments).  If the pain medications you are discharged on are not working, you will need to bring your remaining prescription into the office in order to receive a new prescription.  If you were taking narcotics prior to your heart surgery, the Cardiac Surgeons will provide you with one prescription and additional medications will need to be provided by your pain management doctor.  Do not drink alcohol while taking narcotics.  Lost or stolen medications will not be refilled.  If medications are stolen, report to law enforcement.    Contact Cardiac Surgery at [phone] if you have any questions.

## 2025-02-01 NOTE — CARE PLAN
The patient is Stable - Low risk of patient condition declining or worsening    Shift Goals  Clinical Goals: shower, ambulate, IS  Patient Goals: rest, sleep  Family Goals: updates    Progress made toward(s) clinical / shift goals:      Problem: Post Op Day 3 CABG/Heart Valve replacement  Goal: Optimal care of the post op CABG/Heart Valve replacement post op day 3  Outcome: Not Progressing  Intervention: Daily weights in the morning  Note: Patients weight was 84.1kg this morning   Intervention: Shower daily and clean incisions twice daily with soap and water  Note: Patient refusing to shower. Stating he is to tired. Education provided on importance of incision care   Intervention: Up in chair for all meals  Note: Patient has gotten edge of bed for 2 of 3 meals so far   Intervention: Ambulate 4 times daily, increasing the distance each time  Note: Patient only wanted to ambulate once around the unit today. Refused all attempts of ambulating again   Intervention: IS q 1 hour while awake and record best IS volume  Note: Patient currently reaching 3500 on IS  Intervention: Consider removal of cano, chest tube and pacer wires if not already done  Note: Pacer wires were removed today        Patient is not progressing towards the following goals:      Problem: Post Op Day 3 CABG/Heart Valve replacement  Goal: Optimal care of the post op CABG/Heart Valve replacement post op day 3  Outcome: Not Progressing

## 2025-02-02 ENCOUNTER — PHARMACY VISIT (OUTPATIENT)
Dept: PHARMACY | Facility: MEDICAL CENTER | Age: 65
End: 2025-02-02
Payer: COMMERCIAL

## 2025-02-02 VITALS
HEART RATE: 79 BPM | RESPIRATION RATE: 18 BRPM | SYSTOLIC BLOOD PRESSURE: 104 MMHG | HEIGHT: 74 IN | DIASTOLIC BLOOD PRESSURE: 61 MMHG | BODY MASS INDEX: 23.2 KG/M2 | TEMPERATURE: 97.3 F | WEIGHT: 180.78 LBS | OXYGEN SATURATION: 92 %

## 2025-02-02 LAB
ANION GAP SERPL CALC-SCNC: 10 MMOL/L (ref 7–16)
BUN SERPL-MCNC: 22 MG/DL (ref 8–22)
CALCIUM SERPL-MCNC: 8 MG/DL (ref 8.5–10.5)
CHLORIDE SERPL-SCNC: 99 MMOL/L (ref 96–112)
CO2 SERPL-SCNC: 21 MMOL/L (ref 20–33)
CREAT SERPL-MCNC: 0.9 MG/DL (ref 0.5–1.4)
ERYTHROCYTE [DISTWIDTH] IN BLOOD BY AUTOMATED COUNT: 45.3 FL (ref 35.9–50)
GFR SERPLBLD CREATININE-BSD FMLA CKD-EPI: 96 ML/MIN/1.73 M 2
GLUCOSE SERPL-MCNC: 110 MG/DL (ref 65–99)
HCT VFR BLD AUTO: 35.1 % (ref 42–52)
HGB BLD-MCNC: 12 G/DL (ref 14–18)
MCH RBC QN AUTO: 27.8 PG (ref 27–33)
MCHC RBC AUTO-ENTMCNC: 34.2 G/DL (ref 32.3–36.5)
MCV RBC AUTO: 81.3 FL (ref 81.4–97.8)
PLATELET # BLD AUTO: 267 K/UL (ref 164–446)
PMV BLD AUTO: 9.2 FL (ref 9–12.9)
POTASSIUM SERPL-SCNC: 4 MMOL/L (ref 3.6–5.5)
RBC # BLD AUTO: 4.32 M/UL (ref 4.7–6.1)
SODIUM SERPL-SCNC: 130 MMOL/L (ref 135–145)
WBC # BLD AUTO: 13.8 K/UL (ref 4.8–10.8)

## 2025-02-02 PROCEDURE — A9270 NON-COVERED ITEM OR SERVICE: HCPCS

## 2025-02-02 PROCEDURE — 80048 BASIC METABOLIC PNL TOTAL CA: CPT

## 2025-02-02 PROCEDURE — RXMED WILLOW AMBULATORY MEDICATION CHARGE: Performed by: NURSE PRACTITIONER

## 2025-02-02 PROCEDURE — A9270 NON-COVERED ITEM OR SERVICE: HCPCS | Performed by: NURSE PRACTITIONER

## 2025-02-02 PROCEDURE — 700102 HCHG RX REV CODE 250 W/ 637 OVERRIDE(OP): Performed by: INTERNAL MEDICINE

## 2025-02-02 PROCEDURE — A9270 NON-COVERED ITEM OR SERVICE: HCPCS | Performed by: INTERNAL MEDICINE

## 2025-02-02 PROCEDURE — 700102 HCHG RX REV CODE 250 W/ 637 OVERRIDE(OP): Performed by: NURSE PRACTITIONER

## 2025-02-02 PROCEDURE — 99024 POSTOP FOLLOW-UP VISIT: CPT | Performed by: NURSE PRACTITIONER

## 2025-02-02 PROCEDURE — 700102 HCHG RX REV CODE 250 W/ 637 OVERRIDE(OP)

## 2025-02-02 PROCEDURE — 85027 COMPLETE CBC AUTOMATED: CPT

## 2025-02-02 RX ORDER — AMIODARONE HYDROCHLORIDE 200 MG/1
200 TABLET ORAL DAILY
Qty: 56 TABLET | Refills: 2 | Status: SHIPPED | OUTPATIENT
Start: 2025-02-02 | End: 2025-02-11

## 2025-02-02 RX ADMIN — SENNOSIDES AND DOCUSATE SODIUM 2 TABLET: 50; 8.6 TABLET ORAL at 04:44

## 2025-02-02 RX ADMIN — ASPIRIN 81 MG: 81 TABLET, COATED ORAL at 04:41

## 2025-02-02 RX ADMIN — APIXABAN 5 MG: 5 TABLET, FILM COATED ORAL at 04:42

## 2025-02-02 RX ADMIN — ACETAMINOPHEN 1000 MG: 500 TABLET ORAL at 00:43

## 2025-02-02 RX ADMIN — AMIODARONE HYDROCHLORIDE 400 MG: 200 TABLET ORAL at 04:41

## 2025-02-02 RX ADMIN — OMEPRAZOLE 20 MG: 20 CAPSULE, DELAYED RELEASE ORAL at 04:45

## 2025-02-02 RX ADMIN — POLYETHYLENE GLYCOL 3350 1 PACKET: 17 POWDER, FOR SOLUTION ORAL at 04:40

## 2025-02-02 RX ADMIN — ACETAMINOPHEN 1000 MG: 500 TABLET ORAL at 04:42

## 2025-02-02 RX ADMIN — MAGNESIUM HYDROXIDE 30 ML: 2400 SUSPENSION ORAL at 04:47

## 2025-02-02 ASSESSMENT — FIBROSIS 4 INDEX: FIB4 SCORE: 0.56

## 2025-02-02 NOTE — PROGRESS NOTES
Patient went back into SVT with a heart rate in the 180's post shower, IV metoprolol was given and patient converted back into SR with a heart rate in the 80's five minutes after the medication was given. CT EUGENIA Perry notified about repeat SVT event. New orders received.

## 2025-02-02 NOTE — PROGRESS NOTES
Monitor check called, patient in SVT with a heart rate up to the 170's. Patient currently getting up to shower at the time of event. IV metoprolol pulled to give but patient self converted back to SR in the 80's before medication could be given. CT EUGENIA Perry notified. New orders received.

## 2025-02-02 NOTE — CARE PLAN
The patient is Stable - Low risk of patient condition declining or worsening    Shift Goals  Clinical Goals: POD 4, bowel movement, walks, CHG  Patient Goals: discharge  Family Goals: NIKA    Progress made toward(s) clinical / shift goals:    Problem: Post Op Day 4 CABG/Heart Valve Replacement  Goal: Optimal care of the Post Op CABG/Heart Valve replacement Post Op Day 4  Outcome: Progressing  Intervention: Daily weights in the morning  Note: Patients weight 82.3 kg  Intervention: Shower daily and clean incisions twice daily with soap and water  Note: Patient showered and completed incision care.  Patient educated on incision care and able to demonstrate proper cleaning of incision sites.   Intervention: Up in chair for all meals  Note: Patient sat edge of bed for meals.   Intervention: Ambulate 4 times daily, increasing the distance each time  Note: Patient only ambulated twice, tolerated well and able to increase distance.   Intervention: IS q 1 hour while awake and record best IS volume  Note:  Incentive spirometer at bedside and patient educated on proper use, able to achieve 3500.   Intervention: Consider removal of cano, chest tube and pacer wires if not already done  Note: All appliances previously removed       Patient is not progressing towards the following goals:

## 2025-02-02 NOTE — DISCHARGE SUMMARY
DISCHARGE SUMMARY    ADMISSION DATE: 1/28/2025    DISCHARGE DATE: 2/2/25    ADMITTING DIAGNOSES:  Ascending aortic aneurysm (approximately 5.2 to 5.4 cm), mild aortic regurgitation, supraventricular tachycardia, dyslipidemia     DISCHARGE DIAGNOSES:  Ascending aortic aneurysm (approximately 5.2 to 5.4 cm), mild aortic regurgitation, supraventricular tachycardia, dyslipidemia     PROCEDURES PERFORMED:   1/28/25 Dr. Smith  AORTIC ROOT REPLACEMENT (Bio-Bentall 29 mm Konect Corrigan valved conduit, coronary artery reimplantation), ascending aortic aneurysm hemiarch repair (32 mm tube graft), circulatory arrest (25 minutes) and intraoperative transesophageal echocardiography     HISTORY OF PRESENT ILLNESS:    The patient is a 64 y.o. male with past medical history of SVT, former tobacco use x 4-5 years, hyperlipidemia and ascending aortic aneurysm. Today, he states that he has been having increased headaches and fatigue since they changed his metoprolol dose. He denies shortness of breath, chest pain, lower extremity edema, dizziness, syncope, orthopnea, or PND. 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 His surgery originally planned for 11/14/24, but was delayed due to preop utox showing methamphetamine. He has been retested following the delay and repeat testing demonstrates clean urine. There have been no other interval changes to the original consult dated 9/25/24.       HOSPITAL COURSE:   POD 1  HTN, a fib last night- amio d/c'd due to conversion to SB, neuro intact, wounds intact, abdomen soft, fluid balance negative, wt stable,  2 L NC.  Plan:  Keep chest tubes and pacing wires. Add amlodipine. IS/ambulate.     POD 2 HTN requiring PRNs, SR, Room air. Neuro intact, wounds CDI. ABD soft, NT, BM-. Fluid neg, wt below admit, good uop, Cr: 0.69. Chest tubes with 110 out overnight, H/h: 12.9/37.5. Plan: Remove chest tubes and cano. Keep pacer wires. Increase amlodipine. Transfer to tele.  Encourage IS/Ambulation.      POD 3  HDS, SR- SVT overnight requiring metop and adenosine, neuro intact, wounds intact, abdomen soft no BM, fluid balance negative, wt down, room air.  Plan: Stop amio gtt this afternoon, resume home troprol xl, IS/ambulate. Pacing wires removed. Anticipate home 1-2 days.      POD 4  HDS, SR. SVT episode yesterday. Neuro intact, wounds intact, abdomen soft no BM, fluid balance negative, wt down, room air.  Plan:  Escalate bowel protocol. Cards consult for recurrent tachyarrhythmias. Amio and eliquis started. IS/ambulate.     POD 5  HDS, SR, neuro intact, wounds intact, abdomen soft +BM, fluid balance negative, wt stable.  Plan:  Discharge home today.  Patient to follow up in clinic in one month.      TELEMETRY:  1/29 a fib overnight, now SB/SR  1/30 SR  1/31 SVT overnight, now SR  2/1 SR, a fib  2/2 SR    RECENT LABS:     Lab Results   Component Value Date/Time    SODIUM 130 (L) 02/02/2025 12:40 AM    POTASSIUM 4.0 02/02/2025 12:40 AM    CHLORIDE 99 02/02/2025 12:40 AM    CO2 21 02/02/2025 12:40 AM    GLUCOSE 110 (H) 02/02/2025 12:40 AM    BUN 22 02/02/2025 12:40 AM    CREATININE 0.90 02/02/2025 12:40 AM    BUNCREATRAT 19.0 10/31/2022 12:44 AM      Lab Results   Component Value Date/Time    WBC 13.8 (H) 02/02/2025 12:40 AM    RBC 4.32 (L) 02/02/2025 12:40 AM    HEMOGLOBIN 12.0 (L) 02/02/2025 12:40 AM    HEMATOCRIT 35.1 (L) 02/02/2025 12:40 AM    MCV 81.3 (L) 02/02/2025 12:40 AM    MCH 27.8 02/02/2025 12:40 AM    MCHC 34.2 02/02/2025 12:40 AM    MPV 9.2 02/02/2025 12:40 AM    NEUTSPOLYS 55.10 01/27/2025 11:41 AM    LYMPHOCYTES 34.00 01/27/2025 11:41 AM    MONOCYTES 8.10 01/27/2025 11:41 AM    EOSINOPHILS 1.70 01/27/2025 11:41 AM    BASOPHILS 0.80 01/27/2025 11:41 AM      Lab Results   Component Value Date/Time    PROTHROMBTM 17.6 (H) 01/28/2025 01:39 PM    INR 1.44 (H) 01/28/2025 01:39 PM        Fluids:    Intake/Output Summary (Last 24 hours) at 2/2/2025 0820  Last data filed at  2/1/2025 1937  Gross per 24 hour   Intake 120 ml   Output --   Net 120 ml     Admit weight: Weight: 88.5 kg (195 lb 1.7 oz)  Current weight: Weight: 82 kg (180 lb 12.4 oz) (02/02/25 0583)    ALLERGIES:     Hydrocodone and Oxycodone    EJECTION FRACTION:  65%    CARDIAC MEDICATIONS:    ASA - Yes    Plavix - No; contraindicated because of On Coumadin / NOAC and Other AVR    Post-operative Beta Blockers - Yes    Ace/ARB- No; contraindicated because of Normal EF    ARNI-  No; contraindicated because of Normal EF    Statin - Yes    Aldactone- No; contraindicated because of Normal EF    SGLT2i-  No; contraindicated because of Normal EF    DISCHARGE MEDICATIONS:      Medication List        START taking these medications        Instructions   amiodarone 200 MG Tabs  Commonly known as: Cordarone   Doctor's comments: Take 2 Tablets BID x 7 days, Then Take Two Tablets daily for 7 days, Then Take 1 Tablet Daily.  Take 1 Tablet by mouth every day.  Dose: 200 mg     amLODIPine 10 MG Tabs  Commonly known as: Norvasc   Take 1 Tablet by mouth every day.  Dose: 10 mg     apixaban 5mg Tabs  Commonly known as: Eliquis   Take 1 Tablet by mouth 2 times a day. Indications: Thromboembolism secondary to Atrial Fibrillation  Dose: 5 mg     aspirin 81 MG EC tablet   Take 1 Tablet by mouth every day.  Dose: 81 mg     traMADol 50 MG Tabs  Commonly known as: Ultram   Take 1 Tablet by mouth every 6 hours as needed for Severe Pain or Moderate Pain for up to 14 days.  Dose: 50 mg            CHANGE how you take these medications        Instructions   acetaminophen 500 MG Tabs  What changed: reasons to take this  Commonly known as: Tylenol   Take 2 Tablets by mouth every 6 hours as needed for Mild Pain or Moderate Pain.  Dose: 1,000 mg            CONTINUE taking these medications        Instructions   metoprolol SR 50 MG Tb24  Commonly known as: Toprol XL   Take 1 Tablet by mouth 2 times a day.  Dose: 50 mg     rosuvastatin 5 MG Tabs  Commonly known  as: Crestor   Take 1 Tablet by mouth every evening.  Dose: 5 mg            STOP taking these medications      ibuprofen 200 MG Tabs  Commonly known as: Motrin              NARCOTIC PAIN MEDICATIONS:   In prescribing controlled substances to this patient, I certify that I have obtained and reviewed their medical history. I have also made a good christie effort to obtain applicable records from other providers who have treated the patient .    I have conducted a physical exam and documented it. I have reviewed the patient's prescription history as maintained by the [place] Prescription Monitoring Program.     I have assessed the patient’s risk for abuse, dependency, and addiction using the validated Opioid Risk Tool.    Given the above, I believe the benefits of controlled substance therapy outweigh the risks. The reasons for prescribing controlled substances include non-narcotic, oral analgesic alternatives have been inadequate for pain control. Accordingly, I have discussed the risk and benefits, treatment plan, and alternative therapies with the patient.     Pt understands this prescription is a controlled substance which is potentially habit-forming and its use is regulated by the [place]. It must be submitted to the pharmacy within 5 days of the date written and can not be called in or faxed to the pharmacy. Refills are subject to terms of a medicine agreement. Any refill requires a new prescription that must be obtained from this office during regular office hours Monday through Thursday 7 am to 4 pm. We ask for 72 hours notice to get an appointment for a narcotic pain medication refill. This medicine can cause nausea, significant constipation, sedation, confusion.     DIET:   Cardiac diet    DISCHARGE INSTRUCTIONS DISCUSSED WITH THE PATIENT:      1. NO driving for 4 weeks after surgery. You may ride as a passenger.  2. NO lifting of any item over 10 lbs (e.g. gallon of milk) for 6 weeks after surgery.  3. DO walk as  much as possible! Walk a minimum of once a day. Depending on your fatigue and comfort level, you may walk as much as you wish. There is no maximum.  4. Other physical activities (sex, housework, gardening, etc.) are OK after 4 weeks   5. Continue using incentive spirometer for 2 weeks, especially if going home on oxygen.    Incision Care:  1. SHOWER ONLY - no baths. Clean incision daily with plain Ivory ® soap or any other dye or perfume free soap. Then pat incision dry with clean towel. Avoid creams or lotions on the incision(s).  a. If there is any increase in redness or swelling, or separation of the incision line, or thick drainage* from any of the incisions, call right away  * Clear, thin drainage is not abnormal especially from the leg incision and/or                         chest tube sites.  2. Continue to wear your JOHN Stockings for 4 weeks. You may take off the stockings when in bed or when the legs are elevated.    Patient instructed to call [facility] at [phone]  if any increased shortness of breath, uncontrolled pain, weight gain greater than 3 pounds in 1 day or 5 pounds in 1 week, SBP >140, HR <60 or redness swelling or drainage of incisions.      FOLLOW-UP:   Future Appointments   Date Time Provider Department Center   2/24/2025  1:15 PM [facility] Chris None   2/24/2025  2:00 PM CT RESOURCE PROVIDER [facility] None   2/25/2025  7:15 AM [facility] EXAM 10 ECHO [facility]   2/27/2025  7:40 AM ALEXANDRA Mercado None   3/31/2025  9:00 AM ALEXANDRA Clayton None

## 2025-02-02 NOTE — PROGRESS NOTES
Monitor Summary  Rhythm: SR  Rate: 70-86  Ectopy: (O) PAC, (F) PAC, (R) PVC  Measurements: .20/.13/.45

## 2025-02-02 NOTE — CARE PLAN
The patient is Stable - Low risk of patient condition declining or worsening    Shift Goals  Clinical Goals: POD 4, Discharge  Patient Goals: Comofrt, Discharge  Family Goals: NIKA    Progress made toward(s) clinical / shift goals:      Problem: Post Op Day 4 CABG/Heart Valve Replacement  Goal: Optimal care of the Post Op CABG/Heart Valve replacement Post Op Day 4  Outcome: Progressing  Intervention: Daily weights in the morning  Note: Patients weight this morning was 82.3kg   Intervention: Shower daily and clean incisions twice daily with soap and water  Note: Patient showered today and performed incision care twice today  Intervention: Up in chair for all meals  Note: Patient sitting edge of bed for all meals  Intervention: Ambulate 4 times daily, increasing the distance each time  Note: Patient only ambulated once today during day shift.   Intervention: IS q 1 hour while awake and record best IS volume  Note: Patient is still getting up to 3500 on the IS  Intervention: Consider removal of cano, chest tube and pacer wires if not already done  Note: Have already been removed       Patient is not progressing towards the following goals:

## 2025-02-02 NOTE — PROGRESS NOTES
Discharge instructions give to patient at bedside. Pt verbalizes understanding and states plans for follow up. New and home medications reviewed, post discharge activity level and worsening of symptoms needing follow up care discussed. Meds to beds given to patient. Telemetry monitor and central line removed. Monitor room notified. All belongings accounted for, all questions answered at this time. Pt escorted with patient's friends by this RN.

## 2025-02-04 ENCOUNTER — DOCUMENTATION (OUTPATIENT)
Dept: VASCULAR LAB | Facility: MEDICAL CENTER | Age: 65
End: 2025-02-04
Payer: MEDICAID

## 2025-02-05 ENCOUNTER — TELEPHONE (OUTPATIENT)
Dept: CARDIOTHORACIC SURGERY | Facility: MEDICAL CENTER | Age: 65
End: 2025-02-05
Payer: MEDICAID

## 2025-02-05 NOTE — TELEPHONE ENCOUNTER
Hospital follow up call    he states that the post op pain is well controlled.  Tramadol is being utilized. Tylenol is being utilized.    he is  showering everyday or every other day.    he states that all incisions are healing well and approximated with no s/s of infection (redness, puss, fever, purulent drainage, or tenderness).    he is NOT using the IS    he is walking everyday, distance is increased from the hospital.    he has had a bowel movement since discharge.    he is obtaining not daily weights. he is not wearing the compression/JOHN hose. he declines LE edema.    he is taking all prescribed meds as directed.  he has no medication questions.    he is not taking vitals (HR and BP)     Patient encouraged to take daily weights, blood pressure, and heart rate. Patient encouraged to use IS.    he has no additional questions at this time.  Follow up appointments were reviewed and I ensured they have my number if issues or concerns arise.      Follow up call time was 10 minutes.

## 2025-02-05 NOTE — PROGRESS NOTES
Left message for patient to call back and schedule new patient appt with Dr. Bloch. Next available ok at either location.         Martha Moya, Medical Assistant   [facility]   Ph: [phone]  Fx: [phone]

## 2025-02-06 ENCOUNTER — OFFICE VISIT (OUTPATIENT)
Dept: CARDIOLOGY | Facility: MEDICAL CENTER | Age: 65
End: 2025-02-06
Attending: INTERNAL MEDICINE
Payer: MEDICAID

## 2025-02-06 ENCOUNTER — TELEPHONE (OUTPATIENT)
Dept: HEALTH INFORMATION MANAGEMENT | Facility: OTHER | Age: 65
End: 2025-02-06

## 2025-02-06 ENCOUNTER — TELEPHONE (OUTPATIENT)
Dept: CARDIOLOGY | Facility: MEDICAL CENTER | Age: 65
End: 2025-02-06
Payer: MEDICAID

## 2025-02-06 ENCOUNTER — HOSPITAL ENCOUNTER (OUTPATIENT)
Dept: RADIOLOGY | Facility: MEDICAL CENTER | Age: 65
End: 2025-02-06
Attending: PHYSICIAN ASSISTANT
Payer: MEDICAID

## 2025-02-06 ENCOUNTER — HOSPITAL ENCOUNTER (OUTPATIENT)
Dept: LAB | Facility: MEDICAL CENTER | Age: 65
End: 2025-02-06
Attending: PHYSICIAN ASSISTANT
Payer: MEDICAID

## 2025-02-06 VITALS
WEIGHT: 183 LBS | OXYGEN SATURATION: 97 % | HEART RATE: 86 BPM | SYSTOLIC BLOOD PRESSURE: 114 MMHG | BODY MASS INDEX: 23.49 KG/M2 | HEIGHT: 74 IN | DIASTOLIC BLOOD PRESSURE: 66 MMHG | RESPIRATION RATE: 16 BRPM

## 2025-02-06 DIAGNOSIS — I47.10 SVT (SUPRAVENTRICULAR TACHYCARDIA) (HCC): ICD-10-CM

## 2025-02-06 DIAGNOSIS — I71.21 ANEURYSM OF ASCENDING AORTA WITHOUT RUPTURE (HCC): ICD-10-CM

## 2025-02-06 DIAGNOSIS — Z86.79 S/P AORTIC ANEURYSM REPAIR: ICD-10-CM

## 2025-02-06 DIAGNOSIS — Z98.890 S/P AORTIC ANEURYSM REPAIR: ICD-10-CM

## 2025-02-06 LAB
ALBUMIN SERPL BCP-MCNC: 3.6 G/DL (ref 3.2–4.9)
ALBUMIN/GLOB SERPL: 1 G/DL
ALP SERPL-CCNC: 221 U/L (ref 30–99)
ALT SERPL-CCNC: 59 U/L (ref 2–50)
ANION GAP SERPL CALC-SCNC: 13 MMOL/L (ref 7–16)
AST SERPL-CCNC: 29 U/L (ref 12–45)
BILIRUB SERPL-MCNC: 0.6 MG/DL (ref 0.1–1.5)
BUN SERPL-MCNC: 23 MG/DL (ref 8–22)
CALCIUM ALBUM COR SERPL-MCNC: 9.6 MG/DL (ref 8.5–10.5)
CALCIUM SERPL-MCNC: 9.3 MG/DL (ref 8.5–10.5)
CHLORIDE SERPL-SCNC: 102 MMOL/L (ref 96–112)
CO2 SERPL-SCNC: 21 MMOL/L (ref 20–33)
CREAT SERPL-MCNC: 1.06 MG/DL (ref 0.5–1.4)
EKG IMPRESSION: NORMAL
ERYTHROCYTE [DISTWIDTH] IN BLOOD BY AUTOMATED COUNT: 46 FL (ref 35.9–50)
GFR SERPLBLD CREATININE-BSD FMLA CKD-EPI: 79 ML/MIN/1.73 M 2
GLOBULIN SER CALC-MCNC: 3.5 G/DL (ref 1.9–3.5)
GLUCOSE SERPL-MCNC: 121 MG/DL (ref 65–99)
HCT VFR BLD AUTO: 34.9 % (ref 42–52)
HGB BLD-MCNC: 12.9 G/DL (ref 14–18)
LV EJECT FRACT  99904: 55
MCH RBC QN AUTO: 30.6 PG (ref 27–33)
MCHC RBC AUTO-ENTMCNC: 37 G/DL (ref 32.3–36.5)
MCV RBC AUTO: 82.9 FL (ref 81.4–97.8)
PLATELET # BLD AUTO: 561 K/UL (ref 164–446)
PMV BLD AUTO: 8.7 FL (ref 9–12.9)
POTASSIUM SERPL-SCNC: 4.4 MMOL/L (ref 3.6–5.5)
PROT SERPL-MCNC: 7.1 G/DL (ref 6–8.2)
RBC # BLD AUTO: 4.21 M/UL (ref 4.7–6.1)
SODIUM SERPL-SCNC: 136 MMOL/L (ref 135–145)
WBC # BLD AUTO: 13.7 K/UL (ref 4.8–10.8)

## 2025-02-06 PROCEDURE — 80053 COMPREHEN METABOLIC PANEL: CPT

## 2025-02-06 PROCEDURE — 99214 OFFICE O/P EST MOD 30 MIN: CPT | Performed by: PHYSICIAN ASSISTANT

## 2025-02-06 PROCEDURE — 3074F SYST BP LT 130 MM HG: CPT | Performed by: PHYSICIAN ASSISTANT

## 2025-02-06 PROCEDURE — 99212 OFFICE O/P EST SF 10 MIN: CPT | Performed by: PHYSICIAN ASSISTANT

## 2025-02-06 PROCEDURE — 71046 X-RAY EXAM CHEST 2 VIEWS: CPT

## 2025-02-06 PROCEDURE — 3078F DIAST BP <80 MM HG: CPT | Performed by: PHYSICIAN ASSISTANT

## 2025-02-06 PROCEDURE — 36415 COLL VENOUS BLD VENIPUNCTURE: CPT

## 2025-02-06 PROCEDURE — 93010 ELECTROCARDIOGRAM REPORT: CPT | Performed by: INTERNAL MEDICINE

## 2025-02-06 PROCEDURE — 93005 ELECTROCARDIOGRAM TRACING: CPT | Mod: TC | Performed by: PHYSICIAN ASSISTANT

## 2025-02-06 PROCEDURE — 85027 COMPLETE CBC AUTOMATED: CPT

## 2025-02-06 ASSESSMENT — ENCOUNTER SYMPTOMS
DIZZINESS: 0
FEVER: 0
DIARRHEA: 0
COUGH: 0
SORE THROAT: 1
WEIGHT LOSS: 1
SHORTNESS OF BREATH: 1
CHILLS: 0
VOMITING: 0
PALPITATIONS: 1
NAUSEA: 1

## 2025-02-06 ASSESSMENT — FIBROSIS 4 INDEX: FIB4 SCORE: 0.56

## 2025-02-06 NOTE — TELEPHONE ENCOUNTER
MATTEO Carranza: Donovan Dodgene Yared    Topic/issue: MEDICAL ADVICE    Donovan states that he is not feeling well  and he hasn't felt well for the last week. He states that his symptoms are:    NAUSEA  SORE THROAT   FEELS LIKE THROAT IS SWOLLEN  PALPITATIONS    He would like to know what he can do considering his cardiac condition. Please advise.    Thank you,  Tom JORGE    Callback Number: [phone]

## 2025-02-06 NOTE — TELEPHONE ENCOUNTER
Phone Number Called:  [phone]     Call outcome: Did not leave a detailed message. Requested patient to call back.    Message: Called to discuss patient symptoms

## 2025-02-06 NOTE — PROGRESS NOTES
Chief Complaint   Patient presents with    Supraventricular Tachycardia (SVT)     Follow up       Subjective:   Donovan Vail is a 64 y.o. male who presents today for follow-up.     Patient of Dr. Tarango.  Current medical problems include ascending aortic aneurysm with mild AI status post aortic root replacement by Dr. Smith 1/28/2025 (Bio-Bentall 29 mm Konect Corrigan valved conduit, coronary artery reimplantation), ascending aortic aneurysm hemiarch repair (32 mm tube graft).  Other medical problems include history of SVT, tobacco use, hyperlipidemia, methamphetamine use.    He was discharged on postop day 5, 2/2.  Postsurgery he developed a new right bundle branch block.  He recovered as expected with brief atrial fibrillation postoperatively treated with IV amiodarone.  He also had episodes of atypical atrial flutter.  He was discharged on oral amiodarone and apixaban.    Donovan returns today for urgent visit due to sore throat, palpitations, generally feeling unwell. He is very nauseous as well, he can barely tolerate eating. He denies fevers but has general malaise and fatigue which could be a viral infection. He is not weighing himself, he does not have a blood pressure cuff at home.       Past Medical History:   Diagnosis Date    Arrhythmia 2024    svt    Cataract 01/24/2025    being watched    Disorder of thyroid 09/13/2024    polyps on thyroid    High cholesterol 01/24/2025    medicated    Hyperlipidemia     Hypertension 01/24/2025    medicated    Marijuana use 11/17/2017    Pain 01/24/2025    general body    Ulnar nerve entrapment, right 10/04/2017         Family History   Problem Relation Age of Onset    Hypertension Mother     Arterial Aneurysm Father     Heart Disease Father     Heart Attack Neg Hx          Social History     Tobacco Use    Smoking status: Former     Current packs/day: 0.00     Average packs/day: 1 pack/day for 46.4 years (46.4 ttl pk-yrs)     Types: Cigarettes     Start date:  "1974     Quit date: 2021     Years since quittin.0    Smokeless tobacco: Current     Types: Snuff, Chew   Vaping Use    Vaping status: Former   Substance Use Topics    Alcohol use: Yes     Comment: very rarely    Drug use: Yes     Types: Inhaled, Marijuana     Comment: THC, marijuana occasionally         Allergies   Allergen Reactions    Hydrocodone Unspecified     \"It makes me very sick\"  GI issues    Oxycodone Unspecified     \"It makes me very sick\"  GI upset         Current Outpatient Medications   Medication Sig    amiodarone (CORDARONE) 200 MG Tab Take 2 tablets by mouth 2 times daily for 7 days then take 2 tablets by mouth daily for 7 days then take 1 tablet by mouth once daily    apixaban (ELIQUIS) 5mg Tab Take 1 Tablet by mouth 2 times a day. Indications: Thromboembolism secondary to Atrial Fibrillation    acetaminophen (TYLENOL) 500 MG Tab Take 2 Tablets by mouth every 6 hours as needed for Mild Pain or Moderate Pain.    amLODIPine (NORVASC) 10 MG Tab Take 1 Tablet by mouth every day.    traMADol (ULTRAM) 50 MG Tab Take 1 Tablet by mouth every 6 hours as needed for Severe Pain or Moderate Pain for up to 14 days.    metoprolol SR (TOPROL XL) 50 MG TABLET SR 24 HR Take 1 Tablet by mouth 2 times a day.    rosuvastatin (CRESTOR) 5 MG Tab Take 1 Tablet by mouth every evening.    aspirin 81 MG EC tablet Take 1 Tablet by mouth every day. (Patient not taking: Reported on 2025)         Review of Systems   Constitutional:  Positive for malaise/fatigue and weight loss. Negative for chills and fever.   HENT:  Positive for sore throat.    Respiratory:  Positive for shortness of breath. Negative for cough.    Cardiovascular:  Positive for palpitations. Negative for chest pain and leg swelling.   Gastrointestinal:  Positive for nausea. Negative for diarrhea and vomiting.   Neurological:  Negative for dizziness.          Objective:   /66 (BP Location: Left arm, Patient Position: Sitting)   Pulse 86  " " Resp 16   Ht 1.88 m (6' 2\")   Wt 83 kg (183 lb)   SpO2 97%  Body mass index is 23.5 kg/m².         Physical Exam  Vitals reviewed.   Constitutional:       Comments: No acute distress, appears generally unwell   HENT:      Head: Normocephalic and atraumatic.   Cardiovascular:      Rate and Rhythm: Normal rate and regular rhythm.      Heart sounds: No murmur heard.  Pulmonary:      Effort: Pulmonary effort is normal. No respiratory distress.      Comments: Minimal breath sounds in posterior fields, no wheezing or rales  Abdominal:      General: There is no distension.   Musculoskeletal:      Right lower leg: No edema.      Left lower leg: No edema.      Comments: Sternal incision healing, scabbed, no erythema, no clicking or popping   Skin:     General: Skin is warm.   Neurological:      General: No focal deficit present.      Mental Status: He is alert.      Gait: Gait normal.   Psychiatric:         Judgment: Judgment normal.            Assessment:     1. SVT (supraventricular tachycardia) (HCC)  EKG    DX-CHEST-2 VIEWS    Comp Metabolic Panel    CANCELED: Basic Metabolic Panel      2. Aneurysm of ascending aorta without rupture (HCC)  DX-CHEST-2 VIEWS    Comp Metabolic Panel    CBC WITHOUT DIFFERENTIAL    CANCELED: Basic Metabolic Panel      3. S/P aortic aneurysm repair  DX-CHEST-2 VIEWS    Comp Metabolic Panel    CBC WITHOUT DIFFERENTIAL    CANCELED: Basic Metabolic Panel           Medical Decision Making:  Today's Assessment / Plan:   S/P Ao root  replacement by Dr. Smith 1/28/2025 (Bio-Bentall 29 mm Konect Corrigan valved conduit, coronary artery reimplantation)  Post operative SVT, Afib/Afl  Sore throat  Nausea  - in sinus rhythm today, rare PACs. He is on very high dose amiodarone and having nausea, we'll lower this to 200mg daily, may increase in future if arrhythmias return. For now continue apixaban.   - Check CXR and labs given his hyponatremia, rule out electrolyte derangements and anemia.   - Sore " throat post operatively either from ETT, surgical procedure or possibly a viral infection  - he is euvolemic.   Check weights and BP at home. Check in after testing result with us and the surgical team        No follow-ups on file.  Sooner if problems.

## 2025-02-07 ENCOUNTER — TELEPHONE (OUTPATIENT)
Dept: CARDIOLOGY | Facility: MEDICAL CENTER | Age: 65
End: 2025-02-07
Payer: MEDICAID

## 2025-02-07 DIAGNOSIS — R11.0 NAUSEA: ICD-10-CM

## 2025-02-07 NOTE — TELEPHONE ENCOUNTER
DWIGHT    Caller: Nikolas Vail    Topic/issue: Pt states JA called him last night re results- Please call     Callback Number: [phone]    Thank You   Dimple DAVIS

## 2025-02-10 NOTE — TELEPHONE ENCOUNTER
JA: Pt still reporting extreme nausea.    Phone Number Called:  [phone]     Call outcome: Spoke to patient regarding message below.    Message: Called to discuss. Pt said initially was improved and today is back. Pt felt heart was racing a bit with nausea episodes        Joana Fraga P.A.-C.  Tootie Wise R.N.  Caller: Unspecified (3 days ago,  9:20 AM)  I don't believe I called him, may have been the CTS team. But his chest xray and labs do not show urgent findings. He has some minor abnormalities on labs (liver and platelets) which would not be contributing to his symptoms and can be monitored. Hopefully his nausea is better after reducing the amiodarone.

## 2025-02-12 NOTE — TELEPHONE ENCOUNTER
Phone Number Called: [phone]     Call outcome: Spoke to patient regarding message below.    Message: Called to discuss. Pt will stop amiodarone, complete labs and go to ER for abdominal pain. He states his uvula is irritated. Advised urgent care/ PCP evaluation to rule out non-cardiac reasons for nausea such as virus/ bacterial infection.     Answered all questions and concerns, appreciative of call.           Joana Fraga P.A.-C.  You3 minutes ago (4:32 PM)     Stop the amiodarone completely. Repeat labs (I placed order) this week or early next week.  If he develops abdominal pain then he needs to be seen urgently

## 2025-02-14 ENCOUNTER — RESULTS FOLLOW-UP (OUTPATIENT)
Dept: CARDIOLOGY | Facility: MEDICAL CENTER | Age: 65
End: 2025-02-14

## 2025-02-14 ENCOUNTER — HOSPITAL ENCOUNTER (OUTPATIENT)
Dept: LAB | Facility: MEDICAL CENTER | Age: 65
End: 2025-02-14
Attending: PHYSICIAN ASSISTANT
Payer: MEDICAID

## 2025-02-14 DIAGNOSIS — R11.0 NAUSEA: ICD-10-CM

## 2025-02-14 LAB
ALBUMIN SERPL BCP-MCNC: 3.6 G/DL (ref 3.2–4.9)
ALP SERPL-CCNC: 169 U/L (ref 30–99)
ALT SERPL-CCNC: 30 U/L (ref 2–50)
AST SERPL-CCNC: 22 U/L (ref 12–45)
BILIRUB CONJ SERPL-MCNC: <0.2 MG/DL (ref 0.1–0.5)
BILIRUB INDIRECT SERPL-MCNC: ABNORMAL MG/DL (ref 0–1)
BILIRUB SERPL-MCNC: 0.3 MG/DL (ref 0.1–1.5)
LIPASE SERPL-CCNC: 68 U/L (ref 11–82)
PROT SERPL-MCNC: 7.1 G/DL (ref 6–8.2)

## 2025-02-14 PROCEDURE — 83690 ASSAY OF LIPASE: CPT

## 2025-02-14 PROCEDURE — 80076 HEPATIC FUNCTION PANEL: CPT

## 2025-02-14 PROCEDURE — 36415 COLL VENOUS BLD VENIPUNCTURE: CPT

## 2025-02-17 NOTE — PROGRESS NOTES
CHIEF COMPLAINT: Post-op visit     PROCEDURE:   1/28/25 Dr. Smith  AORTIC ROOT REPLACEMENT (Bio-Bentall 29 mm Konect Corrigan valved conduit, coronary artery reimplantation), ascending aortic aneurysm hemiarch repair (32 mm tube graft), circulatory arrest (25 minutes) and intraoperative transesophageal echocardiography     HPI: ***    There were no vitals taken for this visit.    PHYSICAL EXAM:  Physical Exam     PLAN: ***    Continue NOAC for 3 months or per your Cardiologist's recommendations.  We reviewed post operative sternal precautions, weight limits and driving precautions moving forward.  We reviewed SBE prophylaxis.      Overall, we are very pleased with the patient’s progress and we have instructed the patient to follow-up with us in the future should they have any concerns related to their surgery. Otherwise, we will see the patient on a PRN basis. The patient will continue to follow-up with their Cardiologist and PCP.  The patient has been informed that any further prescription refills should be done through their primary care physician and/or cardiologist.  They acknowledged understanding.  Thank you for allowing us to participate in the care of this very pleasant patient and please let us know if there is any way we may be of further assistance.

## 2025-02-18 ENCOUNTER — TELEPHONE (OUTPATIENT)
Dept: CARDIOLOGY | Facility: MEDICAL CENTER | Age: 65
End: 2025-02-18
Payer: MEDICAID

## 2025-02-18 NOTE — TELEPHONE ENCOUNTER
----- Message from Physician Claudia Vasquez M.D. sent at 2/18/2025  8:49 AM PST -----  Regarding: RE: EP NEW REF BY: MATTEO  No I can see  ----- Message -----  From: Faizan Mejia Ass't  Sent: 2/13/2025  10:06 AM PST  To: Claudia Vasquez M.D.  Subject: EP NEW REF BY: MATTEO Payne, Dr. Vasquez,    Patient is scheduled for SVT as EP new ref by MATTEO. Echo order is scheduled on March 2025. Should patient reschedule until echo is done? Pt has an appt with you on 02/27/25.

## 2025-02-19 ENCOUNTER — APPOINTMENT (OUTPATIENT)
Dept: RADIOLOGY | Facility: MEDICAL CENTER | Age: 65
End: 2025-02-19
Attending: EMERGENCY MEDICINE
Payer: MEDICAID

## 2025-02-19 ENCOUNTER — HOSPITAL ENCOUNTER (EMERGENCY)
Facility: MEDICAL CENTER | Age: 65
End: 2025-02-19
Attending: EMERGENCY MEDICINE
Payer: MEDICAID

## 2025-02-19 VITALS
HEART RATE: 91 BPM | SYSTOLIC BLOOD PRESSURE: 121 MMHG | BODY MASS INDEX: 24.38 KG/M2 | TEMPERATURE: 97.6 F | WEIGHT: 190 LBS | RESPIRATION RATE: 19 BRPM | DIASTOLIC BLOOD PRESSURE: 65 MMHG | HEIGHT: 74 IN | OXYGEN SATURATION: 96 %

## 2025-02-19 DIAGNOSIS — R07.89 CHEST WALL PAIN: ICD-10-CM

## 2025-02-19 LAB
ALBUMIN SERPL BCP-MCNC: 3.9 G/DL (ref 3.2–4.9)
ALBUMIN/GLOB SERPL: 1.1 G/DL
ALP SERPL-CCNC: 136 U/L (ref 30–99)
ALT SERPL-CCNC: 22 U/L (ref 2–50)
ANION GAP SERPL CALC-SCNC: 15 MMOL/L (ref 7–16)
AST SERPL-CCNC: 21 U/L (ref 12–45)
BASOPHILS # BLD AUTO: 0.5 % (ref 0–1.8)
BASOPHILS # BLD: 0.06 K/UL (ref 0–0.12)
BILIRUB SERPL-MCNC: 0.3 MG/DL (ref 0.1–1.5)
BUN SERPL-MCNC: 20 MG/DL (ref 8–22)
CALCIUM ALBUM COR SERPL-MCNC: 9.6 MG/DL (ref 8.5–10.5)
CALCIUM SERPL-MCNC: 9.5 MG/DL (ref 8.5–10.5)
CHLORIDE SERPL-SCNC: 104 MMOL/L (ref 96–112)
CO2 SERPL-SCNC: 19 MMOL/L (ref 20–33)
CREAT SERPL-MCNC: 1 MG/DL (ref 0.5–1.4)
EKG IMPRESSION: NORMAL
EOSINOPHIL # BLD AUTO: 0.08 K/UL (ref 0–0.51)
EOSINOPHIL NFR BLD: 0.7 % (ref 0–6.9)
ERYTHROCYTE [DISTWIDTH] IN BLOOD BY AUTOMATED COUNT: 44.7 FL (ref 35.9–50)
GFR SERPLBLD CREATININE-BSD FMLA CKD-EPI: 85 ML/MIN/1.73 M 2
GLOBULIN SER CALC-MCNC: 3.5 G/DL (ref 1.9–3.5)
GLUCOSE SERPL-MCNC: 125 MG/DL (ref 65–99)
HCT VFR BLD AUTO: 33.1 % (ref 42–52)
HGB BLD-MCNC: 10.8 G/DL (ref 14–18)
IMM GRANULOCYTES # BLD AUTO: 0.05 K/UL (ref 0–0.11)
IMM GRANULOCYTES NFR BLD AUTO: 0.4 % (ref 0–0.9)
LIPASE SERPL-CCNC: 31 U/L (ref 11–82)
LYMPHOCYTES # BLD AUTO: 1.15 K/UL (ref 1–4.8)
LYMPHOCYTES NFR BLD: 9.5 % (ref 22–41)
MCH RBC QN AUTO: 26.5 PG (ref 27–33)
MCHC RBC AUTO-ENTMCNC: 32.6 G/DL (ref 32.3–36.5)
MCV RBC AUTO: 81.3 FL (ref 81.4–97.8)
MONOCYTES # BLD AUTO: 0.68 K/UL (ref 0–0.85)
MONOCYTES NFR BLD AUTO: 5.6 % (ref 0–13.4)
NEUTROPHILS # BLD AUTO: 10.1 K/UL (ref 1.82–7.42)
NEUTROPHILS NFR BLD: 83.3 % (ref 44–72)
NRBC # BLD AUTO: 0 K/UL
NRBC BLD-RTO: 0 /100 WBC (ref 0–0.2)
PLATELET # BLD AUTO: 508 K/UL (ref 164–446)
PMV BLD AUTO: 8.2 FL (ref 9–12.9)
POTASSIUM SERPL-SCNC: 4.1 MMOL/L (ref 3.6–5.5)
PROT SERPL-MCNC: 7.4 G/DL (ref 6–8.2)
RBC # BLD AUTO: 4.07 M/UL (ref 4.7–6.1)
SODIUM SERPL-SCNC: 138 MMOL/L (ref 135–145)
TROPONIN T SERPL-MCNC: 32 NG/L (ref 6–19)
TROPONIN T SERPL-MCNC: 33 NG/L (ref 6–19)
WBC # BLD AUTO: 12.1 K/UL (ref 4.8–10.8)

## 2025-02-19 PROCEDURE — 700101 HCHG RX REV CODE 250: Mod: UD

## 2025-02-19 PROCEDURE — 71275 CT ANGIOGRAPHY CHEST: CPT

## 2025-02-19 PROCEDURE — 84484 ASSAY OF TROPONIN QUANT: CPT

## 2025-02-19 PROCEDURE — 85025 COMPLETE CBC W/AUTO DIFF WBC: CPT

## 2025-02-19 PROCEDURE — 96374 THER/PROPH/DIAG INJ IV PUSH: CPT | Mod: XU

## 2025-02-19 PROCEDURE — 700117 HCHG RX CONTRAST REV CODE 255: Mod: UD | Performed by: EMERGENCY MEDICINE

## 2025-02-19 PROCEDURE — 80053 COMPREHEN METABOLIC PANEL: CPT

## 2025-02-19 PROCEDURE — 71045 X-RAY EXAM CHEST 1 VIEW: CPT

## 2025-02-19 PROCEDURE — 93005 ELECTROCARDIOGRAM TRACING: CPT | Mod: TC | Performed by: EMERGENCY MEDICINE

## 2025-02-19 PROCEDURE — 36415 COLL VENOUS BLD VENIPUNCTURE: CPT

## 2025-02-19 PROCEDURE — 83690 ASSAY OF LIPASE: CPT

## 2025-02-19 PROCEDURE — 93005 ELECTROCARDIOGRAM TRACING: CPT | Mod: TC

## 2025-02-19 PROCEDURE — 99285 EMERGENCY DEPT VISIT HI MDM: CPT

## 2025-02-19 RX ORDER — METOPROLOL TARTRATE 1 MG/ML
5 INJECTION, SOLUTION INTRAVENOUS ONCE
Status: COMPLETED | OUTPATIENT
Start: 2025-02-19 | End: 2025-02-19

## 2025-02-19 RX ADMIN — METOPROLOL TARTRATE 5 MG: 5 INJECTION INTRAVENOUS at 16:16

## 2025-02-19 RX ADMIN — IOHEXOL 100 ML: 350 INJECTION, SOLUTION INTRAVENOUS at 16:00

## 2025-02-19 ASSESSMENT — FIBROSIS 4 INDEX: FIB4 SCORE: 0.44

## 2025-02-19 NOTE — ED PROVIDER NOTES
ED Provider Note    CHIEF COMPLAINT  Chief Complaint   Patient presents with    Abdominal Pain     Pt reports abd pain and palpitations after being placed arrested today. Pt had hx of open heart surgery on 1/28. Per EMS, pt reported abd pain for a week, saw his cardiologist and his tests came back unremarkable.       EXTERNAL RECORDS REVIEWED  Other reviewed a discharge summary from 2 February when the patient was admitted with an ascending aortic aneurysm approximately 5.2 to 5.4 cm with some mild aortic regurgitation.  The patient underwent surgical correction by Dr. Madrid on 28 January.    HPI/BRAYDON Man Jerrell Vail is a 64 y.o. male who presents with abdominal pain and chest pain.  The patient is getting incarcerated today and he started complaining of abdominal pain and chest pain.  He was sent to the ER for medical clearance.  He does have a history of a recent aortic aneurysm repair on 28 January as mentioned above.  He states he did recently follow-up with cardiology.  He states he has been having some left-sided chest pain anteriorly.  He has no known history of ischemic heart disease.  The patient states he also has some abdominal pain.  He does have some slight dyspnea.  He has not any recent fevers nor cough.    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    SURGICAL HISTORY   has a past surgical history that includes split thickness skin graft (1/21/2015); other surgical procedure (Right, 2013); other surgical procedure (Right, 2014); nerve ulnar transfer (Right, 03/2017); other surgical procedure (Right, 8577-1308); inguinal hernia repair (Right, 2005); inguinal hernia laparoscopic bilateral (Bilateral, 11/20/2017); echo transesoph, for monitoring  "(2025); replace aort valve w/allograft valve (2025); aortic valve replacement (2025); and aortic ascending dissection (2025).    FAMILY HISTORY  Family History   Problem Relation Age of Onset    Hypertension Mother     Arterial Aneurysm Father     Heart Disease Father     Heart Attack Neg Hx        SOCIAL HISTORY  Social History     Tobacco Use    Smoking status: Former     Current packs/day: 0.00     Average packs/day: 1 pack/day for 46.4 years (46.4 ttl pk-yrs)     Types: Cigarettes     Start date: 1974     Quit date: 2021     Years since quittin.1    Smokeless tobacco: Current     Types: Snuff, Chew   Vaping Use    Vaping status: Former   Substance and Sexual Activity    Alcohol use: Yes     Comment: very rarely    Drug use: Yes     Types: Inhaled, Marijuana     Comment: THC, marijuana occasionally    Sexual activity: Not on file       CURRENT MEDICATIONS  Home Medications       Reviewed by Nafisa Sanchez R.N. (Registered Nurse) on 25 at 1220  Med List Status: <None>     Medication Last Dose Status   acetaminophen (TYLENOL) 500 MG Tab  Active   amLODIPine (NORVASC) 10 MG Tab  Active   apixaban (ELIQUIS) 5mg Tab  Active   aspirin 81 MG EC tablet  Active   metoprolol SR (TOPROL XL) 50 MG TABLET SR 24 HR  Active   rosuvastatin (CRESTOR) 5 MG Tab  Active                  Audit from Redirected Encounters    **Home medications have not yet been reviewed for this encounter**         ALLERGIES  Allergies   Allergen Reactions    Hydrocodone Unspecified     \"It makes me very sick\"  GI issues    Oxycodone Unspecified     \"It makes me very sick\"  GI upset       PHYSICAL EXAM  VITAL SIGNS: /74   Pulse 92   Temp 36.4 °C (97.6 °F) (Temporal)   Resp 16   Ht 1.88 m (6' 2\")   Wt 86.2 kg (190 lb)   SpO2 100%   BMI 24.39 kg/m²    General The patient appears uncomfortable     HEENT unremarkable    Pulmonary the patient's lungs are clear to auscultation " bilaterally    Cardiovascular S1-S2 with a slightly tachycardic rate the patient does have reproducible pain in the left anterior chest    GI abdomen soft    Skin no rashes, pallor, no jaundice    Extremities no distal edema    Neurologic examination is grossly intact        EKG/LABS  Results for orders placed or performed during the hospital encounter of 02/19/25   CBC WITH DIFFERENTIAL    Collection Time: 02/19/25 12:25 PM   Result Value Ref Range    WBC 12.1 (H) 4.8 - 10.8 K/uL    RBC 4.07 (L) 4.70 - 6.10 M/uL    Hemoglobin 10.8 (L) 14.0 - 18.0 g/dL    Hematocrit 33.1 (L) 42.0 - 52.0 %    MCV 81.3 (L) 81.4 - 97.8 fL    MCH 26.5 (L) 27.0 - 33.0 pg    MCHC 32.6 32.3 - 36.5 g/dL    RDW 44.7 35.9 - 50.0 fL    Platelet Count 508 (H) 164 - 446 K/uL    MPV 8.2 (L) 9.0 - 12.9 fL    Neutrophils-Polys 83.30 (H) 44.00 - 72.00 %    Lymphocytes 9.50 (L) 22.00 - 41.00 %    Monocytes 5.60 0.00 - 13.40 %    Eosinophils 0.70 0.00 - 6.90 %    Basophils 0.50 0.00 - 1.80 %    Immature Granulocytes 0.40 0.00 - 0.90 %    Nucleated RBC 0.00 0.00 - 0.20 /100 WBC    Neutrophils (Absolute) 10.10 (H) 1.82 - 7.42 K/uL    Lymphs (Absolute) 1.15 1.00 - 4.80 K/uL    Monos (Absolute) 0.68 0.00 - 0.85 K/uL    Eos (Absolute) 0.08 0.00 - 0.51 K/uL    Baso (Absolute) 0.06 0.00 - 0.12 K/uL    Immature Granulocytes (abs) 0.05 0.00 - 0.11 K/uL    NRBC (Absolute) 0.00 K/uL   COMP METABOLIC PANEL    Collection Time: 02/19/25 12:25 PM   Result Value Ref Range    Sodium 138 135 - 145 mmol/L    Potassium 4.1 3.6 - 5.5 mmol/L    Chloride 104 96 - 112 mmol/L    Co2 19 (L) 20 - 33 mmol/L    Anion Gap 15.0 7.0 - 16.0    Glucose 125 (H) 65 - 99 mg/dL    Bun 20 8 - 22 mg/dL    Creatinine 1.00 0.50 - 1.40 mg/dL    Calcium 9.5 8.5 - 10.5 mg/dL    Correct Calcium 9.6 8.5 - 10.5 mg/dL    AST(SGOT) 21 12 - 45 U/L    ALT(SGPT) 22 2 - 50 U/L    Alkaline Phosphatase 136 (H) 30 - 99 U/L    Total Bilirubin 0.3 0.1 - 1.5 mg/dL    Albumin 3.9 3.2 - 4.9 g/dL    Total Protein  7.4 6.0 - 8.2 g/dL    Globulin 3.5 1.9 - 3.5 g/dL    A-G Ratio 1.1 g/dL   LIPASE    Collection Time: 25 12:25 PM   Result Value Ref Range    Lipase 31 11 - 82 U/L   Troponin    Collection Time: 25 12:25 PM   Result Value Ref Range    Troponin T 32 (H) 6 - 19 ng/L   ESTIMATED GFR    Collection Time: 25 12:25 PM   Result Value Ref Range    GFR (CKD-EPI) 85 >60 mL/min/1.73 m 2   EKG    Collection Time: 25  1:19 PM   Result Value Ref Range    Report       [facility]    Test Date:  2025  Pt Name:    TARYN ELLINGTON                 Department: ER  MRN:        [id number]                      Room:       [facility]  Gender:     Male                         Technician: [id number]  :        1960                   Requested By:ER TRIAGE PROTOCOL  Order #:    [id number]                    Reading MD: NESHA SCOTT MD    Measurements  Intervals                                Axis  Rate:       90                           P:          -21  IL:         202                          QRS:        -37  QRSD:       136                          T:          22  QT:         407  QTc:        498    Interpretive Statements  Twelve-lead EKG shows a normal sinus rhythm with ventricular to 90, QRS shows  a right bundle branch block, T waves inverted anteriorly with some ST segment  depression laterally all of which is unchanged from prior.  Overall no  dynamic change from prior  Electronically Signed On 2025 13:19:13  PST by NESHA SCOTT MD     TROPONIN    Collection Time: 25  2:35 PM   Result Value Ref Range    Troponin T 33 (H) 6 - 19 ng/L       I have independently interpreted this EKG    RADIOLOGY/PROCEDURES       Radiologist interpretation:  CT-CTA COMPLETE THORACOABDOMINAL AORTA   Final Result      1.  Surgical change consistent with recent aortic valvular replacement and ascending thoracic aortic repair/graft.      2.  No evidence of aortic aneurysm or  dissection.      3.  Atherosclerotic vascular calcification.      4.   Small pericardial effusion.      5.  Fatty liver.      6.  5 mm right upper pole renal calyceal stone.      7.  High-grade stenosis of the celiac artery seen just beyond its origin likely secondary to attenuation by the arcuate ligament.                        DX-CHEST-PORTABLE (1 VIEW)   Final Result      1.  Left base atelectasis.   2.  Multilevel remote right rib fractures.          COURSE & MEDICAL DECISION MAKING    This is 64-year-old gentleman who presents for medical clearance for incarceration.  Unfortunately patient has recently had a significant open procedure of the chest to fix an aortic arch injury.  Therefore CT scan was performed to make sure there is no evidence of a leak or postsurgical complication and this was negative.  From an ischemic standpoint the patient's troponin is not dynamic on repeat draws.  His EKG does not show any ischemia.  The patient does seem anxious and I am concerned he is continue to abuse methamphetamines.  At this time the patient will be medically cleared for incarceration.  His chest pain could be a chest wall pain from the surgical site as he does have some reproducible tenderness.  As for the abdominal pain do not have a clear source.  His abdomen is nonsurgical.    FINAL DIAGNOSIS  1.  Chest wall pain  2.  Abdominal pain  3.  Methamphetamine abuse  4.  Medical clearance for incarceration    The patient will be discharged in stable condition     Electronically signed by: Nayan Helm M.D., 2/19/2025 1:13 PM

## 2025-02-19 NOTE — ED TRIAGE NOTES
Chief Complaint   Patient presents with    Abdominal Pain     Pt reports abd pain and palpitations after being placed arrested today. Pt had hx of open heart surgery on 1/28. Per EMS, pt reported abd pain for a week, saw his cardiologist and his tests came back unremarkable.       BIB [facility] and PD to [facility], pt on monitor, labs drawn and sent.   Pt Reports abd pain and chest pain x 1 hour  Pt arrives Gcs 15    Medications given en route:none    Vitals:    02/19/25 1215   BP: 115/74   Pulse: 92   Resp: 16   Temp: 36.4 °C (97.6 °F)   SpO2: 100%

## 2025-02-20 NOTE — ED NOTES
Pt discharged to ambulance bay with PD with steady gait. GCS 15. IV discontinued and gauze placed, pt in possession of belongings. Pt provided discharge education and information pertaining to medications and follow up appointments. Pt received copy of discharge instructions and verbalized understanding.     Vitals:    02/19/25 1640   BP:    Pulse: 91   Resp: 19   Temp:    SpO2: 96%

## 2025-02-24 ENCOUNTER — APPOINTMENT (OUTPATIENT)
Dept: CARDIOLOGY | Facility: MEDICAL CENTER | Age: 65
End: 2025-02-24
Attending: PHYSICIAN ASSISTANT
Payer: MEDICAID

## 2025-02-24 ENCOUNTER — APPOINTMENT (OUTPATIENT)
Dept: CARDIOTHORACIC SURGERY | Facility: MEDICAL CENTER | Age: 65
End: 2025-02-24
Payer: MEDICAID

## 2025-03-09 LAB — COMPONENT P 8504P: NORMAL

## 2025-03-10 ENCOUNTER — OFFICE VISIT (OUTPATIENT)
Facility: MEDICAL CENTER | Age: 65
End: 2025-03-10
Payer: MEDICAID

## 2025-03-10 VITALS
SYSTOLIC BLOOD PRESSURE: 110 MMHG | TEMPERATURE: 99.1 F | OXYGEN SATURATION: 96 % | HEART RATE: 82 BPM | BODY MASS INDEX: 23.74 KG/M2 | HEIGHT: 74 IN | WEIGHT: 185 LBS | DIASTOLIC BLOOD PRESSURE: 72 MMHG

## 2025-03-10 DIAGNOSIS — Z98.890 POSTOPERATIVE STATE: ICD-10-CM

## 2025-03-10 PROCEDURE — 3074F SYST BP LT 130 MM HG: CPT

## 2025-03-10 PROCEDURE — 99024 POSTOP FOLLOW-UP VISIT: CPT

## 2025-03-10 PROCEDURE — 3078F DIAST BP <80 MM HG: CPT

## 2025-03-10 ASSESSMENT — FIBROSIS 4 INDEX: FIB4 SCORE: 0.55

## 2025-03-10 NOTE — PROGRESS NOTES
"CHIEF COMPLAINT: Post-op visit     PROCEDURE:   1/28/25 Dr. Smith  AORTIC ROOT REPLACEMENT (Bio-Bentall 29 mm Konect Corrigan valved conduit, coronary artery reimplantation), ascending aortic aneurysm hemiarch repair (32 mm tube graft), circulatory arrest (25 minutes) and intraoperative transesophageal echocardiography     HPI:   Patient arrived accompanied with two guards, in [facility]. Reports feeling well, denies pain, shortness of breath, lower extremity swelling. States he has not been getting medications he was prescribed on discharge since being incarcerated. Paperwork reviewed, medications verified, and match his hospital discharge paperwork. He has been walking minimally since incarcerated, but makes effort to stay active. Sternal precautions reviewed, expresses compliance thus far.       /72 (BP Location: Left arm, Patient Position: Sitting, BP Cuff Size: Adult)   Pulse 82   Temp 37.3 °C (99.1 °F) (Temporal)   Ht 1.88 m (6' 2\")   Wt 83.9 kg (185 lb)   SpO2 96%     PHYSICAL EXAM:  Cardiac: S1S2  Neuro:  AAO x 3  Resp:  CTA  Wounds:  Sternotomy: well healed, CDI. Chest tube sites: well healed, CDI.  Sternum:  Stable, no popping or clicking.       PLAN:   Discharge from clinic. Continue to follow up with PCP, Cardiology.       Continue NOAC for 3 months or per your Cardiologist's recommendations.  We reviewed post operative sternal precautions, weight limits and driving precautions moving forward.  We reviewed SBE prophylaxis.      Overall, we are very pleased with the patient’s progress and we have instructed the patient to follow-up with us in the future should they have any concerns related to their surgery. Otherwise, we will see the patient on a PRN basis. The patient will continue to follow-up with their Cardiologist and PCP.  The patient has been informed that any further prescription refills should be done through their primary care physician and/or cardiologist.  They acknowledged " understanding.  Thank you for allowing us to participate in the care of this very pleasant patient and please let us know if there is any way we may be of further assistance.

## 2025-03-11 ENCOUNTER — APPOINTMENT (OUTPATIENT)
Dept: INTERNAL MEDICINE | Facility: OTHER | Age: 65
End: 2025-03-11
Payer: MEDICAID

## 2025-03-31 ENCOUNTER — TELEPHONE (OUTPATIENT)
Dept: CARDIOLOGY | Facility: MEDICAL CENTER | Age: 65
End: 2025-03-31
Payer: MEDICAID

## 2025-03-31 NOTE — TELEPHONE ENCOUNTER
This patient missed his appointment today. Please reschedule soon with nurse provider. Thank you.  ANETTE    
Discharged

## 2025-04-01 ENCOUNTER — TELEPHONE (OUTPATIENT)
Dept: CARDIOLOGY | Facility: MEDICAL CENTER | Age: 65
End: 2025-04-01
Payer: MEDICAID

## 2025-04-04 ENCOUNTER — TELEPHONE (OUTPATIENT)
Dept: CARDIOLOGY | Facility: MEDICAL CENTER | Age: 65
End: 2025-04-04
Payer: MEDICAID

## 2025-04-30 DIAGNOSIS — Z98.890 S/P ASCENDING AORTIC ANEURYSM REPAIR: ICD-10-CM

## 2025-04-30 DIAGNOSIS — I47.10 SVT (SUPRAVENTRICULAR TACHYCARDIA) (HCC): ICD-10-CM

## 2025-04-30 DIAGNOSIS — Z95.2 S/P AVR (AORTIC VALVE REPLACEMENT): ICD-10-CM

## 2025-04-30 DIAGNOSIS — Z86.79 S/P ASCENDING AORTIC ANEURYSM REPAIR: ICD-10-CM

## 2025-04-30 RX ORDER — METOPROLOL SUCCINATE 50 MG/1
50 TABLET, EXTENDED RELEASE ORAL 2 TIMES DAILY
Qty: 180 TABLET | Refills: 3 | OUTPATIENT
Start: 2025-04-30

## 2025-04-30 RX ORDER — ASPIRIN 81 MG/1
81 TABLET ORAL DAILY
Qty: 100 TABLET | Refills: 3 | OUTPATIENT
Start: 2025-04-30

## 2025-04-30 RX ORDER — ACETAMINOPHEN 500 MG
1000 TABLET ORAL EVERY 6 HOURS PRN
Qty: 30 TABLET | OUTPATIENT
Start: 2025-04-30

## 2025-05-01 ENCOUNTER — TELEPHONE (OUTPATIENT)
Dept: VASCULAR LAB | Facility: MEDICAL CENTER | Age: 65
End: 2025-05-01
Payer: MEDICAID

## 2025-05-01 NOTE — TELEPHONE ENCOUNTER
Received Refill request via [facility]  for ELIQUIS 5MG TABLETS. (Quantity:180, Day Supply:90)     Insurance: [employer] MEDICAID  Member ID:  [id number]  BIN: [id number]  PCN: [id number]  Group: [id number]     Ran Test claim via [facility] Pays for a $0/30DS  copay. Will outreach to patient to offer specialty pharmacy services and or release to preferred pharmacy    MAXWELL Carmona, PhT  Vascular Pharmacy Liaison (Rx Coordinator)  P: [phone]  5/1/2025 12:41 PM

## 2025-05-02 ENCOUNTER — PATIENT MESSAGE (OUTPATIENT)
Dept: CARDIOLOGY | Facility: MEDICAL CENTER | Age: 65
End: 2025-05-02
Payer: MEDICAID

## 2025-05-02 ENCOUNTER — TELEPHONE (OUTPATIENT)
Dept: INTERNAL MEDICINE | Facility: OTHER | Age: 65
End: 2025-05-02

## 2025-05-02 NOTE — TELEPHONE ENCOUNTER
Phone Number Called: [phone]    Call outcome: Left detailed message for patient. Informed to call back with any additional questions.    Message: Called patient left message to call back to schedule appointment since he hasn't been seen since November 2024. Will route to Dr. De La Cruz and front office for review and appointment.

## 2025-05-05 ENCOUNTER — RESEARCH ENCOUNTER (OUTPATIENT)
Dept: RESEARCH | Facility: MEDICAL CENTER | Age: 65
End: 2025-05-05
Payer: MEDICAID

## 2025-05-05 DIAGNOSIS — Z00.6 CLINICAL TRIAL PARTICIPANT: ICD-10-CM

## 2025-05-07 ENCOUNTER — OFFICE VISIT (OUTPATIENT)
Dept: INTERNAL MEDICINE | Facility: OTHER | Age: 65
End: 2025-05-07
Payer: MEDICAID

## 2025-05-07 VITALS
TEMPERATURE: 98.8 F | SYSTOLIC BLOOD PRESSURE: 116 MMHG | WEIGHT: 205 LBS | DIASTOLIC BLOOD PRESSURE: 77 MMHG | HEIGHT: 74 IN | HEART RATE: 85 BPM | OXYGEN SATURATION: 91 % | BODY MASS INDEX: 26.31 KG/M2

## 2025-05-07 DIAGNOSIS — I35.1 NONRHEUMATIC AORTIC VALVE INSUFFICIENCY: ICD-10-CM

## 2025-05-07 DIAGNOSIS — I47.10 SVT (SUPRAVENTRICULAR TACHYCARDIA) (HCC): ICD-10-CM

## 2025-05-07 DIAGNOSIS — I71.21 ANEURYSM OF ASCENDING AORTA WITHOUT RUPTURE (HCC): ICD-10-CM

## 2025-05-07 PROCEDURE — 3074F SYST BP LT 130 MM HG: CPT | Mod: GE

## 2025-05-07 PROCEDURE — 3078F DIAST BP <80 MM HG: CPT | Mod: GE

## 2025-05-07 PROCEDURE — 99213 OFFICE O/P EST LOW 20 MIN: CPT | Mod: GE

## 2025-05-07 ASSESSMENT — FIBROSIS 4 INDEX: FIB4 SCORE: 0.56

## 2025-05-07 NOTE — LETTER
May 7, 2025    To Whom It May Concern:         This is confirmation that Donovan Vail attended his scheduled appointment with Malissa Garcia M.D. on 5/07/25. He is okay to go back to his work today 5/7/2025.          If you have any questions please do not hesitate to call me at the phone number listed below.    Sincerely,          Malissa Garcia M.D.  [phone]

## 2025-05-07 NOTE — PATIENT INSTRUCTIONS
You need to take all medications recommended by cardiology team and cardiothorasic surgery team  - Amlodipine 1 mg once a day  - apixaban 5 mg twice a day  - aspirin 81 mg everday  - metoprolol 50mg twice a day  - rosuvastatin 5 mg every evening    Make appointment for echocardiogram and appointment with cardiology team Dr. Tarango  
Yes

## 2025-05-08 ENCOUNTER — APPOINTMENT (OUTPATIENT)
Dept: RESEARCH | Facility: MEDICAL CENTER | Age: 65
End: 2025-05-08
Payer: MEDICAID

## 2025-05-08 DIAGNOSIS — Z86.79 S/P ASCENDING AORTIC ANEURYSM REPAIR: ICD-10-CM

## 2025-05-08 DIAGNOSIS — I47.10 SVT (SUPRAVENTRICULAR TACHYCARDIA) (HCC): ICD-10-CM

## 2025-05-08 DIAGNOSIS — Z98.890 S/P ASCENDING AORTIC ANEURYSM REPAIR: ICD-10-CM

## 2025-05-08 RX ORDER — ASPIRIN 81 MG/1
81 TABLET ORAL DAILY
Qty: 100 TABLET | Refills: 3 | Status: SHIPPED | OUTPATIENT
Start: 2025-05-08

## 2025-05-08 RX ORDER — METOPROLOL SUCCINATE 50 MG/1
50 TABLET, EXTENDED RELEASE ORAL 2 TIMES DAILY
Qty: 180 TABLET | Refills: 3 | Status: SHIPPED | OUTPATIENT
Start: 2025-05-08

## 2025-05-08 RX ORDER — ASPIRIN 81 MG/1
81 TABLET ORAL DAILY
Qty: 100 TABLET | Refills: 3 | Status: CANCELLED | OUTPATIENT
Start: 2025-05-08
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
## 2025-05-08 NOTE — TELEPHONE ENCOUNTER
DWIGHT  Received request via: Patient    Was the patient seen in the last year in this department? Yes 2/6/25    Does the patient have an active prescription (recently filled or refills available) for medication(s) requested? Yes. Refill request has been refused in Epic. Contacted pharmacy and called in most recent prescription.    Pharmacy Name: [facility]   Address: [address] [place] [id number]  Phone: [phone]    Does the patient have assisted Plus and need 100-day supply? (This applies to ALL medications) Patient does not have SCP    Thank you  Jennifer THORNTON

## 2025-05-12 DIAGNOSIS — E78.00 PURE HYPERCHOLESTEROLEMIA: ICD-10-CM

## 2025-05-12 DIAGNOSIS — Z98.890 S/P ASCENDING AORTIC ANEURYSM REPAIR: ICD-10-CM

## 2025-05-12 DIAGNOSIS — Z86.79 S/P ASCENDING AORTIC ANEURYSM REPAIR: ICD-10-CM

## 2025-05-12 DIAGNOSIS — I47.10 SVT (SUPRAVENTRICULAR TACHYCARDIA) (HCC): ICD-10-CM

## 2025-05-12 RX ORDER — ROSUVASTATIN CALCIUM 5 MG/1
5 TABLET, COATED ORAL EVERY EVENING
Qty: 90 TABLET | Refills: 2 | Status: SHIPPED | OUTPATIENT
Start: 2025-05-12

## 2025-05-12 RX ORDER — METOPROLOL SUCCINATE 50 MG/1
50 TABLET, EXTENDED RELEASE ORAL 2 TIMES DAILY
Qty: 180 TABLET | Refills: 3 | Status: CANCELLED | OUTPATIENT
Start: 2025-05-12

## 2025-05-12 RX ORDER — ASPIRIN 81 MG/1
81 TABLET ORAL DAILY
Qty: 100 TABLET | Refills: 3 | Status: CANCELLED | OUTPATIENT
Start: 2025-05-12

## 2025-05-14 ENCOUNTER — HOSPITAL ENCOUNTER (OUTPATIENT)
Dept: LAB | Facility: MEDICAL CENTER | Age: 65
End: 2025-05-14
Attending: FAMILY MEDICINE
Payer: MEDICAID

## 2025-05-14 DIAGNOSIS — Z00.6 CLINICAL TRIAL PARTICIPANT: ICD-10-CM

## 2025-05-16 LAB
ELF SCORE: 10.34 -
HA (HYALURONIC ACID): 107.81 NG/ML
PIIINP (AMINO-TERMINAL PROPEPTIDE): 12.79 NG/ML
RELATIVE RISK: ABNORMAL
RISK GROUP: ABNORMAL
RISK: 23.6 %
TIMP-1 (TISSUE INHIBITOR OF MMP1): 242.5 NG/ML

## 2025-05-19 ENCOUNTER — RESULTS FOLLOW-UP (OUTPATIENT)
Dept: MEDICAL GROUP | Facility: PHYSICIAN GROUP | Age: 65
End: 2025-05-19
Payer: MEDICAID

## 2025-05-23 LAB
APOB+LDLR+PCSK9 GENE MUT ANL BLD/T: NOT DETECTED
BRCA1+BRCA2 DEL+DUP + FULL MUT ANL BLD/T: NOT DETECTED
MLH1+MSH2+MSH6+PMS2 GN DEL+DUP+FUL M: NOT DETECTED

## 2025-05-30 ENCOUNTER — NON-PROVIDER VISIT (OUTPATIENT)
Dept: OCCUPATIONAL MEDICINE | Facility: CLINIC | Age: 65
End: 2025-05-30

## 2025-05-30 DIAGNOSIS — Z11.1 PPD SCREENING TEST: Primary | ICD-10-CM

## 2025-06-02 ENCOUNTER — NON-PROVIDER VISIT (OUTPATIENT)
Dept: OCCUPATIONAL MEDICINE | Facility: CLINIC | Age: 65
End: 2025-06-02

## 2025-06-02 LAB — TB WHEAL 3D P 5 TU DIAM: NORMAL MM

## 2025-06-18 ENCOUNTER — APPOINTMENT (OUTPATIENT)
Facility: MEDICAL CENTER | Age: 65
End: 2025-06-18
Attending: INTERNAL MEDICINE
Payer: MEDICAID

## (undated) DEVICE — INSERT STEALTH #5 - (10/BX)

## (undated) DEVICE — TUBE CONNECTING SUCTION - CLEAR PLASTIC STERILE 72 IN (50EA/CA)

## (undated) DEVICE — KIT CATHETERIZATION TWO-LUMEN CENTRAL VENOUS PI CVC (5EA/CA)

## (undated) DEVICE — DEVICESORBAFIX W/30 ABSOB CLP - (5EA/CA)

## (undated) DEVICE — Device

## (undated) DEVICE — STOPCOCK MALE 4-WAY - (50/CA)

## (undated) DEVICE — GOWN SURGICAL XX-LARGE - (28EA/CA) SIRUS NON REINFORCED

## (undated) DEVICE — TRANSDUCER BIFURCATED MONITORING KIT (10EA/CA)

## (undated) DEVICE — CONTAINER SPECIMEN BAG OR - STERILE 4 OZ W/LID (100EA/CA)

## (undated) DEVICE — BONE WAX (12PK/BX)

## (undated) DEVICE — ORGANIZER SUTURE GABBAY-FRAT - ER STERILE (3/SET 4ST/BX)

## (undated) DEVICE — SUTURE 4-0 30CM STRATAFIX SPIRAL PS-2 (12EA/BX)

## (undated) DEVICE — SODIUM CHL IRRIGATION 0.9% 1000ML (12EA/CA)

## (undated) DEVICE — INSERT STEALTH #3 - (10/BX)

## (undated) DEVICE — KIT ROOM DECONTAMINATION

## (undated) DEVICE — SET BIFURCATED BLOOD - (48EA/CS)

## (undated) DEVICE — QUICKLOADS COR-KNOT TITANIUM - (6EA/PK 12PK/BX)

## (undated) DEVICE — TUBE E-T HI-LO CUFF 8.0MM (10EA/PK)

## (undated) DEVICE — TUBE E-T HI-LO CUFF 7.5MM (10EA/PK)

## (undated) DEVICE — GLOVE BIOGEL PI INDICATOR SZ 8.0 SURGICAL PF LF -(50/BX 4BX/CA)

## (undated) DEVICE — SET FLUID WARMING STANDARD FLOW - (10/CA)

## (undated) DEVICE — TRAY SURESTEP FOLEY TEMP SENSING 16FR SNAP SECURE(10EA/CA) ORDER #18764 FOR TEMP FOLEY ONLY

## (undated) DEVICE — SUTURE 2-0 ETHIBOND V-5 - (6/BX)

## (undated) DEVICE — SYRINGE 50 ML LL (40EA/BX 4BX/CA)

## (undated) DEVICE — LACTATED RINGERS INJ 1000 ML - (14EA/CA 60CA/PF)

## (undated) DEVICE — GOWN SURGEONS LARGE - (32/CA)

## (undated) DEVICE — GLOVE BIOGEL SZ 8 SURGICAL PF LTX - (50PR/BX 4BX/CA)

## (undated) DEVICE — BAG, SPONGE COUNT 50600

## (undated) DEVICE — DEVICE KIT COR-KNOT COMBO2 DEVICES & 12 KNOTS PER KIT (6KT/CA)

## (undated) DEVICE — SUTURE 4-0 MONOCRYL PLUS PS-2 - 27 INCH (36/BX)

## (undated) DEVICE — BAG RESUSCITATION DISPOSABLE - WITH MASK (10 EA/CA)

## (undated) DEVICE — CHLORAPREP 26 ML APPLICATOR - ORANGE TINT(25/CA)

## (undated) DEVICE — GLOVE BIOGEL SZ 7 SURGICAL PF LTX - (50PR/BX 4BX/CA)

## (undated) DEVICE — BAG SPONGE COUNT 10.25 X 32 - BLUE (250/CA)

## (undated) DEVICE — GLOVE BIOGEL PI ORTHO SZ 7 PF LF (40PR/BX)

## (undated) DEVICE — SODIUM CHL. INJ. 0.9% 500ML (24EA/CA 50CA/PF)

## (undated) DEVICE — SENSOR OXIMETER EQUANOX ADVANCE LARGE DISPOSABLE 8004CA 7600 SYSTEM (MUST ORDER IN QTYS OF 20)

## (undated) DEVICE — ELECTRODE DUAL RETURN W/ CORD - (50/PK)

## (undated) DEVICE — GLOVE SZ 8 BIOGEL PI MICRO - PF LF (50PR/BX)

## (undated) DEVICE — CANISTER SUCTION RIGID RED 1500CC (40EA/CA)

## (undated) DEVICE — SUTURE OHS

## (undated) DEVICE — NEPTUNE 4 PORT MANIFOLD - (20/PK)

## (undated) DEVICE — SPACEMAKER PREP DIST BALLOON - (5/BX) KIT COMPONENT----USE ITEM 14468---

## (undated) DEVICE — ELECTRODE 850 FOAM ADHESIVE - HYDROGEL RADIOTRNSPRNT (50/PK)

## (undated) DEVICE — GLOVE BIOGEL PI INDICATOR SZ 7.5 SURGICAL PF LF -(50/BX 4BX/CA)

## (undated) DEVICE — SYS DLV COST CLS RM TEMP - INJECTATE (CO-SET II) (10EA/CA)

## (undated) DEVICE — TUBE CHEST 32FR SOFT STRAIGHT (10EA/BX)

## (undated) DEVICE — KIT INTROPERCUTANEOUS SHEATH - 8.5 FR W/MAX BARRIER AND BIOPATCH (5/CA)

## (undated) DEVICE — GLOVE 6.5 LF PF PROTEXIS (50PR/BX)

## (undated) DEVICE — COVER LIGHT HANDLE ALC PLUS DISP (18EA/BX)

## (undated) DEVICE — GLOVE SZ 7.5 BIOGEL PI MICRO - PF LF (50PR/BX)

## (undated) DEVICE — SUTURE 3-0 SILK SH C/R 18 IN - (12/BX)

## (undated) DEVICE — TUBE CHEST 32FR. RIGHT ANGLED (10EA/CA)

## (undated) DEVICE — DRAPE SLUSH WARMER DISC (24EA/CA)

## (undated) DEVICE — TUBING INSUFFLATION - (10/BX)

## (undated) DEVICE — TROCAR 5X100 NON BLADED Z-TH - READ KII (6/BX)

## (undated) DEVICE — FILTER BLOOD TRANSFUSION - (40/CA) (PALL)

## (undated) DEVICE — D-5-W INJ. 500 ML - (24EA/CA)

## (undated) DEVICE — SENSOR SPO2 NEO LNCS ADHESIVE (20/BX) SEE USER NOTES

## (undated) DEVICE — SPRING BULLDOG 1/2 FORCE BLUE - (10/BX)

## (undated) DEVICE — SUTURE 5 SURGICAL STEEL V-40 - (12/BX) CCS CURRENT

## (undated) DEVICE — SET EXTENSION WITH 2 PORTS (48EA/CA) ***PART #2C8610 IS A SUBSTITUTE*****

## (undated) DEVICE — SET TUBING PNEUMOCLEAR HIGH FLOW SMOKE EVACUATION (10EA/BX)

## (undated) DEVICE — SPACEMAKER BLUNT TIP TROCAR - 10MM (5/BX) KIT COMPONENT----USE ITEM 14468---

## (undated) DEVICE — COVER LIGHT HANDLE FLEXIBLE - SOFT (2EA/PK 80PK/CA)

## (undated) DEVICE — COVER CAMERA LENS LIGHT HANDLE STUBBY DISPOSABLE (20EA/BX)

## (undated) DEVICE — CAUTERY OPTHALMIC HOT TEMP (10EA/SP)

## (undated) DEVICE — WATER IRRIGATION STERILE 1000ML (12EA/CA)

## (undated) DEVICE — PTFE PLED STER - (250/CA)

## (undated) DEVICE — PROTECTOR ULNA NERVE - (36PR/CA)

## (undated) DEVICE — BANDAID X-LARGE 2 X 4 IN LF (50EA/BX)

## (undated) DEVICE — CANISTER SUCTION 3000ML MECHANICAL FILTER AUTO SHUTOFF MEDI-VAC NONSTERILE LF DISP (40EA/CA)

## (undated) DEVICE — SYSTEM CHEST DRAIN ADULT/PEDS W/AUTO TRANSFUSION CAPABILITY SAHARA (6EA/CA)

## (undated) DEVICE — GLOVE BIOGEL SZ 7.5 SURGICAL PF LTX - (50PR/BX 4BX/CA)

## (undated) DEVICE — SUCTION INSTRUMENT YANKAUER BULBOUS TIP W/O VENT (50EA/CA)

## (undated) DEVICE — SUTURE 4-0 PROLENE RB-1 D/A 36 (36PK/BX)"

## (undated) DEVICE — SUTURE 0 VICRYL PLUS UR-6 - 27 INCH (36/BX)

## (undated) DEVICE — TUBING CLEARLINK DUO-VENT - C-FLO (48EA/CA)

## (undated) DEVICE — HUMID-VENT HEAT AND MOISTURE EXCHANGE- (50/BX)

## (undated) DEVICE — POLY UMBILICAL TAPE 1/8X30 - (36/BX)

## (undated) DEVICE — GOWN WARMING STANDARD FLEX - (30/CA)

## (undated) DEVICE — MASK ANESTHESIA ADULT  - (100/CA)

## (undated) DEVICE — SUTURE 6-0 PROLENE RB-2 D/A 30 (36PK/BX)"

## (undated) DEVICE — BLADE SURGICAL #15 - (50/BX 3BX/CA)

## (undated) DEVICE — LEAD PACING TEMP MYO - (12/BX)

## (undated) DEVICE — PACK CV DRAPING/BASIN 2PART - (1/CA)

## (undated) DEVICE — BLADE STERNUM SAW SURGICAL 32.0 X 6.4 MM STERILE (1/EA)

## (undated) DEVICE — ELECTRODE RADIOLUCNT SOLID GEL DEFIB PADS (12EA/CA)

## (undated) DEVICE — SOD. CHL. INJ. 0.9% 1000 ML - (14EA/CA 60CA/PF)

## (undated) DEVICE — KIT ANESTHESIA W/CIRCUIT & 3/LT BAG W/FILTER (20EA/CA)

## (undated) DEVICE — GLOVE, LITE (PAIR)

## (undated) DEVICE — KIT RADIAL ARTERY 20GA W/MAX BARRIER AND BIOPATCH (5EA/CA) #10740 IS FOR THE SET RADIAL ARTERIAL

## (undated) DEVICE — FIBRILLAR SURGICEL 4X4 - 10/CA

## (undated) DEVICE — CATHETER THERMALDILUTION SWAN - (5EA/CA)

## (undated) DEVICE — SUTURE 4-0 PROLENE V-7 D/A (36PK/BX)

## (undated) DEVICE — SUTURE 5-0 PROLENE RB-1 D/A 36 (36PK/BX)"

## (undated) DEVICE — CANNULA W/SEAL 5X100 Z-THRE - ADED KII (12/BX)

## (undated) DEVICE — PAD LAP STERILE 18 X 18 - (5/PK 40PK/CA)

## (undated) DEVICE — ARMBOARD SMALL IV 9 INLONG - (25EA/CA)

## (undated) DEVICE — GLOVE BIOGEL INDICATOR SZ 7.5 SURGICAL PF LTX - (50PR/BX 4BX/CA)

## (undated) DEVICE — MICRODRIP PRIMARY VENTED 60 (48EA/CA) THIS WAS PART #2C8428 WHICH WAS DISCONTINUED

## (undated) DEVICE — HEAD HOLDER JUNIOR/ADULT

## (undated) DEVICE — ELECTRODES PAIRED NEEDLE - (1/BX)

## (undated) DEVICE — SENSOR OXIMETER ADULT SPO2 RD SET (20EA/BX)

## (undated) DEVICE — SUTURE GENERAL

## (undated) DEVICE — GLOVE BIOGEL INDICATOR SZ 8 SURGICAL PF LTX - (50/BX 4BX/CA)

## (undated) DEVICE — BANDAID SHEER STRIP 3/4 IN (100EA/BX 12BX/CA)

## (undated) DEVICE — SET LEADWIRE 5 LEAD BEDSIDE DISPOSABLE ECG (1SET OF 5/EA)